# Patient Record
Sex: MALE | Race: WHITE | NOT HISPANIC OR LATINO | Employment: FULL TIME | URBAN - METROPOLITAN AREA
[De-identification: names, ages, dates, MRNs, and addresses within clinical notes are randomized per-mention and may not be internally consistent; named-entity substitution may affect disease eponyms.]

---

## 2017-05-27 ENCOUNTER — APPOINTMENT (EMERGENCY)
Dept: CT IMAGING | Facility: HOSPITAL | Age: 42
End: 2017-05-27
Payer: COMMERCIAL

## 2017-05-27 ENCOUNTER — HOSPITAL ENCOUNTER (EMERGENCY)
Facility: HOSPITAL | Age: 42
Discharge: HOME | End: 2017-05-27
Attending: EMERGENCY MEDICINE
Payer: COMMERCIAL

## 2017-05-27 VITALS
SYSTOLIC BLOOD PRESSURE: 115 MMHG | DIASTOLIC BLOOD PRESSURE: 89 MMHG | WEIGHT: 230 LBS | RESPIRATION RATE: 18 BRPM | BODY MASS INDEX: 32.93 KG/M2 | HEIGHT: 70 IN | HEART RATE: 79 BPM | OXYGEN SATURATION: 96 %

## 2017-05-27 DIAGNOSIS — N23 RENAL COLIC: Primary | ICD-10-CM

## 2017-05-27 LAB
ANION GAP SERPL CALCULATED.3IONS-SCNC: 7 MMOL/L (ref 4–13)
BACTERIA UR QL AUTO: ABNORMAL /HPF
BASOPHILS # BLD AUTO: 0.06 THOUSANDS/ΜL (ref 0–0.1)
BASOPHILS NFR BLD AUTO: 1 % (ref 0–1)
BILIRUB UR QL STRIP: ABNORMAL
BUN SERPL-MCNC: 10 MG/DL (ref 5–25)
CALCIUM SERPL-MCNC: 9.5 MG/DL (ref 8.3–10.1)
CHLORIDE SERPL-SCNC: 104 MMOL/L (ref 100–108)
CLARITY UR: ABNORMAL
CO2 SERPL-SCNC: 28 MMOL/L (ref 21–32)
COLOR UR: YELLOW
CREAT SERPL-MCNC: 0.9 MG/DL (ref 0.6–1.3)
EOSINOPHIL # BLD AUTO: 0.12 THOUSAND/ΜL (ref 0–0.61)
EOSINOPHIL NFR BLD AUTO: 1 % (ref 0–6)
ERYTHROCYTE [DISTWIDTH] IN BLOOD BY AUTOMATED COUNT: 12.9 % (ref 11.6–15.1)
GFR SERPL CREATININE-BSD FRML MDRD: >60 ML/MIN/1.73SQ M
GLUCOSE SERPL-MCNC: 127 MG/DL (ref 65–140)
GLUCOSE UR STRIP-MCNC: NEGATIVE MG/DL
HCT VFR BLD AUTO: 45.8 % (ref 36.5–49.3)
HGB BLD-MCNC: 15.1 G/DL (ref 12–17)
HGB UR QL STRIP.AUTO: ABNORMAL
KETONES UR STRIP-MCNC: ABNORMAL MG/DL
LEUKOCYTE ESTERASE UR QL STRIP: NEGATIVE
LYMPHOCYTES # BLD AUTO: 2.59 THOUSANDS/ΜL (ref 0.6–4.47)
LYMPHOCYTES NFR BLD AUTO: 26 % (ref 14–44)
MCH RBC QN AUTO: 28.5 PG (ref 26.8–34.3)
MCHC RBC AUTO-ENTMCNC: 33 G/DL (ref 31.4–37.4)
MCV RBC AUTO: 86 FL (ref 82–98)
MONOCYTES # BLD AUTO: 1.1 THOUSAND/ΜL (ref 0.17–1.22)
MONOCYTES NFR BLD AUTO: 11 % (ref 4–12)
NEUTROPHILS # BLD AUTO: 6 THOUSANDS/ΜL (ref 1.85–7.62)
NEUTS SEG NFR BLD AUTO: 61 % (ref 43–75)
NITRITE UR QL STRIP: NEGATIVE
NON-SQ EPI CELLS URNS QL MICRO: ABNORMAL /HPF
NRBC BLD AUTO-RTO: 0 /100 WBCS
OTHER STN SPEC: ABNORMAL
PH UR STRIP.AUTO: 5 [PH] (ref 4.5–8)
PLATELET # BLD AUTO: 280 THOUSANDS/UL (ref 149–390)
PMV BLD AUTO: 10.8 FL (ref 8.9–12.7)
POTASSIUM SERPL-SCNC: 4 MMOL/L (ref 3.5–5.3)
PROT UR STRIP-MCNC: ABNORMAL MG/DL
RBC # BLD AUTO: 5.3 MILLION/UL (ref 3.88–5.62)
RBC #/AREA URNS AUTO: ABNORMAL /HPF
SODIUM SERPL-SCNC: 139 MMOL/L (ref 136–145)
SP GR UR STRIP.AUTO: >=1.03 (ref 1–1.03)
UROBILINOGEN UR QL STRIP.AUTO: 1 E.U./DL
WBC # BLD AUTO: 9.89 THOUSAND/UL (ref 4.31–10.16)
WBC #/AREA URNS AUTO: ABNORMAL /HPF

## 2017-05-27 PROCEDURE — 96376 TX/PRO/DX INJ SAME DRUG ADON: CPT

## 2017-05-27 PROCEDURE — 74176 CT ABD & PELVIS W/O CONTRAST: CPT

## 2017-05-27 PROCEDURE — 96374 THER/PROPH/DIAG INJ IV PUSH: CPT

## 2017-05-27 PROCEDURE — 80048 BASIC METABOLIC PNL TOTAL CA: CPT | Performed by: EMERGENCY MEDICINE

## 2017-05-27 PROCEDURE — 81001 URINALYSIS AUTO W/SCOPE: CPT | Performed by: EMERGENCY MEDICINE

## 2017-05-27 PROCEDURE — 99284 EMERGENCY DEPT VISIT MOD MDM: CPT

## 2017-05-27 PROCEDURE — 96375 TX/PRO/DX INJ NEW DRUG ADDON: CPT

## 2017-05-27 PROCEDURE — 36415 COLL VENOUS BLD VENIPUNCTURE: CPT | Performed by: EMERGENCY MEDICINE

## 2017-05-27 PROCEDURE — 85025 COMPLETE CBC W/AUTO DIFF WBC: CPT | Performed by: EMERGENCY MEDICINE

## 2017-05-27 PROCEDURE — 96361 HYDRATE IV INFUSION ADD-ON: CPT

## 2017-05-27 RX ORDER — DEXTROAMPHETAMINE SACCHARATE, AMPHETAMINE ASPARTATE, DEXTROAMPHETAMINE SULFATE AND AMPHETAMINE SULFATE 2.5; 2.5; 2.5; 2.5 MG/1; MG/1; MG/1; MG/1
1 TABLET ORAL
COMMUNITY
End: 2018-08-25

## 2017-05-27 RX ORDER — OXYCODONE HYDROCHLORIDE AND ACETAMINOPHEN 5; 325 MG/1; MG/1
1 TABLET ORAL EVERY 6 HOURS PRN
Qty: 20 TABLET | Refills: 0 | Status: SHIPPED | OUTPATIENT
Start: 2017-05-27 | End: 2017-06-06

## 2017-05-27 RX ORDER — MORPHINE SULFATE 4 MG/ML
4 INJECTION, SOLUTION INTRAMUSCULAR; INTRAVENOUS ONCE
Status: COMPLETED | OUTPATIENT
Start: 2017-05-27 | End: 2017-05-27

## 2017-05-27 RX ORDER — MORPHINE SULFATE 4 MG/ML
INJECTION, SOLUTION INTRAMUSCULAR; INTRAVENOUS
Status: COMPLETED
Start: 2017-05-27 | End: 2017-05-27

## 2017-05-27 RX ORDER — ONDANSETRON 2 MG/ML
INJECTION INTRAMUSCULAR; INTRAVENOUS
Status: COMPLETED
Start: 2017-05-27 | End: 2017-05-27

## 2017-05-27 RX ORDER — ONDANSETRON 4 MG/1
4 TABLET, FILM COATED ORAL EVERY 6 HOURS PRN
Qty: 10 TABLET | Refills: 0 | Status: SHIPPED | OUTPATIENT
Start: 2017-05-27 | End: 2018-08-25

## 2017-05-27 RX ORDER — TAMSULOSIN HYDROCHLORIDE 0.4 MG/1
0.4 CAPSULE ORAL
Qty: 14 CAPSULE | Refills: 0 | Status: SHIPPED | OUTPATIENT
Start: 2017-05-27 | End: 2018-08-25

## 2017-05-27 RX ORDER — ONDANSETRON 2 MG/ML
4 INJECTION INTRAMUSCULAR; INTRAVENOUS ONCE
Status: COMPLETED | OUTPATIENT
Start: 2017-05-27 | End: 2017-05-27

## 2017-05-27 RX ADMIN — MORPHINE SULFATE 4 MG: 4 INJECTION, SOLUTION INTRAMUSCULAR; INTRAVENOUS at 19:42

## 2017-05-27 RX ADMIN — ONDANSETRON 4 MG: 2 INJECTION INTRAMUSCULAR; INTRAVENOUS at 19:42

## 2017-05-27 RX ADMIN — MORPHINE SULFATE 4 MG: 4 INJECTION, SOLUTION INTRAMUSCULAR; INTRAVENOUS at 20:28

## 2017-05-27 RX ADMIN — SODIUM CHLORIDE 1000 ML: 0.9 INJECTION, SOLUTION INTRAVENOUS at 19:42

## 2017-05-30 ENCOUNTER — GENERIC CONVERSION - ENCOUNTER (OUTPATIENT)
Dept: OTHER | Facility: OTHER | Age: 42
End: 2017-05-30

## 2017-05-30 ENCOUNTER — ALLSCRIPTS OFFICE VISIT (OUTPATIENT)
Dept: OTHER | Facility: OTHER | Age: 42
End: 2017-05-30

## 2017-05-30 LAB
BILIRUB UR QL STRIP: NORMAL
CLARITY UR: NORMAL
COLOR UR: YELLOW
GLUCOSE (HISTORICAL): NORMAL
HGB UR QL STRIP.AUTO: NORMAL
KETONES UR STRIP-MCNC: NORMAL MG/DL
LEUKOCYTE ESTERASE UR QL STRIP: NORMAL
NITRITE UR QL STRIP: NORMAL
PH UR STRIP.AUTO: 7 [PH]
PROT UR STRIP-MCNC: NORMAL MG/DL
SP GR UR STRIP.AUTO: 1

## 2017-06-02 ENCOUNTER — APPOINTMENT (OUTPATIENT)
Dept: LAB | Facility: CLINIC | Age: 42
End: 2017-06-02
Payer: COMMERCIAL

## 2017-06-02 ENCOUNTER — OFFICE VISIT (OUTPATIENT)
Dept: LAB | Facility: CLINIC | Age: 42
End: 2017-06-02
Payer: COMMERCIAL

## 2017-06-02 ENCOUNTER — TRANSCRIBE ORDERS (OUTPATIENT)
Dept: LAB | Facility: CLINIC | Age: 42
End: 2017-06-02

## 2017-06-02 DIAGNOSIS — N20.0 CALCULUS OF KIDNEY: Primary | ICD-10-CM

## 2017-06-02 DIAGNOSIS — N20.0 CALCULUS OF KIDNEY: ICD-10-CM

## 2017-06-02 LAB
ANION GAP SERPL CALCULATED.3IONS-SCNC: 8 MMOL/L (ref 4–13)
APTT PPP: 27 SECONDS (ref 23–35)
ATRIAL RATE: 63 BPM
BASOPHILS # BLD AUTO: 0.08 THOUSANDS/ΜL (ref 0–0.1)
BASOPHILS NFR BLD AUTO: 1 % (ref 0–1)
BUN SERPL-MCNC: 16 MG/DL (ref 5–25)
CALCIUM SERPL-MCNC: 8.9 MG/DL (ref 8.3–10.1)
CHLORIDE SERPL-SCNC: 107 MMOL/L (ref 100–108)
CO2 SERPL-SCNC: 28 MMOL/L (ref 21–32)
CREAT SERPL-MCNC: 1.48 MG/DL (ref 0.6–1.3)
EOSINOPHIL # BLD AUTO: 0.22 THOUSAND/ΜL (ref 0–0.61)
EOSINOPHIL NFR BLD AUTO: 2 % (ref 0–6)
ERYTHROCYTE [DISTWIDTH] IN BLOOD BY AUTOMATED COUNT: 12.9 % (ref 11.6–15.1)
GFR SERPL CREATININE-BSD FRML MDRD: 52.1 ML/MIN/1.73SQ M
GLUCOSE SERPL-MCNC: 93 MG/DL (ref 65–140)
HCT VFR BLD AUTO: 43.2 % (ref 36.5–49.3)
HGB BLD-MCNC: 14.2 G/DL (ref 12–17)
INR PPP: 0.92 (ref 0.86–1.16)
LYMPHOCYTES # BLD AUTO: 2.62 THOUSANDS/ΜL (ref 0.6–4.47)
LYMPHOCYTES NFR BLD AUTO: 27 % (ref 14–44)
MCH RBC QN AUTO: 28.4 PG (ref 26.8–34.3)
MCHC RBC AUTO-ENTMCNC: 32.9 G/DL (ref 31.4–37.4)
MCV RBC AUTO: 86 FL (ref 82–98)
MONOCYTES # BLD AUTO: 1.09 THOUSAND/ΜL (ref 0.17–1.22)
MONOCYTES NFR BLD AUTO: 11 % (ref 4–12)
NEUTROPHILS # BLD AUTO: 5.67 THOUSANDS/ΜL (ref 1.85–7.62)
NEUTS SEG NFR BLD AUTO: 59 % (ref 43–75)
P AXIS: 6 DEGREES
PLATELET # BLD AUTO: 295 THOUSANDS/UL (ref 149–390)
PMV BLD AUTO: 10.4 FL (ref 8.9–12.7)
POTASSIUM SERPL-SCNC: 4.1 MMOL/L (ref 3.5–5.3)
PR INTERVAL: 156 MS
PROTHROMBIN TIME: 12.6 SECONDS (ref 12.1–14.4)
QRS AXIS: 60 DEGREES
QRSD INTERVAL: 88 MS
QT INTERVAL: 374 MS
QTC INTERVAL: 382 MS
RBC # BLD AUTO: 5 MILLION/UL (ref 3.88–5.62)
SODIUM SERPL-SCNC: 143 MMOL/L (ref 136–145)
T WAVE AXIS: 20 DEGREES
VENTRICULAR RATE: 63 BPM
WBC # BLD AUTO: 9.68 THOUSAND/UL (ref 4.31–10.16)

## 2017-06-02 PROCEDURE — 85610 PROTHROMBIN TIME: CPT

## 2017-06-02 PROCEDURE — 85730 THROMBOPLASTIN TIME PARTIAL: CPT

## 2017-06-02 PROCEDURE — 85025 COMPLETE CBC W/AUTO DIFF WBC: CPT

## 2017-06-02 PROCEDURE — 36415 COLL VENOUS BLD VENIPUNCTURE: CPT

## 2017-06-02 PROCEDURE — 80048 BASIC METABOLIC PNL TOTAL CA: CPT

## 2017-06-02 PROCEDURE — 93005 ELECTROCARDIOGRAM TRACING: CPT

## 2017-06-07 ENCOUNTER — ANESTHESIA EVENT (OUTPATIENT)
Dept: PERIOP | Facility: HOSPITAL | Age: 42
End: 2017-06-07
Payer: COMMERCIAL

## 2017-06-08 ENCOUNTER — ANESTHESIA (OUTPATIENT)
Dept: PERIOP | Facility: HOSPITAL | Age: 42
End: 2017-06-08
Payer: COMMERCIAL

## 2017-06-08 ENCOUNTER — APPOINTMENT (OUTPATIENT)
Dept: RADIOLOGY | Facility: HOSPITAL | Age: 42
End: 2017-06-08
Payer: COMMERCIAL

## 2017-06-08 ENCOUNTER — HOSPITAL ENCOUNTER (OUTPATIENT)
Facility: HOSPITAL | Age: 42
Setting detail: OUTPATIENT SURGERY
Discharge: HOME | End: 2017-06-08
Attending: UROLOGY | Admitting: UROLOGY
Payer: COMMERCIAL

## 2017-06-08 VITALS
DIASTOLIC BLOOD PRESSURE: 95 MMHG | WEIGHT: 230 LBS | HEART RATE: 60 BPM | TEMPERATURE: 97.5 F | HEIGHT: 70 IN | BODY MASS INDEX: 32.93 KG/M2 | RESPIRATION RATE: 18 BRPM | OXYGEN SATURATION: 95 % | SYSTOLIC BLOOD PRESSURE: 150 MMHG

## 2017-06-08 DIAGNOSIS — N20.0 NEPHROLITHIASIS: ICD-10-CM

## 2017-06-08 PROCEDURE — C1769 GUIDE WIRE: HCPCS | Performed by: UROLOGY

## 2017-06-08 PROCEDURE — C1758 CATHETER, URETERAL: HCPCS | Performed by: UROLOGY

## 2017-06-08 PROCEDURE — C2617 STENT, NON-COR, TEM W/O DEL: HCPCS | Performed by: UROLOGY

## 2017-06-08 PROCEDURE — 82360 CALCULUS ASSAY QUANT: CPT | Performed by: UROLOGY

## 2017-06-08 PROCEDURE — 74000 HB X-RAY EXAM OF ABDOMEN (SINGLE ANTEROPOSTERIOR VIEW): CPT

## 2017-06-08 PROCEDURE — A9270 NON-COVERED ITEM OR SERVICE: HCPCS | Performed by: UROLOGY

## 2017-06-08 DEVICE — STENT URETERAL 6 FR 26CM INLAY OPTIMA: Type: IMPLANTABLE DEVICE | Site: URETER | Status: FUNCTIONAL

## 2017-06-08 RX ORDER — ONDANSETRON 2 MG/ML
INJECTION INTRAMUSCULAR; INTRAVENOUS AS NEEDED
Status: DISCONTINUED | OUTPATIENT
Start: 2017-06-08 | End: 2017-06-08 | Stop reason: SURG

## 2017-06-08 RX ORDER — MORPHINE SULFATE 4 MG/ML
4 INJECTION, SOLUTION INTRAMUSCULAR; INTRAVENOUS EVERY 4 HOURS PRN
Status: DISCONTINUED | OUTPATIENT
Start: 2017-06-08 | End: 2017-06-08 | Stop reason: HOSPADM

## 2017-06-08 RX ORDER — CEFAZOLIN SODIUM 1 G/3ML
INJECTION, POWDER, FOR SOLUTION INTRAMUSCULAR; INTRAVENOUS AS NEEDED
Status: DISCONTINUED | OUTPATIENT
Start: 2017-06-08 | End: 2017-06-08 | Stop reason: SURG

## 2017-06-08 RX ORDER — PROPOFOL 10 MG/ML
INJECTION, EMULSION INTRAVENOUS AS NEEDED
Status: DISCONTINUED | OUTPATIENT
Start: 2017-06-08 | End: 2017-06-08 | Stop reason: SURG

## 2017-06-08 RX ORDER — HYDROCODONE BITARTRATE AND ACETAMINOPHEN 5; 325 MG/1; MG/1
1 TABLET ORAL EVERY 6 HOURS PRN
Qty: 5 TABLET | Refills: 0 | Status: SHIPPED | OUTPATIENT
Start: 2017-06-08 | End: 2017-06-10

## 2017-06-08 RX ORDER — ONDANSETRON 2 MG/ML
4 INJECTION INTRAMUSCULAR; INTRAVENOUS ONCE
Status: DISCONTINUED | OUTPATIENT
Start: 2017-06-08 | End: 2017-06-08 | Stop reason: HOSPADM

## 2017-06-08 RX ORDER — HYDROCODONE BITARTRATE AND ACETAMINOPHEN 5; 325 MG/1; MG/1
1 TABLET ORAL EVERY 4 HOURS PRN
Status: DISCONTINUED | OUTPATIENT
Start: 2017-06-08 | End: 2017-06-08 | Stop reason: HOSPADM

## 2017-06-08 RX ORDER — SODIUM CHLORIDE, SODIUM LACTATE, POTASSIUM CHLORIDE, CALCIUM CHLORIDE 600; 310; 30; 20 MG/100ML; MG/100ML; MG/100ML; MG/100ML
100 INJECTION, SOLUTION INTRAVENOUS CONTINUOUS
Status: DISCONTINUED | OUTPATIENT
Start: 2017-06-08 | End: 2017-06-08 | Stop reason: HOSPADM

## 2017-06-08 RX ORDER — CIPROFLOXACIN 500 MG/1
500 TABLET, FILM COATED ORAL 2 TIMES DAILY
Qty: 6 TABLET | Refills: 0 | Status: SHIPPED | OUTPATIENT
Start: 2017-06-08 | End: 2017-06-11

## 2017-06-08 RX ORDER — SODIUM CHLORIDE, SODIUM LACTATE, POTASSIUM CHLORIDE, CALCIUM CHLORIDE 600; 310; 30; 20 MG/100ML; MG/100ML; MG/100ML; MG/100ML
INJECTION, SOLUTION INTRAVENOUS CONTINUOUS PRN
Status: DISCONTINUED | OUTPATIENT
Start: 2017-06-08 | End: 2017-06-08 | Stop reason: SURG

## 2017-06-08 RX ORDER — ONDANSETRON 2 MG/ML
4 INJECTION INTRAMUSCULAR; INTRAVENOUS EVERY 4 HOURS PRN
Status: DISCONTINUED | OUTPATIENT
Start: 2017-06-08 | End: 2017-06-08 | Stop reason: HOSPADM

## 2017-06-08 RX ORDER — ACETAMINOPHEN 325 MG/1
650 TABLET ORAL EVERY 4 HOURS PRN
Status: DISCONTINUED | OUTPATIENT
Start: 2017-06-08 | End: 2017-06-08 | Stop reason: HOSPADM

## 2017-06-08 RX ORDER — FENTANYL CITRATE/PF 50 MCG/ML
25 SYRINGE (ML) INJECTION
Status: DISCONTINUED | OUTPATIENT
Start: 2017-06-08 | End: 2017-06-08 | Stop reason: HOSPADM

## 2017-06-08 RX ORDER — MIDAZOLAM HYDROCHLORIDE 1 MG/ML
INJECTION INTRAMUSCULAR; INTRAVENOUS AS NEEDED
Status: DISCONTINUED | OUTPATIENT
Start: 2017-06-08 | End: 2017-06-08 | Stop reason: SURG

## 2017-06-08 RX ORDER — FENTANYL CITRATE 50 UG/ML
INJECTION, SOLUTION INTRAMUSCULAR; INTRAVENOUS AS NEEDED
Status: DISCONTINUED | OUTPATIENT
Start: 2017-06-08 | End: 2017-06-08 | Stop reason: SURG

## 2017-06-08 RX ADMIN — PROPOFOL 200 MG: 10 INJECTION, EMULSION INTRAVENOUS at 13:17

## 2017-06-08 RX ADMIN — MIDAZOLAM HYDROCHLORIDE 2 MG: 1 INJECTION, SOLUTION INTRAMUSCULAR; INTRAVENOUS at 13:17

## 2017-06-08 RX ADMIN — SODIUM CHLORIDE, SODIUM LACTATE, POTASSIUM CHLORIDE, AND CALCIUM CHLORIDE: .6; .31; .03; .02 INJECTION, SOLUTION INTRAVENOUS at 13:15

## 2017-06-08 RX ADMIN — CEFAZOLIN SODIUM 2000 MG: 1 INJECTION, POWDER, FOR SOLUTION INTRAMUSCULAR; INTRAVENOUS at 13:11

## 2017-06-08 RX ADMIN — FENTANYL CITRATE 25 MCG: 50 INJECTION, SOLUTION INTRAMUSCULAR; INTRAVENOUS at 14:25

## 2017-06-08 RX ADMIN — DEXAMETHASONE SODIUM PHOSPHATE 10 MG: 10 INJECTION INTRAMUSCULAR; INTRAVENOUS at 13:17

## 2017-06-08 RX ADMIN — FENTANYL CITRATE 100 MCG: 50 INJECTION INTRAMUSCULAR; INTRAVENOUS at 13:17

## 2017-06-08 RX ADMIN — ONDANSETRON 4 MG: 2 INJECTION INTRAMUSCULAR; INTRAVENOUS at 13:17

## 2017-06-08 RX ADMIN — HYDROCODONE BITARTRATE AND ACETAMINOPHEN 1 TABLET: 5; 325 TABLET ORAL at 15:03

## 2017-06-08 RX ADMIN — FENTANYL CITRATE 25 MCG: 50 INJECTION, SOLUTION INTRAMUSCULAR; INTRAVENOUS at 14:29

## 2017-06-12 ENCOUNTER — GENERIC CONVERSION - ENCOUNTER (OUTPATIENT)
Dept: OTHER | Facility: OTHER | Age: 42
End: 2017-06-12

## 2017-06-19 LAB
CA PHOS MFR STONE: 5 %
CALCIUM OXALATE DIHYDRATE MFR STONE IR: 20 %
COLOR STONE: NORMAL
COM MFR STONE: 75 %
COMMENT-STONE3: NORMAL
COMPOSITION: NORMAL
LABORATORY COMMENT REPORT: NORMAL
NIDUS STONE QL: NORMAL
PHOTO: NORMAL
SIZE STONE: NORMAL MM
STONE ANALYSIS-IMP: NORMAL
STONE ANALYSIS-IMP: NORMAL
WT STONE: 22 MG

## 2017-06-20 DIAGNOSIS — N20.0 CALCULUS OF KIDNEY: ICD-10-CM

## 2017-07-19 ENCOUNTER — ALLSCRIPTS OFFICE VISIT (OUTPATIENT)
Dept: OTHER | Facility: OTHER | Age: 42
End: 2017-07-19

## 2018-01-12 NOTE — MISCELLANEOUS
Message  Return to work or school:   Lolita Plascencia is under my professional care  He was seen in my office on 10/4/16   He is able to return to work on  10/5/16    He can perform work without limitations  Kiel Hooper PA-C        Signatures   Electronically signed by : Kiel Hooper, [facility]; Oct  4 2016 12:01PM EST                       (Author)    Electronically signed by : Kiel Hooper, [facility]; Oct  4 2016 12:32PM EST                       (Author)    Electronically signed by : AGA Vanegas ; Oct  4 2016  4:31PM EST                       (Author)

## 2018-01-12 NOTE — MISCELLANEOUS
Message  Return to work or school:    He is able to return to work on  06/15/2017       AGA Landis        Signatures   Electronically signed by : AGA Bell ; Jun 12 2017  1:44PM EST

## 2018-01-14 VITALS
HEART RATE: 80 BPM | HEIGHT: 70 IN | SYSTOLIC BLOOD PRESSURE: 142 MMHG | BODY MASS INDEX: 33.21 KG/M2 | WEIGHT: 232 LBS | DIASTOLIC BLOOD PRESSURE: 90 MMHG

## 2018-01-15 VITALS
BODY MASS INDEX: 32.99 KG/M2 | HEART RATE: 80 BPM | WEIGHT: 230.44 LBS | SYSTOLIC BLOOD PRESSURE: 160 MMHG | DIASTOLIC BLOOD PRESSURE: 100 MMHG | HEIGHT: 70 IN
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
## 2018-01-23 NOTE — PROGRESS NOTES
Assessment   1  Closed fracture of distal phalanx of left thumb (816 02) (V02 107P)    Plan  Closed fracture of distal phalanx of left thumb    · Follow-up PRN Evaluation and Treatment  Follow-up  Status: Complete  Done:  [id number]   · * XR THUMB LEFT FIRST DIGIT-MIN 2V; Status:Active;1  Requested for:[id number];      1 Amended By: Katja Avalos; Oct 04 2016 4:31 PM EST    Discussion/Summary    The patient was seen and examined  I discussed with the patient the results of his x-rays, and anticipate his nerve to continue to recover from the crush injury  We'll send him back to work full duty without restrictions  We'll see him back on an as-needed basis, the patient's questions were answered  Case discussed with Dr Angelique Dyer  Chief Complaint   1  Finger Problem  Follow-up left thumb distal phalanx fracture      History of Present Illness  HPI: Rochelle Nicholas is a 50-year-old male presenting to the office today for follow-up of his left thumb distal phalanx fracture  He reports doing well  He is doing well with light duty at work  He states that he does feel some numbness and tingling to the distal tip of the thumb which is getting better with desensitization techniques  He did attend physical therapy  His thumb nail is growing back  Review of Systems  ROS: All pertinent positives and negatives are reviewed with the patient, please see HPI for pertinent positives  All other systems reviewed and are negative  No shortness of breath, no chest pain  ROS reviewed  Active Problems   1  Closed fracture of distal phalanx of left thumb (383 12) (X60 876R)    Past Medical History    The active problems and past medical history were reviewed and updated today  Surgical History    · History of Knee Surgery    The surgical history was reviewed and updated today  Family History  Family History    · No pertinent family history    The family history was reviewed and updated today         Social History    · Alcohol use (V49 89) (Z78 9)   · Caffeine use (V49 89) (F15 90)   · Never a smoker  The social history was reviewed and updated today  The social history was reviewed and is unchanged  Current Meds  1  Adderall 10 MG Oral Tablet Recorded    The medication list was reviewed and updated today  Allergies   1  No Known Drug Allergies    Vitals   Recorded: [id number] 56:55NE   Systolic 457   Diastolic 96   Heart Rate 73   Height 5 ft 10 in   Weight 227 lb    BMI Calculated 32 57   BSA Calculated 2 21     Physical Exam  Physical exam left thumb: Skin is intact, no obvious deformity  The patient reports decreased sensation to the tip of the thumb  The nail is growing underneath the dead nail  5 out of 5 APB strength  Full composite fist formation, sensation and motor intact median, radial, ulnar nerve distributions  Constitutional - General appearance: Normal    Musculoskeletal - Gait and station: Normal    Psychiatric - Orientation to person, place, and time: Normal  Mood and affect: Normal    Eyes   Conjunctiva and lids: Normal        Results/Data  I personally reviewed the films/images/results in the office today  My interpretation follows  X-ray Review 3 views of the left thumb reveal distal phalanx fracture in good position and evidence of healing  Attending Note  Collaborating Physician Note: Collaborating Physician:1  I interviewed and examined the patient1  and I agree with the Advanced Practitioner note1         1 Amended By: Elena Acosta; Oct 04 2016 4:31 PM EST    Message  Return to work or school:   Waldemar Fonseca is under my professional care  He was seen in my office on 10/4/16   He is able to return to work on  10/5/16    He can perform work without limitations  Pooja Barth PA-C        Signatures   Electronically signed by : Pooja Barth, [facility]; Oct  4 2016 12:01PM EST                       (Author)    Electronically signed by : AGA Pelaez ; Oct  4 2016  4:31PM EST (Author)

## 2018-03-10 ENCOUNTER — HOSPITAL ENCOUNTER (EMERGENCY)
Facility: HOSPITAL | Age: 43
Discharge: HOME | End: 2018-03-10
Attending: EMERGENCY MEDICINE
Payer: COMMERCIAL

## 2018-03-10 VITALS
TEMPERATURE: 98.7 F | HEART RATE: 77 BPM | SYSTOLIC BLOOD PRESSURE: 160 MMHG | RESPIRATION RATE: 16 BRPM | DIASTOLIC BLOOD PRESSURE: 83 MMHG | OXYGEN SATURATION: 96 % | WEIGHT: 235 LBS | BODY MASS INDEX: 33.72 KG/M2

## 2018-03-10 DIAGNOSIS — S39.012A ACUTE MYOFASCIAL STRAIN OF LUMBAR REGION, INITIAL ENCOUNTER: Primary | ICD-10-CM
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
## 2018-03-10 PROCEDURE — 99283 EMERGENCY DEPT VISIT LOW MDM: CPT

## 2018-03-10 RX ORDER — METHOCARBAMOL 500 MG/1
500 TABLET, FILM COATED ORAL 2 TIMES DAILY
Qty: 20 TABLET | Refills: 0 | Status: SHIPPED | OUTPATIENT
Start: 2018-03-10 | End: 2018-08-25
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
## 2018-03-30 ENCOUNTER — TELEPHONE (OUTPATIENT)
Dept: UROLOGY | Facility: AMBULATORY SURGERY CENTER | Age: 43
End: 2018-03-30

## 2018-03-30 ENCOUNTER — APPOINTMENT (EMERGENCY)
Dept: CT IMAGING | Facility: HOSPITAL | Age: 43
End: 2018-03-30
Payer: COMMERCIAL

## 2018-03-30 ENCOUNTER — HOSPITAL ENCOUNTER (EMERGENCY)
Facility: HOSPITAL | Age: 43
Discharge: HOME | End: 2018-03-30
Payer: COMMERCIAL

## 2018-03-30 VITALS
BODY MASS INDEX: 35.08 KG/M2 | WEIGHT: 244.49 LBS | TEMPERATURE: 98.2 F | HEART RATE: 80 BPM | OXYGEN SATURATION: 94 % | RESPIRATION RATE: 16 BRPM | DIASTOLIC BLOOD PRESSURE: 86 MMHG | SYSTOLIC BLOOD PRESSURE: 137 MMHG

## 2018-03-30 DIAGNOSIS — N20.0 KIDNEY STONE: Primary | ICD-10-CM

## 2018-03-30 DIAGNOSIS — R11.2 NAUSEA & VOMITING: ICD-10-CM

## 2018-03-30 DIAGNOSIS — K76.0 HEPATIC STEATOSIS: ICD-10-CM

## 2018-03-30 LAB
ALBUMIN SERPL BCP-MCNC: 3.6 G/DL (ref 3.5–5)
ALP SERPL-CCNC: 57 U/L (ref 46–116)
ALT SERPL W P-5'-P-CCNC: 121 U/L (ref 12–78)
ANION GAP SERPL CALCULATED.3IONS-SCNC: 9 MMOL/L (ref 4–13)
AST SERPL W P-5'-P-CCNC: 48 U/L (ref 5–45)
BASOPHILS # BLD AUTO: 0.08 THOUSANDS/ΜL (ref 0–0.1)
BASOPHILS NFR BLD AUTO: 1 % (ref 0–1)
BILIRUB SERPL-MCNC: 0.8 MG/DL (ref 0.2–1)
BUN SERPL-MCNC: 15 MG/DL (ref 5–25)
CALCIUM SERPL-MCNC: 8.8 MG/DL (ref 8.3–10.1)
CHLORIDE SERPL-SCNC: 103 MMOL/L (ref 100–108)
CLARITY, POC: CLEAR
CO2 SERPL-SCNC: 27 MMOL/L (ref 21–32)
COLOR, POC: ABNORMAL
CREAT SERPL-MCNC: 0.87 MG/DL (ref 0.6–1.3)
EOSINOPHIL # BLD AUTO: 0.18 THOUSAND/ΜL (ref 0–0.61)
EOSINOPHIL NFR BLD AUTO: 2 % (ref 0–6)
ERYTHROCYTE [DISTWIDTH] IN BLOOD BY AUTOMATED COUNT: 13 % (ref 11.6–15.1)
EXT BILIRUBIN, UA: NEGATIVE
EXT BLOOD URINE: ABNORMAL
EXT GLUCOSE, UA: NEGATIVE
EXT KETONES: NEGATIVE
EXT NITRITE, UA: NEGATIVE
EXT PH, UA: 6
EXT PROTEIN, UA: ABNORMAL
EXT SPECIFIC GRAVITY, UA: 1.03
EXT UROBILINOGEN: NEGATIVE
GFR SERPL CREATININE-BSD FRML MDRD: 106 ML/MIN/1.73SQ M
GLUCOSE SERPL-MCNC: 149 MG/DL (ref 65–140)
HCT VFR BLD AUTO: 47.3 % (ref 36.5–49.3)
HGB BLD-MCNC: 15.7 G/DL (ref 12–17)
LIPASE SERPL-CCNC: 102 U/L (ref 73–393)
LYMPHOCYTES # BLD AUTO: 2.86 THOUSANDS/ΜL (ref 0.6–4.47)
LYMPHOCYTES NFR BLD AUTO: 30 % (ref 14–44)
MCH RBC QN AUTO: 28.5 PG (ref 26.8–34.3)
MCHC RBC AUTO-ENTMCNC: 33.2 G/DL (ref 31.4–37.4)
MCV RBC AUTO: 86 FL (ref 82–98)
MONOCYTES # BLD AUTO: 1.13 THOUSAND/ΜL (ref 0.17–1.22)
MONOCYTES NFR BLD AUTO: 12 % (ref 4–12)
NEUTROPHILS # BLD AUTO: 5.42 THOUSANDS/ΜL (ref 1.85–7.62)
NEUTS SEG NFR BLD AUTO: 56 % (ref 43–75)
NRBC BLD AUTO-RTO: 0 /100 WBCS
PLATELET # BLD AUTO: 251 THOUSANDS/UL (ref 149–390)
PMV BLD AUTO: 10.3 FL (ref 8.9–12.7)
POTASSIUM SERPL-SCNC: 4.1 MMOL/L (ref 3.5–5.3)
PROT SERPL-MCNC: 7.1 G/DL (ref 6.4–8.2)
RBC # BLD AUTO: 5.51 MILLION/UL (ref 3.88–5.62)
SODIUM SERPL-SCNC: 139 MMOL/L (ref 136–145)
WBC # BLD AUTO: 9.7 THOUSAND/UL (ref 4.31–10.16)
WBC # BLD EST: NEGATIVE 10*3/UL

## 2018-03-30 PROCEDURE — 74176 CT ABD & PELVIS W/O CONTRAST: CPT

## 2018-03-30 PROCEDURE — 96361 HYDRATE IV INFUSION ADD-ON: CPT

## 2018-03-30 PROCEDURE — 85025 COMPLETE CBC W/AUTO DIFF WBC: CPT | Performed by: PHYSICIAN ASSISTANT

## 2018-03-30 PROCEDURE — 96375 TX/PRO/DX INJ NEW DRUG ADDON: CPT

## 2018-03-30 PROCEDURE — 96374 THER/PROPH/DIAG INJ IV PUSH: CPT

## 2018-03-30 PROCEDURE — 83690 ASSAY OF LIPASE: CPT | Performed by: PHYSICIAN ASSISTANT

## 2018-03-30 PROCEDURE — 99284 EMERGENCY DEPT VISIT MOD MDM: CPT

## 2018-03-30 PROCEDURE — 36415 COLL VENOUS BLD VENIPUNCTURE: CPT | Performed by: PHYSICIAN ASSISTANT

## 2018-03-30 PROCEDURE — 80053 COMPREHEN METABOLIC PANEL: CPT | Performed by: PHYSICIAN ASSISTANT

## 2018-03-30 PROCEDURE — 81002 URINALYSIS NONAUTO W/O SCOPE: CPT | Performed by: PHYSICIAN ASSISTANT

## 2018-03-30 RX ORDER — ONDANSETRON 4 MG/1
TABLET, FILM COATED ORAL
Qty: 12 TABLET | Refills: 0 | Status: SHIPPED | OUTPATIENT
Start: 2018-03-30 | End: 2018-08-25

## 2018-03-30 RX ORDER — OXYCODONE HYDROCHLORIDE AND ACETAMINOPHEN 5; 325 MG/1; MG/1
TABLET ORAL
Qty: 20 TABLET | Refills: 0 | Status: SHIPPED | OUTPATIENT
Start: 2018-03-30 | End: 2018-08-25

## 2018-03-30 RX ORDER — IBUPROFEN 600 MG/1
TABLET ORAL
Qty: 30 TABLET | Refills: 0 | Status: SHIPPED | OUTPATIENT
Start: 2018-03-30 | End: 2018-08-25

## 2018-03-30 RX ORDER — FENTANYL CITRATE 50 UG/ML
50 INJECTION, SOLUTION INTRAMUSCULAR; INTRAVENOUS ONCE
Status: COMPLETED | OUTPATIENT
Start: 2018-03-30 | End: 2018-03-30

## 2018-03-30 RX ORDER — MORPHINE SULFATE 4 MG/ML
4 INJECTION, SOLUTION INTRAMUSCULAR; INTRAVENOUS ONCE
Status: COMPLETED | OUTPATIENT
Start: 2018-03-30 | End: 2018-03-30

## 2018-03-30 RX ORDER — KETOROLAC TROMETHAMINE 30 MG/ML
30 INJECTION, SOLUTION INTRAMUSCULAR; INTRAVENOUS ONCE
Status: COMPLETED | OUTPATIENT
Start: 2018-03-30 | End: 2018-03-30

## 2018-03-30 RX ORDER — ONDANSETRON 2 MG/ML
4 INJECTION INTRAMUSCULAR; INTRAVENOUS ONCE
Status: COMPLETED | OUTPATIENT
Start: 2018-03-30 | End: 2018-03-30

## 2018-03-30 RX ADMIN — SODIUM CHLORIDE 1000 ML: 0.9 INJECTION, SOLUTION INTRAVENOUS at 08:06

## 2018-03-30 RX ADMIN — MORPHINE SULFATE 4 MG: 4 INJECTION, SOLUTION INTRAMUSCULAR; INTRAVENOUS at 08:20

## 2018-03-30 RX ADMIN — ONDANSETRON 4 MG: 2 INJECTION INTRAMUSCULAR; INTRAVENOUS at 08:06

## 2018-03-30 RX ADMIN — FENTANYL CITRATE 50 MCG: 50 INJECTION, SOLUTION INTRAMUSCULAR; INTRAVENOUS at 09:19

## 2018-03-30 RX ADMIN — KETOROLAC TROMETHAMINE 30 MG: 30 INJECTION, SOLUTION INTRAMUSCULAR at 08:20

## 2018-03-30 NOTE — DISCHARGE INSTRUCTIONS
Kidney Stones   WHAT YOU NEED TO KNOW:   Kidney stones form in the urinary system when the water and waste in your urine are out of balance  When this happens, certain types of waste crystals separate from the urine  The crystals build up and form kidney stones  You may have 1 or more kidney stones  DISCHARGE INSTRUCTIONS:   Return to the emergency department if:   · You have vomiting that is not relieved by medicine  Contact your healthcare provider if:   · You have a fever  · You have trouble passing urine  · You see blood in your urine  · You have severe pain  · You have any questions or concerns about your condition or care  Medicines:   · NSAIDs , such as ibuprofen, help decrease swelling, pain, and fever  This medicine is available with or without a doctor's order  NSAIDs can cause stomach bleeding or kidney problems in certain people  If you take blood thinner medicine, always ask your healthcare provider if NSAIDs are safe for you  Always read the medicine label and follow directions  · Prescription medicine  may be given  Ask how to take this medicine safely  · Medicines  to balance your electrolytes may be needed  · Take your medicine as directed  Contact your healthcare provider if you think your medicine is not helping or if you have side effects  Tell him or her if you are allergic to any medicine  Keep a list of the medicines, vitamins, and herbs you take  Include the amounts, and when and why you take them  Bring the list or the pill bottles to follow-up visits  Carry your medicine list with you in case of an emergency  Follow up with your healthcare provider as directed: You may need to return for more tests  Write down your questions so you remember to ask them during your visits  Self-care:   · Drink plenty of liquids  Your healthcare provider may tell you to drink at least 8 to 12 (eight-ounce) cups of liquids each day   This helps flush out the kidney stones when you urinate  Water is the best liquid to drink  · Strain your urine every time you go to the bathroom  Urinate through a strainer or a piece of thin cloth to catch the stones  Take the stones to your healthcare provider so they can be sent to the lab for tests  This will help your healthcare providers plan the best treatment for you  · Eat a variety of healthy foods  Healthy foods include fruits, vegetables, whole-grain breads, low-fat dairy products, beans, and fish  You may need to limit how much sodium (salt) or protein you eat  Ask for information about the best foods for you  · Stay active  Your stones may pass more easily by if you stay active  Ask about the best activities for you  After you pass your kidney stones:  Once you have passed your kidney stones, your healthcare provider may  order a 24-hour urine test  Results from a 24-hour urine test will help your healthcare provider plan ways to prevent more stones from forming  If you are told to do a 24-hour test, your healthcare provider will give you more instructions  © 2017 [facility] [facility] Information is for End User's use only and may not be sold, redistributed or otherwise used for commercial purposes  All illustrations and images included in CareNotes® are the copyrighted property of A D A M , Inc  or Gurvinder Alaniz  The above information is an  only  It is not intended as medical advice for individual conditions or treatments  Talk to your doctor, nurse or pharmacist before following any medical regimen to see if it is safe and effective for you  How to Strain Your Urine   WHAT YOU NEED TO KNOW:   Urinate into a strainer (funnel with a fine mesh on the bottom) or glass jar to collect kidney stones  DISCHARGE INSTRUCTIONS:   Medicines:   · Pain medicine: You may be given medicine to take away or decrease pain   Do not wait until the pain is severe before you take your medicine  · NSAIDs:  These medicines decrease swelling, pain, and fever  NSAIDs are available without a doctor's order  Ask which medicine is right for you  Ask how much to take and when to take it  Take as directed  NSAIDs can cause stomach bleeding and kidney problems if not taken correctly  · Nausea medicine: This medicine calms your stomach and prevents or controls vomiting  · Take your medicine as directed  Contact your healthcare provider if you think your medicine is not helping or if you have side effects  Tell him of her if you are allergic to any medicine  Keep a list of the medicines, vitamins, and herbs you take  Include the amounts, and when and why you take them  Bring the list or the pill bottles to follow-up visits  Carry your medicine list with you in case of an emergency  Drink liquids as directed:  Drink about 3 liters of liquids each day, or as directed  That equals about 12 glasses of water or fruit juice  Half of your total daily liquids should be water  Limit coffee, tea, and soda to 2 cups daily  Your urine will be pale and clear if you are drinking enough liquid  Self-care:   · Activity:  Exercise, such as walking, may help decrease your pain  · Avoid heat:  Heat may cause you to sweat, urinate less, and become dehydrated  Follow up with your healthcare provider or urologist as directed:  Write down your questions so you remember to ask them during your visits  Contact your healthcare provider or urologist if:   · You have a fever and chills  · Your urine looks cloudy or has a bad smell  · You have burning pain when you urinate  · You have trouble urinating  · You are vomiting and it does not get better, even after you take medicine  · You have questions or concerns about your condition or care  Return to the emergency department if:   · You are not able to urinate  · You have severe pain in your lower abdomen or side      · Your heart flutters or beats faster than usual   © 2017 [address] [employer]  Information is for End User's use only and may not be sold, redistributed or otherwise used for commercial purposes  All illustrations and images included in CareNotes® are the copyrighted property of A D A M , Inc  or Gurvinder Alaniz  The above information is an  only  It is not intended as medical advice for individual conditions or treatments  Talk to your doctor, nurse or pharmacist before following any medical regimen to see if it is safe and effective for you  Acute Nausea and Vomiting, Ambulatory Care   GENERAL INFORMATION:   Acute nausea and vomiting  starts suddenly, gets worse quickly, and lasts a short time  Nausea and vomiting may be caused by pregnancy, alcohol, infection, or medicines  Common related symptoms include the following:   · Fever    · Abdominal swelling    · Pain, tenderness, or a lump in the abdomen    · Splashing sounds heard in your stomach when you move  Seek immediate care for the following symptoms:   · Blood in your vomit or bowel movements    · Sudden, severe pain in your chest and upper abdomen after hard vomiting    · Dizziness, dry mouth, and thirst    · Urinating very little or not at all    · Muscle weakness, leg cramps, and trouble breathing    · A heart beat that is faster than normal    · Vomiting for more than 48 hours  Treatment for acute nausea and vomiting  may include medicines to calm your stomach and stop the vomiting  You may need IV fluids if you are dehydrated  Manage your nausea and vomiting:   · Drink liquids as directed to prevent dehydration  Ask how much liquid to drink each day and which liquids are best for you  You may need to drink an oral rehydration solution (ORS)  ORS contains water, salts, and sugar that are needed to replace the lost body fluids  Ask what kind of ORS to use, how much to drink, and where to get it  · Eat smaller meals, more often    Eat small amounts of food every 2 to 3 hours, even if you are not hungry  Food in your stomach may help decrease your nausea  · Avoid stress  Find ways to relax and manage your stress  Headaches due to stress may cause nausea and vomiting  Get more rest and sleep  Follow up with your healthcare provider as directed:  Write down your questions so you remember to ask them during your visits  CARE AGREEMENT:   You have the right to help plan your care  Learn about your health condition and how it may be treated  Discuss treatment options with your caregivers to decide what care you want to receive  You always have the right to refuse treatment  The above information is an  only  It is not intended as medical advice for individual conditions or treatments  Talk to your doctor, nurse or pharmacist before following any medical regimen to see if it is safe and effective for you  © 2014 [address] is for End User's use only and may not be sold, redistributed or otherwise used for commercial purposes  All illustrations and images included in CareNotes® are the copyrighted property of A D A M [employer] Inc  or [employer]  Non-Alcoholic Fatty Liver Disease   WHAT YOU NEED TO KNOW:   Non-alcoholic fatty liver disease (NAFLD) is a buildup of fat in your liver from a condition other than alcoholism  DISCHARGE INSTRUCTIONS:   Medicines:   · Medicines  may be given to manage blood sugar or cholesterol levels  · Take your medicine as directed  Contact your healthcare provider if you think your medicine is not helping or if you have side effects  Tell him or her if you are allergic to any medicine  Keep a list of the medicines, vitamins, and herbs you take  Include the amounts, and when and why you take them  Bring the list or the pill bottles to follow-up visits  Carry your medicine list with you in case of an emergency    Follow up with your healthcare provider as directed: You may need to return for more tests  You may also be referred to a specialist  Write down your questions so you remember to ask them during your visits  Manage NAFLD:   · Maintain a healthy weight  Ask your healthcare provider how much you should weigh  Ask him to help you create a weight loss plan if you are overweight  · Exercise  Aerobic exercise 3 times a week for 20 to 45 minutes can help decrease fat buildup in your liver  Examples are cycling, brisk walking, and jogging  Ask your healthcare provider about the best exercise plan for you  · Eat healthy foods  Examples are vegetables, fruit, whole-grain breads, low-fat dairy products, beans, lean meats, and fish  Foods low in simple carbohydrates, high fructose corn syrup, and trans fat may help decrease fat buildup in your liver  · Do not drink alcohol  Alcohol may make NAFLD worse and harm your liver  Contact your healthcare provider if:   · You have increased pain or swelling in your abdomen  · You feel more tired than usual     · You bruise or bleed easily  · Your skin or the whites of your eyes look yellow  · You have questions or concerns about your condition or care  Return to the emergency department if:   · You have shortness of breath  · You have trouble thinking clearly or are confused  · You feel lightheaded or faint  · You have shaking, chills, and a fever  © 2017 [address] Information is for End User's use only and may not be sold, redistributed or otherwise used for commercial purposes  All illustrations and images included in CareNotes® are the copyrighted property of A D A M , Inc  or Gurvinder Alaniz  The above information is an  only  It is not intended as medical advice for individual conditions or treatments  Talk to your doctor, nurse or pharmacist before following any medical regimen to see if it is safe and effective for you

## 2018-03-30 NOTE — TELEPHONE ENCOUNTER
Dr Callaway patient  Patient CT showed mild left-sided hydroureteronephrosis with a 3 mm obstructing calculus at the left ureterovesical junction  What is the appropriate time frame for patient to be seen

## 2018-03-30 NOTE — TELEPHONE ENCOUNTER
Esvin Spivey for patient to be seen in 1 to 2 weeks  If patient established  Then can be seen by AP

## 2018-03-30 NOTE — TELEPHONE ENCOUNTER
Pt was seen at [facility] ED today for kidney stone  He has history of stones and is experiencing nausea, vomiting and pain  Please triage for appt  He is willing to be seen in [place] or [place]

## 2018-03-30 NOTE — ED PROVIDER NOTES
History  Chief Complaint   Patient presents with    Flank Pain     left sided flank pain that started this morning with nausea  pt has hx of kidney stones and believes this is one     Patient is a 26-year-old male with a history of kidney stones and reports starting with left flank pain that has been constant started at 4:00 a m  this morning, then nausea vomiting  The pain does radiate around his left side to L side abdomen  He reports symptoms feel similar to previous kidney stone  He denies fevers, chills, chest pain, shortness of breath, diarrhea, urinary symptoms  Past medical history also significant for hypertension  Patient did not take his BP medication this am             Prior to Admission Medications   Prescriptions Last Dose Informant Patient Reported? Taking? amphetamine-dextroamphetamine (ADDERALL, 10MG,) 10 mg tablet   Yes No   Sig: Take 1 tablet by mouth   methocarbamol (ROBAXIN) 500 mg tablet   No No   Sig: Take 1 tablet (500 mg total) by mouth 2 (two) times a day   ondansetron (ZOFRAN) 4 mg tablet   No No   Sig: Take 1 tablet by mouth every 6 (six) hours as needed for nausea or vomiting for up to 10 doses   tamsulosin (FLOMAX) 0 4 mg   No No   Sig: Take 1 capsule by mouth daily with dinner for 14 days      Facility-Administered Medications: None       Past Medical History:   Diagnosis Date    Asthma     childhood    Hypertension     Kidney stone        Past Surgical History:   Procedure Laterality Date    COLONOSCOPY      HEMORRHOID SURGERY      KNEE SURGERY Left     TN CYSTO/URETERO W/LITHOTRIPSY &INDWELL STENT INSRT Right 6/8/2017    Procedure: CYSTOSCOPY; URETEROSCOPY; RETROGRADE PYELOGRAM; HOLMIUM LASER LITHOTRIPSY; BASKET STONE EXTRACTION; STENT PLACEMENT ;  Surgeon: Alphonse Rodriguez MD;  [facility];  Service: Urology       History reviewed  No pertinent family history  I have reviewed and agree with the history as documented      Social History   Substance Use Topics    Smoking status: Former Smoker    Smokeless tobacco: Never Used      Comment: quit when 24 y  o   Alcohol use 3 6 oz/week     6 Cans of beer per week      Comment: 6 per week        Review of Systems   Constitutional: Negative for chills and fever  HENT: Negative for congestion, rhinorrhea, sinus pain and sore throat  Respiratory: Negative for cough, shortness of breath and wheezing  Cardiovascular: Negative for chest pain  Gastrointestinal: Positive for abdominal pain, nausea and vomiting  Negative for diarrhea  Genitourinary: Positive for flank pain (L flank pain)  Negative for dysuria and hematuria  Musculoskeletal: Negative for arthralgias and myalgias  Neurological: Negative for dizziness and headaches  All other systems reviewed and are negative  Physical Exam  ED Triage Vitals [03/30/18 0753]   Temperature Pulse Respirations Blood Pressure SpO2   98 2 °F (36 8 °C) 81 19 (!) 158/103 95 %      Temp Source Heart Rate Source Patient Position - Orthostatic VS BP Location FiO2 (%)   Oral Monitor Lying Right arm --      Pain Score       Worst Possible Pain           Orthostatic Vital Signs  Vitals:    03/30/18 0753 03/30/18 0922   BP: (!) 158/103 137/86   Pulse: 81 80   Patient Position - Orthostatic VS: Lying Lying       Physical Exam   Constitutional: He is oriented to person, place, and time  He appears well-developed and well-nourished  No distress  HENT:   Head: Normocephalic and atraumatic  Right Ear: Hearing, tympanic membrane, external ear and ear canal normal    Left Ear: Hearing, tympanic membrane, external ear and ear canal normal    Nose: Nose normal  No mucosal edema or rhinorrhea  Right sinus exhibits no maxillary sinus tenderness  Left sinus exhibits no maxillary sinus tenderness  Mouth/Throat: Uvula is midline, oropharynx is clear and moist and mucous membranes are normal  No oropharyngeal exudate     Eyes: Conjunctivae, EOM and lids are normal  Pupils are equal, round, and reactive to light  Neck: Normal range of motion  Neck supple  Cardiovascular: Normal rate, regular rhythm and normal heart sounds  Pulmonary/Chest: Effort normal and breath sounds normal    Abdominal: Soft  Normal appearance and bowel sounds are normal  There is no tenderness  There is no rigidity, no rebound and no guarding  CVA tenderness: +L CVAT  Musculoskeletal:   Non-focal with FROM upper, lower extremities, neck, chest and back   Lymphadenopathy:     He has no cervical adenopathy  Neurological: He is alert and oriented to person, place, and time  Appropriate speech and behavior   Non-focal  No sensory deficits noted, no motor deficits noted       ED Medications  Medications   sodium chloride 0 9 % bolus 1,000 mL (0 mL Intravenous Stopped 3/30/18 0921)   ondansetron (ZOFRAN) injection 4 mg (4 mg Intravenous Given 3/30/18 0806)   morphine (PF) 4 mg/mL injection 4 mg (4 mg Intravenous Given 3/30/18 0820)   ketorolac (TORADOL) injection 30 mg (30 mg Intravenous Given 3/30/18 0820)   fentanyl citrate (PF) 100 MCG/2ML 50 mcg (50 mcg Intravenous Given 3/30/18 0919)       Diagnostic Studies  Results Reviewed     Procedure Component Value Units Date/Time    POCT urinalysis dipstick [[id number]]  (Abnormal) Resulted:  03/30/18 0858    Lab Status:  Final result Updated:  03/30/18 0859     Color, UA Mary     Clarity, UA Clear     EXT Glucose, UA (Ref: Negative) Negative     EXT Bilirubin, UA (Ref: Negative) Negative     EXT Ketones, UA (Ref: Negative) Negative     EXT Spec Grav, UA 1 030     EXT Blood, UA (Ref: Negative) Large     EXT pH, UA 6 0     EXT Protein, UA (Ref: Negative) 30 (+)     EXT Urobilinogen, UA (Ref: 0 2- 1 0) Negative     EXT Leukocytes, UA (Ref: Negative) Negative     EXT Nitrite, UA (Ref: Negative) Negative    Comprehensive metabolic panel [[id number]]  (Abnormal) Collected:  03/30/18 0805    Lab Status:  Final result Specimen:  Blood from Arm, Right Updated:  03/30/18 0829     Sodium 139 mmol/L      Potassium 4 1 mmol/L      Chloride 103 mmol/L      CO2 27 mmol/L      Anion Gap 9 mmol/L      BUN 15 mg/dL      Creatinine 0 87 mg/dL      Glucose 149 (H) mg/dL      Calcium 8 8 mg/dL      AST 48 (H) U/L       (H) U/L      Alkaline Phosphatase 57 U/L      Total Protein 7 1 g/dL      Albumin 3 6 g/dL      Total Bilirubin 0 80 mg/dL      eGFR 106 ml/min/1 73sq m     Narrative:         National Kidney Disease Education Program recommendations are as follows:  GFR calculation is accurate only with a steady state creatinine  Chronic Kidney disease less than 60 ml/min/1 73 sq  meters  Kidney failure less than 15 ml/min/1 73 sq  meters  Lipase [[id number]]  (Normal) Collected:  03/30/18 0805    Lab Status:  Final result Specimen:  Blood from Arm, Right Updated:  03/30/18 0829     Lipase 102 u/L     CBC and differential [[id number]]  (Normal) Collected:  03/30/18 0806    Lab Status:  Final result Specimen:  Blood from Arm, Right Updated:  03/30/18 0809     WBC 9 70 Thousand/uL      RBC 5 51 Million/uL      Hemoglobin 15 7 g/dL      Hematocrit 47 3 %      MCV 86 fL      MCH 28 5 pg      MCHC 33 2 g/dL      RDW 13 0 %      MPV 10 3 fL      Platelets 272 Thousands/uL      nRBC 0 /100 WBCs      Neutrophils Relative 56 %      Lymphocytes Relative 30 %      Monocytes Relative 12 %      Eosinophils Relative 2 %      Basophils Relative 1 %      Neutrophils Absolute 5 42 Thousands/µL      Lymphocytes Absolute 2 86 Thousands/µL      Monocytes Absolute 1 13 Thousand/µL      Eosinophils Absolute 0 18 Thousand/µL      Basophils Absolute 0 08 Thousands/µL                  CT renal stone study abdomen pelvis without contrast   Final Result by Shakila Smith MD (03/30 1938)   1  Mild left-sided hydroureteronephrosis with a 3 mm calculus at the left ureterovesical junction  2   Hepatic steatosis  3   Colonic diverticulosis without evidence of acute diverticulitis        Workstation performed: [id number] Procedures  Procedures       Phone Contacts  ED Phone Contact    ED Course  ED Course                                MDM  Number of Diagnoses or Management Options  Hepatic steatosis:   Kidney stone:   Nausea & vomiting:   Diagnosis management comments: Patient is a 54-year-old male with a history of kidney stones presents with left flank pain, nausea and vomiting he states similar symptoms to previous kidney stone presentation  Plan is to check routine labs, urine, CT renal stone protocol  Fluids, Zofran, Toradol and morphine ordered for symptoms will then reassess  0900 upon reassessment patient states that symptoms initially improved, but now pain meds are starting to wear off  We discussed results including CT showing mild left-sided hydroureter nephrosis with a 3 mm obstructing calculus at the left ureterovesical junction, hepatic steatosis, and colonic diverticulosis  CBC is unremarkable  CMP shows elevated liver function tests likely secondary to fatty liver  Patient given copies of both his lab work and CT report for follow-up with urologist as well as primary care physician  1000 upon reassessment pain has improved  Patient has continued to remain both hemodynamically and clinically stable while in the ED with no further episodes nausea vomiting  He is stable for discharge  Patient given urine strainer to go and instructed on use by nurse  Prescriptions written for ibuprofen, Percocet for breakthrough pain and Zofran  Recommend patient contact Urology and schedule follow-up appointment ASAP  Also for follow-up with family physician as previously discussed  Both verbal and written discharge instructions provided  Adequate time was allowed to answer any questions  Recommend follow up with family doctor or referral physician as discussed, sooner if symptoms persist, change or worsen  Red flag symptoms as well as reasons to return to the ED discussed   Pt verbally understood treatment plan and was discharged home in stable condition  CritCare Time    Disposition  Final diagnoses:   Kidney stone   Hepatic steatosis   Nausea & vomiting     Time reflects when diagnosis was documented in both MDM as applicable and the Disposition within this note     Time User Action Codes Description Comment    3/30/2018  9:13 AM Arin WILSON Add [N20 0] Kidney stone     3/30/2018  9:13 AM Arin WILSON Add [K76 0] Hepatic steatosis     3/30/2018  9:13 AM Arin WILSON Add [R11 2] Nausea & vomiting       ED Disposition     ED Disposition Condition Comment    Discharge  Merrily Carrier discharge to home/self care  Condition at discharge: Good        Follow-up Information     Follow up With Specialties Details Why Felpia [facility]  50  For Urology [facility] Schedule an appointment as soon as possible for a visit  [address] [place] [phone]  [address] [place] [address], DO Family Medicine  Follow up with your family doctor for recheck and further evaluation elevated liver function tests and fatty liver  [address]          Discharge Medication List as of 3/30/2018  9:18 AM      START taking these medications    Details   ibuprofen (MOTRIN) 600 mg tablet Take 1 tablet every 6 hours with food as needed for pain/inflammation  , Print      !! ondansetron (ZOFRAN) 4 mg tablet Take 1-2 tabs every 8 hours as needed for nausea/vomiting, Print      oxyCODONE-acetaminophen (PERCOCET) 5-325 mg per tablet 1 tab every 4-6 hours as needed for breakthrough pain  May cause sedation, do not drive while taking , Print       !! - Potential duplicate medications found  Please discuss with provider        CONTINUE these medications which have NOT CHANGED    Details   amphetamine-dextroamphetamine (ADDERALL, 10MG,) 10 mg tablet Take 1 tablet by mouth, Until Discontinued, Historical Med      methocarbamol (ROBAXIN) 500 mg tablet Take 1 tablet (500 mg total) by mouth 2 (two) times a day, Starting Sat 3/10/2018, Normal      !! ondansetron (ZOFRAN) 4 mg tablet Take 1 tablet by mouth every 6 (six) hours as needed for nausea or vomiting for up to 10 doses, Starting 5/27/2017, Until Discontinued, Print      tamsulosin (FLOMAX) 0 4 mg Take 1 capsule by mouth daily with dinner for 14 days, Starting 5/27/2017, Until Sat 6/10/17, Print       !! - Potential duplicate medications found  Please discuss with provider  No discharge procedures on file      ED Provider  Electronically Signed by           Chaitanya Gastelum PA-C  03/30/18 [facility]  03/30/18 6212

## 2018-07-19 DIAGNOSIS — N20.0 CALCULUS OF KIDNEY: ICD-10-CM

## 2018-07-24 ENCOUNTER — OFFICE VISIT (OUTPATIENT)
Dept: UROLOGY | Facility: CLINIC | Age: 43
End: 2018-07-24
Payer: COMMERCIAL

## 2018-07-24 VITALS
HEART RATE: 82 BPM | SYSTOLIC BLOOD PRESSURE: 142 MMHG | WEIGHT: 240 LBS | DIASTOLIC BLOOD PRESSURE: 84 MMHG | HEIGHT: 71 IN | BODY MASS INDEX: 33.6 KG/M2

## 2018-07-24 DIAGNOSIS — N20.0 KIDNEY STONE: Primary | ICD-10-CM

## 2018-07-24 PROCEDURE — 99213 OFFICE O/P EST LOW 20 MIN: CPT | Performed by: PHYSICIAN ASSISTANT

## 2018-07-24 RX ORDER — LISINOPRIL 40 MG/1
TABLET ORAL
COMMUNITY
Start: 2018-06-21

## 2018-07-24 NOTE — PROGRESS NOTES
1  Kidney stone  XR abdomen 1 view kub     Assessment and plan:       1  History of nephrolithiasis managed by Dr Manuel Barreto  - patient had passed a small 3 mm calculus on his own since he was seen last   His CT from March 2018 was otherwise negative for any further intrarenal calculi  - we had a long discussion in the office today in regards to hydration, dietary modifications, as well as dietary citrate supplementation with lemon, many, orange juice, etc   - patient will follow up in 1 year with a KUB prior to visit  Should he have continued progression of kidney stone formation, he will likely require coupon developed evaluation at that time  - he is aware to contact us in the interim with any signs and symptoms of passing stone  All questions answered  Gail Sneed PA-C      Chief Complaint     F/u nephrolithiasis    History of Present Illness     Roz Curry is a 39 y o  male patient Dr Manuel Barreto with a history of nephrolithiasis presenting for follow-up    Patient has known history of nephrolithiasis  Has previously underwent gone ureteroscopy for a 10 millimeter right UPJ calculus in 6/2017  Patient had been seen in the interim at the emergency department 03/30/2018  This revealed mild left hydronephrosis secondary to a 3 millimeter left UVJ calculus  Patient was able to pass the stone his own shortly thereafter  Patient denies any residual lower urinary tract symptoms or flank pain  Patient states that he did that and 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  He is actively been trying to ensure that he is consuming fluids however  He is also trying to make dietary modifications in order to minimize future stone formation  Patient denies any supplemental calcium intake  He has been consuming with the green vegetables in moderation and has minimize coffee, tea, soda        Laboratory     Lab Results   Component Value Date    CREATININE 0 87 03/30/2018         Review of Systems     Review of Systems   Constitutional: Negative for activity change, appetite change, chills, diaphoresis, fatigue, fever and unexpected weight change  Respiratory: Negative for chest tightness and shortness of breath  Cardiovascular: Negative for chest pain, palpitations and leg swelling  Gastrointestinal: Negative for abdominal distention, abdominal pain, constipation, diarrhea, nausea and vomiting  Genitourinary: Negative for decreased urine volume, difficulty urinating, dysuria, enuresis, flank pain, frequency, genital sores, hematuria and urgency  Musculoskeletal: Negative for back pain, gait problem and myalgias  Skin: Negative for color change, pallor, rash and wound  Psychiatric/Behavioral: Negative for behavioral problems  The patient is not nervous/anxious  Allergies     No Known Allergies    Physical Exam     Physical Exam   Constitutional: He is oriented to person, place, and time  He appears well-developed and well-nourished  No distress  HENT:   Head: Normocephalic and atraumatic  Eyes: Conjunctivae are normal    Neck: Normal range of motion  No tracheal deviation present  Pulmonary/Chest: Effort normal    Musculoskeletal: Normal range of motion  He exhibits no edema or deformity  Neurological: He is alert and oriented to person, place, and time  Skin: Skin is warm and dry  No rash noted  He is not diaphoretic  No erythema  Psychiatric: He has a normal mood and affect   His behavior is normal          Vital Signs     Vitals:    07/24/18 0903   BP: 142/84   BP Location: Left arm   Patient Position: Sitting   Cuff Size: Standard   Pulse: 82   Weight: 109 kg (240 lb)   Height: 5' 11" (1 803 m)         Current Medications       Current Outpatient Prescriptions:     lisinopril (ZESTRIL) 40 mg tablet, , Disp: , Rfl:     amphetamine-dextroamphetamine (ADDERALL, 10MG,) 10 mg tablet, Take 1 tablet by mouth, Disp: , Rfl:    ibuprofen (MOTRIN) 600 mg tablet, Take 1 tablet every 6 hours with food as needed for pain/inflammation  , Disp: 30 tablet, Rfl: 0    methocarbamol (ROBAXIN) 500 mg tablet, Take 1 tablet (500 mg total) by mouth 2 (two) times a day, Disp: 20 tablet, Rfl: 0    ondansetron (ZOFRAN) 4 mg tablet, Take 1 tablet by mouth every 6 (six) hours as needed for nausea or vomiting for up to 10 doses, Disp: 10 tablet, Rfl: 0    ondansetron (ZOFRAN) 4 mg tablet, Take 1-2 tabs every 8 hours as needed for nausea/vomiting, Disp: 12 tablet, Rfl: 0    oxyCODONE-acetaminophen (PERCOCET) 5-325 mg per tablet, 1 tab every 4-6 hours as needed for breakthrough pain  May cause sedation, do not drive while taking , Disp: 20 tablet, Rfl: 0    tamsulosin (FLOMAX) 0 4 mg, Take 1 capsule by mouth daily with dinner for 14 days, Disp: 14 capsule, Rfl: 0      Active Problems     There is no problem list on file for this patient          Past Medical History     Past Medical History:   Diagnosis Date    Asthma     childhood    Hypertension     Kidney stone          Surgical History     Past Surgical History:   Procedure Laterality Date    COLONOSCOPY      HEMORRHOID SURGERY      KNEE SURGERY Left     [place] CYSTO/URETERO W/LITHOTRIPSY &INDWELL STENT INSRT Right 6/8/2017    Procedure: CYSTOSCOPY; URETEROSCOPY; RETROGRADE PYELOGRAM; HOLMIUM LASER LITHOTRIPSY; BASKET STONE EXTRACTION; STENT PLACEMENT ;  Surgeon: Glynn Cole MD;  [facility];  Service: Urology         Family History     Family History   Problem Relation Age of Onset    No Known Problems Mother          Social History     Social History       Radiology     CT ABDOMEN AND PELVIS WITHOUT IV CONTRAST - LOW DOSE RENAL STONE      INDICATION:   L flank pain, h/o kidney stone      COMPARISON:  CT abdomen pelvis 5/27/2017     TECHNIQUE:  Low dose thin section CT examination of the abdomen and pelvis was performed without intravenous or oral contrast according to a protocol specifically designed to evaluate for urinary tract calculus  Axial, sagittal, and coronal 2D   reformatted images were created from the source data and submitted for interpretation  Evaluation for pathology in the abdomen and pelvis that is unrelated to urinary tract calculi is limited       Radiation dose length product (DLP) for this visit:  702 mGy-cm   This examination, like all CT scans performed in the [facility], was performed utilizing techniques to minimize radiation dose exposure, including the use of iterative   reconstruction and automated exposure control       FINDINGS:     RIGHT KIDNEY AND URETER:  No urinary tract calculi  No hydronephrosis or hydroureter      LEFT KIDNEY AND URETER:  There is mild left-sided hydroureteronephrosis with a 3 mm obstructing calculus at the left ureterovesical junction      URINARY BLADDER:   Unremarkable       Bibasilar dependent atelectasis      Visualized portion of the liver is diffusely decreased in density, consistent with hepatic steatosis  Limited low radiation dose noncontrast CT evaluation demonstrates no other clinically significant abnormality of liver, spleen, pancreas, or adrenal   glands  No calcified gallstones or gallbladder wall thickening noted  No ascites or bulky lymphadenopathy on this limited noncontrast study  Colonic diverticula are noted, without evidence to suggest acute diverticulitis  Visualized bowel appears otherwise unremarkable  Limited evaluation demonstrates no evidence to suggest acute appendicitis  No acute fracture or destructive osseous lesion is identified  Small fat-containing left inguinal hernia and small fat-containing ventral abdominal wall hernia      IMPRESSION:  1  Mild left-sided hydroureteronephrosis with a 3 mm calculus at the left ureterovesical junction  2   Hepatic steatosis  3   Colonic diverticulosis without evidence of acute diverticulitis

## 2018-08-25 ENCOUNTER — OFFICE VISIT (OUTPATIENT)
Dept: URGENT CARE | Facility: CLINIC | Age: 43
End: 2018-08-25
Payer: COMMERCIAL

## 2018-08-25 VITALS
HEIGHT: 70 IN | BODY MASS INDEX: 34.22 KG/M2 | TEMPERATURE: 97.6 F | RESPIRATION RATE: 16 BRPM | OXYGEN SATURATION: 96 % | HEART RATE: 68 BPM | WEIGHT: 239 LBS | DIASTOLIC BLOOD PRESSURE: 90 MMHG | SYSTOLIC BLOOD PRESSURE: 130 MMHG

## 2018-08-25 DIAGNOSIS — S51.831A PUNCTURE WOUND OF RIGHT FOREARM, INITIAL ENCOUNTER: Primary | ICD-10-CM

## 2018-08-25 PROCEDURE — 99212 OFFICE O/P EST SF 10 MIN: CPT | Performed by: PHYSICIAN ASSISTANT

## 2018-08-25 PROCEDURE — 90715 TDAP VACCINE 7 YRS/> IM: CPT

## 2018-08-25 NOTE — PATIENT INSTRUCTIONS
Patient Instructions     Tetanus vaccine administered here  Monitor for infection  Follow up with PCP in 3-5 days  Proceed to  ER if symptoms worsen

## 2018-08-25 NOTE — PROGRESS NOTES
[address] [employer]        NAME: Aurelia Manriquez is a 39 y o  male  : 1975    MRN: [id number]  DATE: 2018  TIME: 8:50 AM    Assessment and Plan   Puncture wound of right forearm, initial encounter [L57 501C]  1  Puncture wound of right forearm, initial encounter           Patient Instructions     Tetanus vaccine administered here  Monitor for infection  Follow up with PCP in 3-5 days  Proceed to  ER if symptoms worsen  Chief Complaint     Chief Complaint   Patient presents with    Laceration     faraz nail puncture right arm x yesterday, unsure of last TD         History of Present Illness       50-year-old male reports with right forearm puncture wound on a nail yesterday  Tetanus status is unknown  He believes it was over 10 years ago  No fever or chills  No pain or drainage  He cleaned out at home  Again the wound is 24 hours old          Review of Systems   Review of Systems      Current Medications       Current Outpatient Prescriptions:     lisinopril (ZESTRIL) 40 mg tablet, , Disp: , Rfl:     Current Allergies     Allergies as of 2018    (No Known Allergies)            The following portions of the patient's history were reviewed and updated as appropriate: allergies, current medications, past family history, past medical history, past social history, past surgical history and problem list      Past Medical History:   Diagnosis Date    Asthma     childhood    Hypertension     Kidney stone        Past Surgical History:   Procedure Laterality Date    COLONOSCOPY      HEMORRHOID SURGERY      KNEE SURGERY Left     [place] CYSTO/URETERO W/LITHOTRIPSY &INDWELL STENT INSRT Right 2017    Procedure: CYSTOSCOPY; URETEROSCOPY; RETROGRADE PYELOGRAM; HOLMIUM LASER LITHOTRIPSY; BASKET STONE EXTRACTION; STENT PLACEMENT ;  Surgeon: Phineas Mohs, MD;  [facility];  Service: Urology       Family History   Problem Relation Age of Onset    No Known Problems Mother Medications have been verified  Objective   /90 (BP Location: Left arm, Patient Position: Sitting, Cuff Size: Standard)   Pulse 68   Temp 97 6 °F (36 4 °C) (Tympanic)   Resp 16   Ht 5' 10" (1 778 m)   Wt 108 kg (239 lb)   SpO2 96%   BMI 34 29 kg/m²        Physical Exam     Physical Exam   Constitutional: He appears well-developed and well-nourished  Skin:     Right ulnar side forearm small 3 mm puncture wound  This is dry with a scab  No erythema surrounding or drainage  Nontender  No purulence  Not indurated

## 2019-07-22 ENCOUNTER — APPOINTMENT (OUTPATIENT)
Dept: RADIOLOGY | Facility: MEDICAL CENTER | Age: 44
End: 2019-07-22
Payer: COMMERCIAL

## 2019-07-22 DIAGNOSIS — N20.0 KIDNEY STONE: ICD-10-CM

## 2019-07-22 PROCEDURE — 74018 RADEX ABDOMEN 1 VIEW: CPT

## 2019-07-24 NOTE — PROGRESS NOTES
There are no diagnoses linked to this encounter  Assessment and plan:       1  History of nephrolithiasis  - Patient denies any passage of stones in the last year  - KUB performed recently does not indicate any radiopaque urinary tract calculi  - Patient continues to work on hydration, dietary modifications, as well as dietary addition calcium citrate supplementation   - Patient was offered as needed follow-up but due to the amount of pain he was in previously, he would like to know if he is forming any stones in a year  - He will follow up in one year with a KUB prior  Terese Cranker, PA-C      Chief Complaint     Chief Complaint   Patient presents with    Nephrolithiasis         History of Present Illness     Michelle Narvaez is a 38 y o  male patient of Dr Bailey President with a history of nephrolithiasis presenting for follow-up  Patient has previously required ureteroscopy for 10 mm stone at the right UPJ in June of 2017  He was then seen in the emergency department on 03/30/2018 for mild left hydronephrosis secondary to a 3 mm left UVJ calculus  Patient was able to pass the stone on his own shortly thereafter  Patient reports difficulty remaining hydrated as he is a   Patient KUB performed 07/22/2019 that revealed a 3 mm curvilinear calcification over the left renal fossa that was thought to be artifact related to the bowel or vascular  This is not thought to be a stone  No radiopaque urinary tract calculi noted  No symptoms in the last year  He is trying to drink more water and less iced tea  Laboratory     Lab Results   Component Value Date    CREATININE 0 87 03/30/2018       No results found for: PSA    No results found for this or any previous visit (from the past 1 hour(s))  Review of Systems     Review of Systems   Constitutional: Negative for chills and fever  HENT: Negative  Eyes: Negative  Respiratory: Negative for shortness of breath  Cardiovascular: Negative for chest pain  Gastrointestinal: Negative for constipation, diarrhea, nausea and vomiting  Genitourinary: Negative for difficulty urinating, dysuria, enuresis, flank pain, frequency, hematuria and urgency  Musculoskeletal: Negative  Neurological: Negative  Psychiatric/Behavioral: Negative  Allergies     No Known Allergies    Physical Exam     Physical Exam   Constitutional: He is oriented to person, place, and time  He appears well-developed and well-nourished  No distress  HENT:   Head: Normocephalic and atraumatic  Right Ear: External ear normal    Left Ear: External ear normal    Nose: Nose normal    Eyes: Right eye exhibits no discharge  Left eye exhibits no discharge  No scleral icterus  Cardiovascular: Normal rate and regular rhythm  Pulmonary/Chest: Effort normal    Abdominal: Soft  He exhibits no distension  There is no tenderness  There is no guarding  Genitourinary:   Genitourinary Comments: Negative for CVA tenderness   Musculoskeletal:   Ambulates independently   Neurological: He is alert and oriented to person, place, and time  Skin: Skin is warm and dry  He is not diaphoretic  Psychiatric: He has a normal mood and affect  His behavior is normal  Judgment and thought content normal    Nursing note and vitals reviewed          Vital Signs     Vitals:    07/29/19 0838   BP: 132/84   Weight: 115 kg (254 lb)   Height: 5' 10" (1 778 m)         Current Medications       Current Outpatient Medications:     lisinopril (ZESTRIL) 40 mg tablet, , Disp: , Rfl:     ALPRAZolam (XANAX) 0 25 mg tablet, alprazolam 0 25 mg tablet, Disp: , Rfl:     amLODIPine (NORVASC) 10 mg tablet, amlodipine 10 mg tablet, Disp: , Rfl:     amphetamine-dextroamphetamine (ADDERALL) 20 mg tablet, dextroamphetamine-amphetamine 20 mg tablet, Disp: , Rfl:     azithromycin (ZITHROMAX) 250 mg tablet, Zithromax Z-Darryl 250 mg tablet  TAKE 2 TABLETS (500 MG) BY ORAL ROUTE ONCE DAILY FOR 1 DAY THEN 1 TABLET (250 MG) BY ORAL ROUTE ONCE DAILY FOR 4 DAYS, Disp: , Rfl:     baclofen 10 mg tablet, baclofen 10 mg tablet, Disp: , Rfl:     ciprofloxacin (CIPRO) 500 mg tablet, ciprofloxacin 500 mg tablet, Disp: , Rfl:     diazepam (VALIUM) 5 mg tablet, diazepam 5 mg tablet, Disp: , Rfl:     HYDROcodone-acetaminophen (NORCO) 5-325 mg per tablet, hydrocodone 5 mg-acetaminophen 325 mg tablet, Disp: , Rfl:     hydrocortisone (ANUCORT-HC) 25 mg suppository, Anucort-HC 25 mg suppository, Disp: , Rfl:     ibuprofen (MOTRIN) 600 mg tablet, ibuprofen 600 mg tablet, Disp: , Rfl:     levofloxacin (LEVAQUIN) 500 mg tablet, levofloxacin 500 mg tablet, Disp: , Rfl:     levothyroxine 25 mcg tablet, levothyroxine 25 mcg tablet, Disp: , Rfl:     nebivolol (BYSTOLIC) 10 mg tablet, Bystolic 10 mg tablet, Disp: , Rfl:     nebivolol (BYSTOLIC) 5 mg tablet, Every 12 hours, Disp: , Rfl:     ondansetron (ZOFRAN) 4 mg tablet, ondansetron HCl 4 mg tablet, Disp: , Rfl:     oxyCODONE-acetaminophen (PERCOCET) 5-325 mg per tablet, oxycodone-acetaminophen 5 mg-325 mg tablet, Disp: , Rfl:     sildenafil (VIAGRA) 100 mg tablet, sildenafil 100 mg tablet, Disp: , Rfl:     tamsulosin (FLOMAX) 0 4 mg, tamsulosin 0 4 mg capsule, Disp: , Rfl:     traMADol (ULTRAM) 50 mg tablet, tramadol 50 mg tablet, Disp: , Rfl:     vardenafil (LEVITRA) 20 MG tablet, Levitra 20 mg tablet  Take 1 tablet as needed by oral route , Disp: , Rfl:       Active Problems     There is no problem list on file for this patient          Past Medical History     Past Medical History:   Diagnosis Date    Asthma     childhood    Hypertension     Kidney stone          Surgical History     Past Surgical History:   Procedure Laterality Date    COLONOSCOPY      HEMORRHOID SURGERY      KNEE SURGERY Left     [place] CYSTO/URETERO W/LITHOTRIPSY &INDWELL STENT INSRT Right 6/8/2017    Procedure: CYSTOSCOPY; URETEROSCOPY; RETROGRADE PYELOGRAM; HOLMIUM LASER LITHOTRIPSY; BASKET STONE EXTRACTION; STENT PLACEMENT ;  Surgeon: Paula Blum MD;  [facility];  Service: Urology         Family History     Family History   Problem Relation Age of Onset    No Known Problems Mother          Social History     Social History       Radiology

## 2019-07-25 RX ORDER — DEXTROAMPHETAMINE SACCHARATE, AMPHETAMINE ASPARTATE, DEXTROAMPHETAMINE SULFATE AND AMPHETAMINE SULFATE 5; 5; 5; 5 MG/1; MG/1; MG/1; MG/1
TABLET ORAL
COMMUNITY
End: 2019-12-26

## 2019-07-25 RX ORDER — CIPROFLOXACIN 500 MG/1
TABLET, FILM COATED ORAL
COMMUNITY
End: 2019-12-26

## 2019-07-25 RX ORDER — ONDANSETRON 4 MG/1
TABLET, FILM COATED ORAL
COMMUNITY
End: 2019-12-26

## 2019-07-25 RX ORDER — HYDROCORTISONE ACETATE 25 MG/1
SUPPOSITORY RECTAL
COMMUNITY
Start: 2015-03-02 | End: 2019-12-26

## 2019-07-25 RX ORDER — SILDENAFIL 100 MG/1
TABLET, FILM COATED ORAL
COMMUNITY
End: 2019-12-26

## 2019-07-25 RX ORDER — AMLODIPINE BESYLATE 10 MG/1
TABLET ORAL
COMMUNITY
End: 2019-12-26

## 2019-07-25 RX ORDER — LEVOTHYROXINE SODIUM 0.03 MG/1
TABLET ORAL
COMMUNITY
End: 2019-12-26

## 2019-07-25 RX ORDER — TRAMADOL HYDROCHLORIDE 50 MG/1
TABLET ORAL
COMMUNITY
End: 2019-12-25

## 2019-07-25 RX ORDER — NEBIVOLOL 5 MG/1
TABLET ORAL EVERY 12 HOURS
COMMUNITY
End: 2019-12-26

## 2019-07-25 RX ORDER — ALPRAZOLAM 0.25 MG/1
TABLET ORAL
COMMUNITY
End: 2019-12-26

## 2019-07-25 RX ORDER — TAMSULOSIN HYDROCHLORIDE 0.4 MG/1
CAPSULE ORAL
COMMUNITY
End: 2019-12-26

## 2019-07-25 RX ORDER — AZITHROMYCIN 250 MG/1
TABLET, FILM COATED ORAL
COMMUNITY
End: 2019-12-26

## 2019-07-25 RX ORDER — IBUPROFEN 600 MG/1
TABLET ORAL
COMMUNITY
End: 2019-12-26

## 2019-07-25 RX ORDER — NEBIVOLOL 10 MG/1
TABLET ORAL
COMMUNITY
End: 2019-12-26

## 2019-07-25 RX ORDER — VARDENAFIL HYDROCHLORIDE 20 MG/1
TABLET ORAL
COMMUNITY
End: 2019-12-26

## 2019-07-25 RX ORDER — LEVOFLOXACIN 500 MG/1
TABLET, FILM COATED ORAL
COMMUNITY
End: 2019-12-26

## 2019-07-25 RX ORDER — OXYCODONE HYDROCHLORIDE AND ACETAMINOPHEN 5; 325 MG/1; MG/1
TABLET ORAL
COMMUNITY
End: 2019-12-25

## 2019-07-25 RX ORDER — HYDROCODONE BITARTRATE AND ACETAMINOPHEN 5; 325 MG/1; MG/1
TABLET ORAL
COMMUNITY
End: 2019-12-25

## 2019-07-25 RX ORDER — DIAZEPAM 5 MG/1
TABLET ORAL
COMMUNITY
End: 2019-12-26

## 2019-07-25 RX ORDER — BACLOFEN 10 MG/1
TABLET ORAL
COMMUNITY
End: 2019-12-26

## 2019-07-29 ENCOUNTER — OFFICE VISIT (OUTPATIENT)
Dept: UROLOGY | Facility: CLINIC | Age: 44
End: 2019-07-29
Payer: COMMERCIAL

## 2019-07-29 VITALS
BODY MASS INDEX: 36.36 KG/M2 | HEIGHT: 70 IN | DIASTOLIC BLOOD PRESSURE: 84 MMHG | WEIGHT: 254 LBS | SYSTOLIC BLOOD PRESSURE: 132 MMHG

## 2019-07-29 DIAGNOSIS — N20.0 KIDNEY STONE: Primary | ICD-10-CM

## 2019-07-29 PROCEDURE — 99213 OFFICE O/P EST LOW 20 MIN: CPT | Performed by: PHYSICIAN ASSISTANT

## 2019-10-26 ENCOUNTER — TRANSCRIBE ORDERS (OUTPATIENT)
Dept: ADMINISTRATIVE | Facility: HOSPITAL | Age: 44
End: 2019-10-26

## 2019-10-26 DIAGNOSIS — Z13.9 SCREENING FOR UNSPECIFIED CONDITION: Primary | ICD-10-CM

## 2019-11-01 ENCOUNTER — APPOINTMENT (OUTPATIENT)
Dept: LAB | Facility: CLINIC | Age: 44
End: 2019-11-01

## 2019-11-01 LAB
CHOLEST SERPL-MCNC: 132 MG/DL (ref 50–200)
HDLC SERPL-MCNC: 37 MG/DL
LDLC SERPL CALC-MCNC: 83 MG/DL (ref 0–100)
NONHDLC SERPL-MCNC: 95 MG/DL
TRIGL SERPL-MCNC: 62 MG/DL

## 2019-11-01 PROCEDURE — 80061 LIPID PANEL: CPT | Performed by: NURSE PRACTITIONER

## 2019-12-25 ENCOUNTER — APPOINTMENT (EMERGENCY)
Dept: RADIOLOGY | Facility: HOSPITAL | Age: 44
End: 2019-12-25
Payer: COMMERCIAL

## 2019-12-25 ENCOUNTER — HOSPITAL ENCOUNTER (EMERGENCY)
Facility: HOSPITAL | Age: 44
Discharge: HOME | End: 2019-12-25
Attending: EMERGENCY MEDICINE | Admitting: EMERGENCY MEDICINE
Payer: COMMERCIAL

## 2019-12-25 VITALS
DIASTOLIC BLOOD PRESSURE: 109 MMHG | SYSTOLIC BLOOD PRESSURE: 181 MMHG | HEART RATE: 98 BPM | OXYGEN SATURATION: 99 % | RESPIRATION RATE: 16 BRPM | TEMPERATURE: 97.9 F

## 2019-12-25 DIAGNOSIS — S46.219A BICEPS TENDON RUPTURE: Primary | ICD-10-CM

## 2019-12-25 PROCEDURE — 99283 EMERGENCY DEPT VISIT LOW MDM: CPT

## 2019-12-25 PROCEDURE — 73060 X-RAY EXAM OF HUMERUS: CPT

## 2019-12-25 PROCEDURE — 99284 EMERGENCY DEPT VISIT MOD MDM: CPT | Performed by: EMERGENCY MEDICINE

## 2019-12-25 PROCEDURE — 73070 X-RAY EXAM OF ELBOW: CPT

## 2019-12-25 RX ORDER — HYDROCODONE BITARTRATE AND ACETAMINOPHEN 5; 325 MG/1; MG/1
1 TABLET ORAL EVERY 6 HOURS PRN
Qty: 10 TABLET | Refills: 0 | Status: SHIPPED | OUTPATIENT
Start: 2019-12-25 | End: 2020-01-04

## 2019-12-25 RX ORDER — METHOCARBAMOL 500 MG/1
1000 TABLET, FILM COATED ORAL ONCE
Status: COMPLETED | OUTPATIENT
Start: 2019-12-25 | End: 2019-12-25

## 2019-12-25 RX ORDER — HYDROCODONE BITARTRATE AND ACETAMINOPHEN 5; 325 MG/1; MG/1
1 TABLET ORAL ONCE
Status: COMPLETED | OUTPATIENT
Start: 2019-12-25 | End: 2019-12-25

## 2019-12-25 RX ADMIN — HYDROCODONE BITARTRATE AND ACETAMINOPHEN 1 TABLET: 5; 325 TABLET ORAL at 14:01

## 2019-12-25 RX ADMIN — METHOCARBAMOL TABLETS 1000 MG: 500 TABLET, COATED ORAL at 14:01

## 2019-12-25 NOTE — ED PROVIDER NOTES
History  Chief Complaint   Patient presents with    Arm Pain     pt reports left arm pain and "ripping sensation"     38year old male patient presents emergency department for evaluation of left arm injury  Patient states that he was trying to lift a folding table from behind the generator when he felt something pop  The patient has what appears to be a partial tears left biceps muscle  The muscle seems to still be attached to in the medial aspect of the insertion point but the lateral aspect seems to be loose  X-ray will be done to assess for possible avulsion fracture that are present patient will be discharged follow up with Orthopedic surgery  History provided by:  Patient   used: No    Arm Injury   Location:  Arm  Arm location:  L arm  Injury: yes    Pain details:     Quality:  Aching    Radiates to:  Does not radiate    Severity:  Mild    Onset quality:  Sudden    Timing:  Constant    Progression:  Unchanged  Dislocation: no    Tetanus status:  Up to date  Prior injury to area:  No  Relieved by:  Nothing  Worsened by:  Nothing  Ineffective treatments:  None tried  Associated symptoms: no back pain, no decreased range of motion, no fatigue, no fever, no numbness and no tingling        Prior to Admission Medications   Prescriptions Last Dose Informant Patient Reported?  Taking?   lisinopril (ZESTRIL) 40 mg tablet  Self Yes No      Facility-Administered Medications: None       Past Medical History:   Diagnosis Date    Asthma     childhood    Hypertension     Kidney stone        Past Surgical History:   Procedure Laterality Date    COLONOSCOPY      HEMORRHOID SURGERY      KNEE SURGERY Left     [place] CYSTO/URETERO W/LITHOTRIPSY &INDWELL STENT INSRT Right 6/8/2017    Procedure: CYSTOSCOPY; URETEROSCOPY; RETROGRADE PYELOGRAM; HOLMIUM LASER LITHOTRIPSY; BASKET STONE EXTRACTION; STENT PLACEMENT ;  Surgeon: Izabella Garcia MD;  [facility];  Service: Urology       Family History Problem Relation Age of Onset    No Known Problems Mother      I have reviewed and agree with the history as documented  Social History     Tobacco Use    Smoking status: Former Smoker    Smokeless tobacco: Never Used    Tobacco comment: quit when 24 y o  Substance Use Topics    Alcohol use: Yes     Alcohol/week: 6 0 standard drinks     Types: 6 Cans of beer per week     Comment: 6 per week    Drug use: No        Review of Systems   Constitutional: Negative for fatigue and fever  Musculoskeletal: Negative for back pain  All other systems reviewed and are negative  Physical Exam  Physical Exam   Constitutional: He is oriented to person, place, and time  He appears well-developed and well-nourished  No distress  HENT:   Head: Normocephalic and atraumatic  Right Ear: External ear normal    Left Ear: External ear normal    Eyes: Conjunctivae and EOM are normal  Right eye exhibits no discharge  Left eye exhibits no discharge  No scleral icterus  Neck: Normal range of motion  Neck supple  No JVD present  No tracheal deviation present  No thyromegaly present  Cardiovascular: Normal rate and regular rhythm  Pulmonary/Chest: Effort normal and breath sounds normal  No stridor  No respiratory distress  He has no wheezes  He has no rales  Abdominal: Soft  Bowel sounds are normal  He exhibits no distension  There is no tenderness  Musculoskeletal: Normal range of motion  He exhibits no edema, tenderness or deformity  Arms:  Neurological: He is alert and oriented to person, place, and time  No cranial nerve deficit  Coordination normal    Skin: Skin is warm and dry  He is not diaphoretic  Psychiatric: He has a normal mood and affect  His behavior is normal    Nursing note and vitals reviewed        Vital Signs  ED Triage Vitals [12/25/19 1256]   Temperature Pulse Respirations Blood Pressure SpO2   97 9 °F (36 6 °C) 98 16 (!) 181/109 99 %      Temp Source Heart Rate Source Patient Position - Orthostatic VS BP Location FiO2 (%)   Oral Monitor Standing Right arm --      Pain Score       7           Vitals:    12/25/19 1256   BP: (!) 181/109   Pulse: 98   Patient Position - Orthostatic VS: Standing         Visual Acuity      ED Medications  Medications   methocarbamol (ROBAXIN) tablet 1,000 mg (1,000 mg Oral Given 12/25/19 1401)   HYDROcodone-acetaminophen (NORCO) 5-325 mg per tablet 1 tablet (1 tablet Oral Given 12/25/19 1401)       Diagnostic Studies  Results Reviewed     None                 XR humerus LEFT   Final Result by Junie Corbett MD (12/25 1186)      No acute osseous abnormality  Workstation performed: [id number]         XR elbow 2 views LEFT   Final Result by Junie Corbett MD (12/25 6653)      No acute osseous abnormality  Workstation performed: [id number]                    Procedures  Procedures         ED Course                               MDM  Number of Diagnoses or Management Options  Biceps tendon rupture: new and requires workup     Amount and/or Complexity of Data Reviewed  Tests in the radiology section of CPT®: ordered and reviewed  Decide to obtain previous medical records or to obtain history from someone other than the patient: yes  Review and summarize past medical records: yes    Patient Progress  Patient progress: stable        Disposition  Final diagnoses:   Biceps tendon rupture     Time reflects when diagnosis was documented in both MDM as applicable and the Disposition within this note     Time User Action Codes Description Comment    12/25/2019  1:40 PM [address], [address] Christiano Pak [[id number]] Biceps tendon rupture       ED Disposition     ED Disposition Condition Date/Time Comment    Discharge Stable Wed Dec 25, 2019  1:40 PM Jeannine Rivsa discharge to home/self care              Follow-up Information     Follow up With Specialties Details Why [address],  Family Medicine   [address]  [address] [place] [address] [address], MD Orthopedic Surgery   [address] [facility]  [address]  [place] [address]  [phone]            Discharge Medication List as of 12/25/2019  1:52 PM      START taking these medications    Details   HYDROcodone-acetaminophen (NORCO) 5-325 mg per tablet Take 1 tablet by mouth every 6 (six) hours as needed for pain for up to 10 daysMax Daily Amount: 4 tablets, Starting Wed 12/25/2019, Until Sat 1/4/2020, Print         CONTINUE these medications which have NOT CHANGED    Details   lisinopril (ZESTRIL) 40 mg tablet Starting Thu 6/21/2018, Historical Med      ALPRAZolam (XANAX) 0 25 mg tablet alprazolam 0 25 mg tablet, Historical Med      amLODIPine (NORVASC) 10 mg tablet amlodipine 10 mg tablet, Historical Med      amphetamine-dextroamphetamine (ADDERALL) 20 mg tablet dextroamphetamine-amphetamine 20 mg tablet, Historical Med      azithromycin (ZITHROMAX) 250 mg tablet Zithromax Z-Darryl 250 mg tablet   TAKE 2 TABLETS (500 MG) BY ORAL ROUTE ONCE DAILY FOR 1 DAY THEN 1 TABLET (250 MG) BY ORAL ROUTE ONCE DAILY FOR 4 DAYS, Historical Med      baclofen 10 mg tablet baclofen 10 mg tablet, Historical Med      ciprofloxacin (CIPRO) 500 mg tablet ciprofloxacin 500 mg tablet, Historical Med      diazepam (VALIUM) 5 mg tablet diazepam 5 mg tablet, Historical Med      hydrocortisone (ANUCORT-HC) 25 mg suppository Anucort-HC 25 mg suppository, Historical Med      ibuprofen (MOTRIN) 600 mg tablet ibuprofen 600 mg tablet, Historical Med      levofloxacin (LEVAQUIN) 500 mg tablet levofloxacin 500 mg tablet, Historical Med      levothyroxine 25 mcg tablet levothyroxine 25 mcg tablet, Historical Med      !! nebivolol (BYSTOLIC) 10 mg tablet Bystolic 10 mg tablet, Historical Med      !! nebivolol (BYSTOLIC) 5 mg tablet Every 12 hours, Historical Med      ondansetron (ZOFRAN) 4 mg tablet ondansetron HCl 4 mg tablet, Historical Med      sildenafil (VIAGRA) 100 mg tablet sildenafil 100 mg tablet, Historical Med      tamsulosin (FLOMAX) 0 4 mg tamsulosin 0 4 mg capsule, Historical Med      vardenafil (LEVITRA) 20 MG tablet Levitra 20 mg tablet   Take 1 tablet as needed by oral route , Historical Med       !! - Potential duplicate medications found  Please discuss with provider  No discharge procedures on file      ED Provider  Electronically Signed by           Lori Palencia DO  12/27/19 5007

## 2019-12-26 ENCOUNTER — OFFICE VISIT (OUTPATIENT)
Dept: OBGYN CLINIC | Facility: HOSPITAL | Age: 44
End: 2019-12-26
Payer: COMMERCIAL

## 2019-12-26 VITALS
SYSTOLIC BLOOD PRESSURE: 131 MMHG | HEIGHT: 70 IN | WEIGHT: 243 LBS | DIASTOLIC BLOOD PRESSURE: 90 MMHG | HEART RATE: 71 BPM | BODY MASS INDEX: 34.79 KG/M2

## 2019-12-26 DIAGNOSIS — M25.522 PAIN IN LEFT ELBOW: Primary | ICD-10-CM

## 2019-12-26 PROCEDURE — 99203 OFFICE O/P NEW LOW 30 MIN: CPT | Performed by: ORTHOPAEDIC SURGERY

## 2019-12-26 NOTE — PROGRESS NOTES
38 y o male presents to the office for evaluation of left elbow pain due to lifting a folding table on 12/25/2019  At the time of injury he felt ripping sensations  He is right hand dominant  He is experiencing pain, tenderness and swelling in his left elbow  His pain increases with supination  He denies any numbness or tingling      Review of Systems  Review of systems negative unless otherwise specified in HPI    Past Medical History  Past Medical History:   Diagnosis Date    Asthma     childhood    Hypertension     Kidney stone        Past Surgical History  Past Surgical History:   Procedure Laterality Date    COLONOSCOPY      HEMORRHOID SURGERY      KNEE SURGERY Left     [place] CYSTO/URETERO W/LITHOTRIPSY &INDWELL STENT INSRT Right 6/8/2017    Procedure: CYSTOSCOPY; URETEROSCOPY; RETROGRADE PYELOGRAM; HOLMIUM LASER LITHOTRIPSY; BASKET STONE EXTRACTION; STENT PLACEMENT ;  Surgeon: Yane Jones MD;  [facility];  Service: Urology       Current Medications  Current Outpatient Medications on File Prior to Visit   Medication Sig Dispense Refill    HYDROcodone-acetaminophen (NORCO) 5-325 mg per tablet Take 1 tablet by mouth every 6 (six) hours as needed for pain for up to 10 daysMax Daily Amount: 4 tablets 10 tablet 0    lisinopril (ZESTRIL) 40 mg tablet       [DISCONTINUED] ALPRAZolam (XANAX) 0 25 mg tablet alprazolam 0 25 mg tablet      [DISCONTINUED] amLODIPine (NORVASC) 10 mg tablet amlodipine 10 mg tablet      [DISCONTINUED] amphetamine-dextroamphetamine (ADDERALL) 20 mg tablet dextroamphetamine-amphetamine 20 mg tablet      [DISCONTINUED] azithromycin (ZITHROMAX) 250 mg tablet Zithromax Z-Darryl 250 mg tablet   TAKE 2 TABLETS (500 MG) BY ORAL ROUTE ONCE DAILY FOR 1 DAY THEN 1 TABLET (250 MG) BY ORAL ROUTE ONCE DAILY FOR 4 DAYS      [DISCONTINUED] baclofen 10 mg tablet baclofen 10 mg tablet      [DISCONTINUED] ciprofloxacin (CIPRO) 500 mg tablet ciprofloxacin 500 mg tablet      [DISCONTINUED] diazepam (VALIUM) 5 mg tablet diazepam 5 mg tablet      [DISCONTINUED] hydrocortisone (ANUCORT-HC) 25 mg suppository Anucort-HC 25 mg suppository      [DISCONTINUED] ibuprofen (MOTRIN) 600 mg tablet ibuprofen 600 mg tablet      [DISCONTINUED] levofloxacin (LEVAQUIN) 500 mg tablet levofloxacin 500 mg tablet      [DISCONTINUED] levothyroxine 25 mcg tablet levothyroxine 25 mcg tablet      [DISCONTINUED] nebivolol (BYSTOLIC) 10 mg tablet Bystolic 10 mg tablet      [DISCONTINUED] nebivolol (BYSTOLIC) 5 mg tablet Every 12 hours      [DISCONTINUED] ondansetron (ZOFRAN) 4 mg tablet ondansetron HCl 4 mg tablet      [DISCONTINUED] sildenafil (VIAGRA) 100 mg tablet sildenafil 100 mg tablet      [DISCONTINUED] tamsulosin (FLOMAX) 0 4 mg tamsulosin 0 4 mg capsule      [DISCONTINUED] vardenafil (LEVITRA) 20 MG tablet Levitra 20 mg tablet   Take 1 tablet as needed by oral route         Current Facility-Administered Medications on File Prior to Visit   Medication Dose Route Frequency Provider Last Rate Last Dose    [COMPLETED] HYDROcodone-acetaminophen (NORCO) 5-325 mg per tablet 1 tablet  1 tablet Oral Once Eston Willa, DO   1 tablet at 12/25/19 1401    [COMPLETED] methocarbamol (ROBAXIN) tablet 1,000 mg  1,000 mg Oral Once Eston Kaycee, DO   1,000 mg at 12/25/19 1401       Recent Labs (HCT,HGB,PT,INR,ESR,CRP,GLU,HgA1C)  0   Lab Value Date/Time    HCT 47 3 03/30/2018 0806    HGB 15 7 03/30/2018 0806    WBC 9 70 03/30/2018 0806    INR 0 92 06/02/2017 1256         Physical exam  · General: Awake, Alert, Oriented  · Eyes: Pupils equal, round and reactive to light  · Heart: regular rate and rhythm  · Lungs: No audible wheezing  · Abdomen: soft  Left upper extremity  · Tender to palpation over insertion of biceps  · Swelling present  · Ecchymosis present  · Full passive flexion and extension  · Pain with supination  · Sensation intact  · Skin is warm and well perfused    Assessment/Plan:   44 y o male with left elbow pain with concern for left biceps insertion rupture  He was also provided with a referral to Dr Vasquez Andrade  We will see him back as needed      Scribe Attestation    I,:   Dior Hernandez am acting as a scribe while in the presence of the attending physician :        I,:   Manuelito Holloway MD personally performed the services described in this documentation    as scribed in my presence :

## 2019-12-26 NOTE — LETTER
December 26, 2019     Patient: Vicki Reyes   YOB: 1975   Date of Visit: 12/26/2019       To Whom it May Concern:    Vicki Reyes is under my professional care  He was seen in my office on 12/26/2019  He is non-weightbearing to his left upper extremity until re-evaluation following his MRI  If you have any questions or concerns, please don't hesitate to call           Sincerely,          Linda Child MD

## 2020-01-03 ENCOUNTER — HOSPITAL ENCOUNTER (OUTPATIENT)
Dept: MRI IMAGING | Facility: HOSPITAL | Age: 45
Discharge: HOME | End: 2020-01-03
Attending: ORTHOPAEDIC SURGERY
Payer: COMMERCIAL

## 2020-01-03 ENCOUNTER — TELEPHONE (OUTPATIENT)
Dept: OBGYN CLINIC | Facility: HOSPITAL | Age: 45
End: 2020-01-03

## 2020-01-03 DIAGNOSIS — M25.522 PAIN IN LEFT ELBOW: ICD-10-CM

## 2020-01-03 PROCEDURE — 73221 MRI JOINT UPR EXTREM W/O DYE: CPT

## 2020-01-03 NOTE — TELEPHONE ENCOUNTER
Desi Simmons from [facility]#[id number]    Requested faxes of the 12/26/19 office visit notes and weight bearing status letter  Faxed to [phone]

## 2020-01-06 ENCOUNTER — OFFICE VISIT (OUTPATIENT)
Dept: OBGYN CLINIC | Facility: MEDICAL CENTER | Age: 45
End: 2020-01-06
Payer: COMMERCIAL

## 2020-01-06 ENCOUNTER — APPOINTMENT (OUTPATIENT)
Dept: LAB | Facility: CLINIC | Age: 45
End: 2020-01-06
Payer: COMMERCIAL

## 2020-01-06 VITALS
HEIGHT: 70 IN | BODY MASS INDEX: 35.13 KG/M2 | DIASTOLIC BLOOD PRESSURE: 93 MMHG | SYSTOLIC BLOOD PRESSURE: 148 MMHG | WEIGHT: 245.4 LBS | HEART RATE: 74 BPM

## 2020-01-06 DIAGNOSIS — Z01.812 PRE-OPERATIVE LABORATORY EXAMINATION: ICD-10-CM

## 2020-01-06 DIAGNOSIS — S46.212A RUPTURE OF LEFT DISTAL BICEPS TENDON, INITIAL ENCOUNTER: Primary | ICD-10-CM

## 2020-01-06 DIAGNOSIS — S46.212A RUPTURE OF LEFT DISTAL BICEPS TENDON, INITIAL ENCOUNTER: ICD-10-CM

## 2020-01-06 LAB
ANION GAP SERPL CALCULATED.3IONS-SCNC: 4 MMOL/L (ref 4–13)
BASOPHILS # BLD AUTO: 0.09 THOUSANDS/ΜL (ref 0–0.1)
BASOPHILS NFR BLD AUTO: 1 % (ref 0–1)
BUN SERPL-MCNC: 11 MG/DL (ref 5–25)
CALCIUM SERPL-MCNC: 9.6 MG/DL (ref 8.3–10.1)
CHLORIDE SERPL-SCNC: 108 MMOL/L (ref 100–108)
CO2 SERPL-SCNC: 27 MMOL/L (ref 21–32)
CREAT SERPL-MCNC: 0.71 MG/DL (ref 0.6–1.3)
EOSINOPHIL # BLD AUTO: 0.17 THOUSAND/ΜL (ref 0–0.61)
EOSINOPHIL NFR BLD AUTO: 2 % (ref 0–6)
ERYTHROCYTE [DISTWIDTH] IN BLOOD BY AUTOMATED COUNT: 13.2 % (ref 11.6–15.1)
GFR SERPL CREATININE-BSD FRML MDRD: 113 ML/MIN/1.73SQ M
GLUCOSE SERPL-MCNC: 109 MG/DL (ref 65–140)
HCT VFR BLD AUTO: 49.7 % (ref 36.5–49.3)
HGB BLD-MCNC: 16.3 G/DL (ref 12–17)
IMM GRANULOCYTES # BLD AUTO: 0.02 THOUSAND/UL (ref 0–0.2)
IMM GRANULOCYTES NFR BLD AUTO: 0 % (ref 0–2)
LYMPHOCYTES # BLD AUTO: 3 THOUSANDS/ΜL (ref 0.6–4.47)
LYMPHOCYTES NFR BLD AUTO: 31 % (ref 14–44)
MCH RBC QN AUTO: 29.1 PG (ref 26.8–34.3)
MCHC RBC AUTO-ENTMCNC: 32.8 G/DL (ref 31.4–37.4)
MCV RBC AUTO: 89 FL (ref 82–98)
MONOCYTES # BLD AUTO: 0.85 THOUSAND/ΜL (ref 0.17–1.22)
MONOCYTES NFR BLD AUTO: 9 % (ref 4–12)
NEUTROPHILS # BLD AUTO: 5.72 THOUSANDS/ΜL (ref 1.85–7.62)
NEUTS SEG NFR BLD AUTO: 57 % (ref 43–75)
NRBC BLD AUTO-RTO: 0 /100 WBCS
PLATELET # BLD AUTO: 284 THOUSANDS/UL (ref 149–390)
PMV BLD AUTO: 11.1 FL (ref 8.9–12.7)
POTASSIUM SERPL-SCNC: 4.3 MMOL/L (ref 3.5–5.3)
RBC # BLD AUTO: 5.6 MILLION/UL (ref 3.88–5.62)
SODIUM SERPL-SCNC: 139 MMOL/L (ref 136–145)
WBC # BLD AUTO: 9.85 THOUSAND/UL (ref 4.31–10.16)

## 2020-01-06 PROCEDURE — 85025 COMPLETE CBC W/AUTO DIFF WBC: CPT

## 2020-01-06 PROCEDURE — 80048 BASIC METABOLIC PNL TOTAL CA: CPT

## 2020-01-06 PROCEDURE — 99214 OFFICE O/P EST MOD 30 MIN: CPT | Performed by: ORTHOPAEDIC SURGERY

## 2020-01-06 PROCEDURE — 36415 COLL VENOUS BLD VENIPUNCTURE: CPT

## 2020-01-06 RX ORDER — CEFAZOLIN SODIUM 2 G/50ML
2000 SOLUTION INTRAVENOUS ONCE
Status: CANCELLED | OUTPATIENT
Start: 2020-01-06 | End: 2020-01-06

## 2020-01-06 RX ORDER — OXYCODONE HYDROCHLORIDE AND ACETAMINOPHEN 5; 325 MG/1; MG/1
1 TABLET ORAL EVERY 4 HOURS PRN
Qty: 30 TABLET | Refills: 0 | Status: SHIPPED | OUTPATIENT
Start: 2020-01-06 | End: 2020-01-20

## 2020-01-06 NOTE — PRE-PROCEDURE INSTRUCTIONS
Pre-Surgery Instructions:   Medication Instructions    lisinopril (ZESTRIL) 40 mg tablet Pt instructed to hold morning of surgery    oxyCODONE-acetaminophen (PERCOCET) 5-325 mg per tablet Patient was instructed by Physician and understands

## 2020-01-06 NOTE — H&P (VIEW-ONLY)
CHIEF COMPLAINT:  Chief Complaint   Patient presents with    Left Arm - Pain       SUBJECTIVE:  Russell Carcamo is a 38y o  year old RHD male who presents today for a surgical consultation for his left distal biceps rupture referred by Dr Mani Tomas  Patient initially was seen in the ED on 12/25/19, after sustaining an injury when trying to lift a folding table from behind a generator when he felt a 'pop'  He was seen by Dr Mani Tomas on 12/26/19, where an MRI of the left elbow was ordered to evaluate the partial distal biceps tear  Today he notes that he doesn't have much pain, just a fatigue feeling  He hasn't had to take any medications for the pain  He is a   The patient denies any cardiac or pulmonary issues  Denies diabetes  Denies any history of MI, gastric ulcers, + kidney stones, no liver issues  Denies blood thinners  PAST MEDICAL HISTORY:  Past Medical History:   Diagnosis Date    Asthma     childhood    Hypertension     Kidney stone        PAST SURGICAL HISTORY:  Past Surgical History:   Procedure Laterality Date    COLONOSCOPY      HEMORRHOID SURGERY      KNEE SURGERY Left     [place] CYSTO/URETERO W/LITHOTRIPSY &INDWELL STENT INSRT Right 6/8/2017    Procedure: CYSTOSCOPY; URETEROSCOPY; RETROGRADE PYELOGRAM; HOLMIUM LASER LITHOTRIPSY; BASKET STONE EXTRACTION; STENT PLACEMENT ;  Surgeon: Margart Hamman, MD;  Location: [facility];  Service: Urology       FAMILY HISTORY:  Family History   Problem Relation Age of Onset    No Known Problems Mother        SOCIAL HISTORY:  Social History     Tobacco Use    Smoking status: Former Smoker    Smokeless tobacco: Never Used    Tobacco comment: quit when 24 y o  Substance Use Topics    Alcohol use:  Yes     Alcohol/week: 6 0 standard drinks     Types: 6 Cans of beer per week     Comment: 6 per week    Drug use: No       MEDICATIONS:    Current Outpatient Medications:     lisinopril (ZESTRIL) 40 mg tablet, , Disp: , Rfl: ALLERGIES:  No Known Allergies    REVIEW OF SYSTEMS:  Review of Systems    VITALS:  Vitals:    01/06/20 0751   BP: 148/93   Pulse: 74       LABS:  HgA1c: No results found for: HGBA1C  BMP:   Lab Results   Component Value Date    CALCIUM 8 8 03/30/2018    K 4 1 03/30/2018    CO2 27 03/30/2018     03/30/2018    BUN 15 03/30/2018    CREATININE 0 87 03/30/2018       _____________________________________________________  PHYSICAL EXAMINATION:  General: well developed and well nourished, alert, oriented times 3 and appears comfortable  Psychiatric: Normal  HEENT: Trachea Midline, No torticollis  Pulmonary: No audible wheezing or strider  Cardiovascular: No discernable arrhythmia   Skin: No masses, erythema, lacerations, fluctation, ulcerations  Neurovascular: Sensation Intact to the Median, Ulnar, Radial Nerve, Motor Intact to the Median, Ulnar, Radial Nerve and Pulses Intact    MUSCULOSKELETAL EXAMINATION:  LEFT elbow:   There is no ecchymosis seen at the ante cubital space nor tracking distally proximally to the area  There is some mild tenderness over the flexor pronation mass and into the biceps belly  He has full non-tender ROM of the elbow   + Hook test  No supination strength  NVI intact  Good capillary refill    Cardiac:  Regular rate and rhythm  Pulmonary: No respiratory distress  Lung sounds clear to auscultation  ___________________________________________________  STUDIES REVIEWED:  Images obtained on 1/3/20 of the Left elbow demonstrate distal biceps tear  PROCEDURES PERFORMED:  Procedures  No Procedures performed today    _____________________________________________________  ASSESSMENT/PLAN:  Left distal biceps tear   - Left Distal biceps repair was discussed at length today including the risks and benefits   - It was explained that there is a small window in which it can be repaired     - He will lose 60% supination and 20% pronation strength along with cosmetic deformity if left unrepaired  - If he wishes to restore biceps function then left distal biceps repair is recommeded  - Patient wishes to proceed with surgical repair of distal biceps tendon   - The patient will not be able to lift anything of significant weight at least 3 months  *Surgery- Left distal biceps repair   * detailed consent was signed in the office   * anesthesia-  Local with sedation   * antibiotics- ordered   * OT order was placed   * Post op medication was sent to the office on file     Surgery medication instructions: You will stop eating and drinking at midnight the night before your surgery, but you may continue to take your normal medications with a small sip of water  In the morning on the day of your surgery, we would like you to take the following medications (as long as you have never been told to avoid Tylenol or NSAIDs like ibuprofen, Naproxen, Aleve, Advil, etc):   Ibuprofen 600mg one tablet by mouth   Tylenol 500mg one tablet by mouth    After surgery, we would like you to take Ibuprofen 600mg one tablet by mouth every 6 hours with food (at breakfast, lunch and dinner)  AND Tylenol 500 mg one tablet by mouth every 6 hours  (at breakfast, lunch and dinner) for 5-7 days after your surgery  Please take these medication EVERYDAY after surgery for 5-7 days, and not just as needed  You can take these medications at the same time  Taking these medications after surgery will limit your need for prescription pain medication  We will also prescribe a narcotic pain medication for a limited time after surgery that you can take as needed for moderate or severe pain  Diagnoses and all orders for this visit:    Rupture of left distal biceps tendon, initial encounter        Follow Up:  Return for Follow up post- op      Work/school status:  Out of work     To Do Next Visit:  Re-evaluation of current issue      Operative Discussions:  Distal Biceps Repair:  Distal biceps tendon rupture occurs as a result of forced elbow extension against active elbow flexion  The Biceps tendon ruptures from its insertion on the radial tuberosity  Rupture of this tendon results in immediate pain and discomfort that usually resolves over several days in the event of a complete rupture  However, the result is often loss of 40% in elbow flexion strength and 60% loss in the strength of supination (forearm rotation)  Most patients notice the loss forearm rotation strength  The biceps muscle may migrate proximally (franck muscle)  Non-operative treatment will not result in restoration of strength  Surgical treatment is the only means to restore anatomical length and strength  If Surgery is chosen as the treatment option, the tendon is repaired back to its anatomic location on the radius  This entails an incision in the elbow flexion crease to expose the torn tendon  A hole is drilled into the radius bone into which the tendon is placed  The tendon is anchored to the radius with a metallic anchor and suture  The specific risk for this surgical procedure is injury to the posterior interosseous nerve  Injury to this nerve can result in permanent loss of active wrist and finger extension  Although this risk is low, it is a known risk with surgical treatment in the procedure described above      Scribe Attestation    I,:   Colten Barraza am acting as a scribe while in the presence of the attending physician :        I,:   Mariajose Farris MD personally performed the services described in this documentation    as scribed in my presence :

## 2020-01-06 NOTE — PROGRESS NOTES
CHIEF COMPLAINT:  Chief Complaint   Patient presents with    Left Arm - Pain       SUBJECTIVE:  Yaniv Mark is a 38y o  year old RHD male who presents today for a surgical consultation for his left distal biceps rupture referred by Dr Nicola Lemus  Patient initially was seen in the ED on 12/25/19, after sustaining an injury when trying to lift a folding table from behind a generator when he felt a 'pop'  He was seen by Dr Nicola Lemus on 12/26/19, where an MRI of the left elbow was ordered to evaluate the partial distal biceps tear  Today he notes that he doesn't have much pain, just a fatigue feeling  He hasn't had to take any medications for the pain  He is a   The patient denies any cardiac or pulmonary issues  Denies diabetes  Denies any history of MI, gastric ulcers, + kidney stones, no liver issues  Denies blood thinners  PAST MEDICAL HISTORY:  Past Medical History:   Diagnosis Date    Asthma     childhood    Hypertension     Kidney stone        PAST SURGICAL HISTORY:  Past Surgical History:   Procedure Laterality Date    COLONOSCOPY      HEMORRHOID SURGERY      KNEE SURGERY Left     [place] CYSTO/URETERO W/LITHOTRIPSY &INDWELL STENT INSRT Right 6/8/2017    Procedure: CYSTOSCOPY; URETEROSCOPY; RETROGRADE PYELOGRAM; HOLMIUM LASER LITHOTRIPSY; BASKET STONE EXTRACTION; STENT PLACEMENT ;  Surgeon: Deepa Moss MD;  Location[facility];  Service: Urology       FAMILY HISTORY:  Family History   Problem Relation Age of Onset    No Known Problems Mother        SOCIAL HISTORY:  Social History     Tobacco Use    Smoking status: Former Smoker    Smokeless tobacco: Never Used    Tobacco comment: quit when 24 y o  Substance Use Topics    Alcohol use:  Yes     Alcohol/week: 6 0 standard drinks     Types: 6 Cans of beer per week     Comment: 6 per week    Drug use: No       MEDICATIONS:    Current Outpatient Medications:     lisinopril (ZESTRIL) 40 mg tablet, , Disp: , Rfl: ALLERGIES:  No Known Allergies    REVIEW OF SYSTEMS:  Review of Systems    VITALS:  Vitals:    01/06/20 0751   BP: 148/93   Pulse: 74       LABS:  HgA1c: No results found for: HGBA1C  BMP:   Lab Results   Component Value Date    CALCIUM 8 8 03/30/2018    K 4 1 03/30/2018    CO2 27 03/30/2018     03/30/2018    BUN 15 03/30/2018    CREATININE 0 87 03/30/2018       _____________________________________________________  PHYSICAL EXAMINATION:  General: well developed and well nourished, alert, oriented times 3 and appears comfortable  Psychiatric: Normal  HEENT: Trachea Midline, No torticollis  Pulmonary: No audible wheezing or strider  Cardiovascular: No discernable arrhythmia   Skin: No masses, erythema, lacerations, fluctation, ulcerations  Neurovascular: Sensation Intact to the Median, Ulnar, Radial Nerve, Motor Intact to the Median, Ulnar, Radial Nerve and Pulses Intact    MUSCULOSKELETAL EXAMINATION:  LEFT elbow:   There is no ecchymosis seen at the ante cubital space nor tracking distally proximally to the area  There is some mild tenderness over the flexor pronation mass and into the biceps belly  He has full non-tender ROM of the elbow   + Hook test  No supination strength  NVI intact  Good capillary refill    Cardiac:  Regular rate and rhythm  Pulmonary: No respiratory distress  Lung sounds clear to auscultation  ___________________________________________________  STUDIES REVIEWED:  Images obtained on 1/3/20 of the Left elbow demonstrate distal biceps tear  PROCEDURES PERFORMED:  Procedures  No Procedures performed today    _____________________________________________________  ASSESSMENT/PLAN:  Left distal biceps tear   - Left Distal biceps repair was discussed at length today including the risks and benefits   - It was explained that there is a small window in which it can be repaired     - He will lose 60% supination and 20% pronation strength along with cosmetic deformity if left unrepaired  - If he wishes to restore biceps function then left distal biceps repair is recommeded  - Patient wishes to proceed with surgical repair of distal biceps tendon   - The patient will not be able to lift anything of significant weight at least 3 months  *Surgery- Left distal biceps repair   * detailed consent was signed in the office   * anesthesia-  Local with sedation   * antibiotics- ordered   * OT order was placed   * Post op medication was sent to the office on file     Surgery medication instructions: You will stop eating and drinking at midnight the night before your surgery, but you may continue to take your normal medications with a small sip of water  In the morning on the day of your surgery, we would like you to take the following medications (as long as you have never been told to avoid Tylenol or NSAIDs like ibuprofen, Naproxen, Aleve, Advil, etc):   Ibuprofen 600mg one tablet by mouth   Tylenol 500mg one tablet by mouth    After surgery, we would like you to take Ibuprofen 600mg one tablet by mouth every 6 hours with food (at breakfast, lunch and dinner)  AND Tylenol 500 mg one tablet by mouth every 6 hours  (at breakfast, lunch and dinner) for 5-7 days after your surgery  Please take these medication EVERYDAY after surgery for 5-7 days, and not just as needed  You can take these medications at the same time  Taking these medications after surgery will limit your need for prescription pain medication  We will also prescribe a narcotic pain medication for a limited time after surgery that you can take as needed for moderate or severe pain  Diagnoses and all orders for this visit:    Rupture of left distal biceps tendon, initial encounter        Follow Up:  Return for Follow up post- op      Work/school status:  Out of work     To Do Next Visit:  Re-evaluation of current issue      Operative Discussions:  Distal Biceps Repair:  Distal biceps tendon rupture occurs as a result of forced elbow extension against active elbow flexion  The Biceps tendon ruptures from its insertion on the radial tuberosity  Rupture of this tendon results in immediate pain and discomfort that usually resolves over several days in the event of a complete rupture  However, the result is often loss of 40% in elbow flexion strength and 60% loss in the strength of supination (forearm rotation)  Most patients notice the loss forearm rotation strength  The biceps muscle may migrate proximally (franck muscle)  Non-operative treatment will not result in restoration of strength  Surgical treatment is the only means to restore anatomical length and strength  If Surgery is chosen as the treatment option, the tendon is repaired back to its anatomic location on the radius  This entails an incision in the elbow flexion crease to expose the torn tendon  A hole is drilled into the radius bone into which the tendon is placed  The tendon is anchored to the radius with a metallic anchor and suture  The specific risk for this surgical procedure is injury to the posterior interosseous nerve  Injury to this nerve can result in permanent loss of active wrist and finger extension  Although this risk is low, it is a known risk with surgical treatment in the procedure described above      Scribe Attestation    I,:   Mervin Pena am acting as a scribe while in the presence of the attending physician :        I,:   Josefina Talley MD personally performed the services described in this documentation    as scribed in my presence :

## 2020-01-07 DIAGNOSIS — Z91.89 RISK FACTORS FOR OBSTRUCTIVE SLEEP APNEA: Primary | ICD-10-CM

## 2020-01-08 ENCOUNTER — APPOINTMENT (OUTPATIENT)
Dept: RADIOLOGY | Facility: HOSPITAL | Age: 45
End: 2020-01-08
Payer: COMMERCIAL

## 2020-01-08 ENCOUNTER — HOSPITAL ENCOUNTER (OUTPATIENT)
Facility: HOSPITAL | Age: 45
Setting detail: OUTPATIENT SURGERY
Discharge: HOME | End: 2020-01-08
Attending: ORTHOPAEDIC SURGERY | Admitting: ORTHOPAEDIC SURGERY
Payer: COMMERCIAL

## 2020-01-08 ENCOUNTER — ANESTHESIA (OUTPATIENT)
Dept: PERIOP | Facility: HOSPITAL | Age: 45
End: 2020-01-08
Payer: COMMERCIAL

## 2020-01-08 ENCOUNTER — ANESTHESIA EVENT (OUTPATIENT)
Dept: PERIOP | Facility: HOSPITAL | Age: 45
End: 2020-01-08
Payer: COMMERCIAL

## 2020-01-08 VITALS
OXYGEN SATURATION: 94 % | HEIGHT: 70 IN | WEIGHT: 242 LBS | SYSTOLIC BLOOD PRESSURE: 128 MMHG | BODY MASS INDEX: 34.65 KG/M2 | RESPIRATION RATE: 25 BRPM | DIASTOLIC BLOOD PRESSURE: 69 MMHG | TEMPERATURE: 97.9 F | HEART RATE: 88 BPM

## 2020-01-08 PROCEDURE — 73070 X-RAY EXAM OF ELBOW: CPT

## 2020-01-08 PROCEDURE — 24342 REPAIR OF RUPTURED TENDON: CPT | Performed by: PHYSICIAN ASSISTANT

## 2020-01-08 PROCEDURE — 93005 ELECTROCARDIOGRAM TRACING: CPT

## 2020-01-08 PROCEDURE — 24342 REPAIR OF RUPTURED TENDON: CPT | Performed by: ORTHOPAEDIC SURGERY

## 2020-01-08 PROCEDURE — 71045 X-RAY EXAM CHEST 1 VIEW: CPT

## 2020-01-08 DEVICE — ENDOBUTTON PAC LTX FREE
Type: IMPLANTABLE DEVICE | Site: ARM | Status: FUNCTIONAL
Brand: ENDOBUTTON

## 2020-01-08 RX ORDER — ACETAMINOPHEN 325 MG/1
650 TABLET ORAL EVERY 6 HOURS PRN
Status: DISCONTINUED | OUTPATIENT
Start: 2020-01-08 | End: 2020-01-08 | Stop reason: HOSPADM

## 2020-01-08 RX ORDER — ONDANSETRON 2 MG/ML
4 INJECTION INTRAMUSCULAR; INTRAVENOUS ONCE AS NEEDED
Status: DISCONTINUED | OUTPATIENT
Start: 2020-01-08 | End: 2020-01-08

## 2020-01-08 RX ORDER — HYDROMORPHONE HCL/PF 1 MG/ML
0.4 SYRINGE (ML) INJECTION
Status: DISCONTINUED | OUTPATIENT
Start: 2020-01-08 | End: 2020-01-08

## 2020-01-08 RX ORDER — FUROSEMIDE 10 MG/ML
10 INJECTION INTRAMUSCULAR; INTRAVENOUS ONCE
Status: COMPLETED | OUTPATIENT
Start: 2020-01-08 | End: 2020-01-08

## 2020-01-08 RX ORDER — CEFAZOLIN SODIUM 2 G/50ML
2000 SOLUTION INTRAVENOUS ONCE
Status: DISCONTINUED | OUTPATIENT
Start: 2020-01-08 | End: 2020-01-08

## 2020-01-08 RX ORDER — PROMETHAZINE HYDROCHLORIDE 25 MG/ML
12.5 INJECTION, SOLUTION INTRAMUSCULAR; INTRAVENOUS ONCE AS NEEDED
Status: DISCONTINUED | OUTPATIENT
Start: 2020-01-08 | End: 2020-01-08

## 2020-01-08 RX ORDER — ALBUTEROL SULFATE 2.5 MG/3ML
2.5 SOLUTION RESPIRATORY (INHALATION) ONCE AS NEEDED
Status: DISCONTINUED | OUTPATIENT
Start: 2020-01-08 | End: 2020-01-08

## 2020-01-08 RX ORDER — MAGNESIUM HYDROXIDE 1200 MG/15ML
LIQUID ORAL AS NEEDED
Status: DISCONTINUED | OUTPATIENT
Start: 2020-01-08 | End: 2020-01-08 | Stop reason: HOSPADM

## 2020-01-08 RX ORDER — PROPOFOL 10 MG/ML
INJECTION, EMULSION INTRAVENOUS AS NEEDED
Status: DISCONTINUED | OUTPATIENT
Start: 2020-01-08 | End: 2020-01-08 | Stop reason: SURG

## 2020-01-08 RX ORDER — SODIUM CHLORIDE, SODIUM LACTATE, POTASSIUM CHLORIDE, CALCIUM CHLORIDE 600; 310; 30; 20 MG/100ML; MG/100ML; MG/100ML; MG/100ML
INJECTION, SOLUTION INTRAVENOUS CONTINUOUS PRN
Status: DISCONTINUED | OUTPATIENT
Start: 2020-01-08 | End: 2020-01-08 | Stop reason: SURG

## 2020-01-08 RX ORDER — FENTANYL CITRATE 50 UG/ML
INJECTION, SOLUTION INTRAMUSCULAR; INTRAVENOUS AS NEEDED
Status: DISCONTINUED | OUTPATIENT
Start: 2020-01-08 | End: 2020-01-08 | Stop reason: SURG

## 2020-01-08 RX ORDER — PROPOFOL 10 MG/ML
INJECTION, EMULSION INTRAVENOUS CONTINUOUS PRN
Status: DISCONTINUED | OUTPATIENT
Start: 2020-01-08 | End: 2020-01-08 | Stop reason: SURG

## 2020-01-08 RX ORDER — KETAMINE HCL IN NACL, ISO-OSM 100MG/10ML
SYRINGE (ML) INJECTION AS NEEDED
Status: DISCONTINUED | OUTPATIENT
Start: 2020-01-08 | End: 2020-01-08 | Stop reason: SURG

## 2020-01-08 RX ORDER — METOCLOPRAMIDE HYDROCHLORIDE 5 MG/ML
10 INJECTION INTRAMUSCULAR; INTRAVENOUS ONCE AS NEEDED
Status: DISCONTINUED | OUTPATIENT
Start: 2020-01-08 | End: 2020-01-08

## 2020-01-08 RX ORDER — LIDOCAINE HYDROCHLORIDE 10 MG/ML
INJECTION, SOLUTION EPIDURAL; INFILTRATION; INTRACAUDAL; PERINEURAL AS NEEDED
Status: DISCONTINUED | OUTPATIENT
Start: 2020-01-08 | End: 2020-01-08 | Stop reason: SURG

## 2020-01-08 RX ORDER — GLYCOPYRROLATE 0.2 MG/ML
INJECTION INTRAMUSCULAR; INTRAVENOUS AS NEEDED
Status: DISCONTINUED | OUTPATIENT
Start: 2020-01-08 | End: 2020-01-08 | Stop reason: SURG

## 2020-01-08 RX ORDER — FENTANYL CITRATE/PF 50 MCG/ML
50 SYRINGE (ML) INJECTION
Status: DISCONTINUED | OUTPATIENT
Start: 2020-01-08 | End: 2020-01-08

## 2020-01-08 RX ORDER — ROPIVACAINE HYDROCHLORIDE 5 MG/ML
INJECTION, SOLUTION EPIDURAL; INFILTRATION; PERINEURAL
Status: COMPLETED | OUTPATIENT
Start: 2020-01-08 | End: 2020-01-08

## 2020-01-08 RX ORDER — TRAMADOL HYDROCHLORIDE 50 MG/1
50 TABLET ORAL EVERY 6 HOURS PRN
Status: DISCONTINUED | OUTPATIENT
Start: 2020-01-08 | End: 2020-01-08 | Stop reason: HOSPADM

## 2020-01-08 RX ORDER — SODIUM CHLORIDE, SODIUM LACTATE, POTASSIUM CHLORIDE, CALCIUM CHLORIDE 600; 310; 30; 20 MG/100ML; MG/100ML; MG/100ML; MG/100ML
50 INJECTION, SOLUTION INTRAVENOUS CONTINUOUS
Status: DISCONTINUED | OUTPATIENT
Start: 2020-01-08 | End: 2020-01-08 | Stop reason: HOSPADM

## 2020-01-08 RX ORDER — ONDANSETRON 2 MG/ML
4 INJECTION INTRAMUSCULAR; INTRAVENOUS EVERY 6 HOURS PRN
Status: DISCONTINUED | OUTPATIENT
Start: 2020-01-08 | End: 2020-01-08 | Stop reason: HOSPADM

## 2020-01-08 RX ADMIN — PROPOFOL 50 MG: 10 INJECTION, EMULSION INTRAVENOUS at 13:20

## 2020-01-08 RX ADMIN — SODIUM CHLORIDE, SODIUM LACTATE, POTASSIUM CHLORIDE, AND CALCIUM CHLORIDE: .6; .31; .03; .02 INJECTION, SOLUTION INTRAVENOUS at 10:13

## 2020-01-08 RX ADMIN — LIDOCAINE HYDROCHLORIDE 50 MG: 10 INJECTION, SOLUTION EPIDURAL; INFILTRATION; INTRACAUDAL; PERINEURAL at 13:17

## 2020-01-08 RX ADMIN — PROPOFOL 100 MCG/KG/MIN: 10 INJECTION, EMULSION INTRAVENOUS at 13:18

## 2020-01-08 RX ADMIN — SODIUM CHLORIDE, SODIUM LACTATE, POTASSIUM CHLORIDE, AND CALCIUM CHLORIDE: .6; .31; .03; .02 INJECTION, SOLUTION INTRAVENOUS at 14:28

## 2020-01-08 RX ADMIN — PHENYLEPHRINE HYDROCHLORIDE 100 MCG: 10 INJECTION INTRAVENOUS at 13:54

## 2020-01-08 RX ADMIN — FENTANYL CITRATE 25 MCG: 50 INJECTION, SOLUTION INTRAMUSCULAR; INTRAVENOUS at 13:44

## 2020-01-08 RX ADMIN — PROPOFOL 100 MG: 10 INJECTION, EMULSION INTRAVENOUS at 13:17

## 2020-01-08 RX ADMIN — PROPOFOL 50 MG: 10 INJECTION, EMULSION INTRAVENOUS at 13:28

## 2020-01-08 RX ADMIN — Medication 20 MG: at 13:27

## 2020-01-08 RX ADMIN — GLYCOPYRROLATE 0.1 MG: 0.2 INJECTION, SOLUTION INTRAMUSCULAR; INTRAVENOUS at 13:27

## 2020-01-08 RX ADMIN — FENTANYL CITRATE 50 MCG: 50 INJECTION, SOLUTION INTRAMUSCULAR; INTRAVENOUS at 13:26

## 2020-01-08 RX ADMIN — Medication 10 MG: at 14:17

## 2020-01-08 RX ADMIN — FENTANYL CITRATE 25 MCG: 50 INJECTION, SOLUTION INTRAMUSCULAR; INTRAVENOUS at 14:14

## 2020-01-08 RX ADMIN — FUROSEMIDE 10 MG: 10 INJECTION, SOLUTION INTRAVENOUS at 16:42

## 2020-01-08 RX ADMIN — TRAMADOL HYDROCHLORIDE 50 MG: 50 TABLET, FILM COATED ORAL at 16:17

## 2020-01-08 RX ADMIN — ACETAMINOPHEN 650 MG: 325 TABLET, FILM COATED ORAL at 16:16

## 2020-01-08 RX ADMIN — ROPIVACAINE HYDROCHLORIDE 25 ML: 5 INJECTION, SOLUTION EPIDURAL; INFILTRATION; PERINEURAL at 11:03

## 2020-01-08 NOTE — ANESTHESIA PREPROCEDURE EVALUATION
Review of Systems/Medical History  Patient summary reviewed  Chart reviewed  No history of anesthetic complications     Cardiovascular  EKG reviewed, Hypertension ,   Comment: Normal sinus rhythm  Confirmed by JEREMY CLEMENTE MD ([id number]) on 6/2/2017 3:29:26 PM,  Pulmonary  Smoker ex-smoker  , Asthma ,        GI/Hepatic       Kidney stones,        Endo/Other     GYN       Hematology   Musculoskeletal    Comment: Rupture of left distal biceps tendon      Neurology   Psychology           Physical Exam    Airway    Mallampati score: II  TM Distance: >3 FB  Neck ROM: full     Dental   No notable dental hx     Cardiovascular  Cardiovascular exam normal    Pulmonary  Pulmonary exam normal Breath sounds clear to auscultation,     Other Findings    PSH:    KNEE SURGERY HEMORRHOID SURGERY   COLONOSCOPY [place] CYSTO/URETERO W/LITHOTRIPSY &INDWELL STENT INSRT         Anesthesia Plan  ASA Score- 2     Anesthesia Type- IV sedation with anesthesia and regional with ASA Monitors  Additional Monitors:   Airway Plan:     Comment: Supraclavicular block with IV sedation  Plan Factors- Patient instructed to abstain from smoking on day of procedure       Induction-     Postoperative Plan-     Informed Consent- Anesthetic plan and risks discussed with patient  I personally reviewed this patient with the CRNA  Discussed and agreed on the Anesthesia Plan with the CRNA           Lab Results   Component Value Date    WBC 9 85 01/06/2020    HGB 16 3 01/06/2020    HCT 49 7 (H) 01/06/2020    MCV 89 01/06/2020     01/06/2020     Lab Results   Component Value Date    CALCIUM 9 6 01/06/2020    K 4 3 01/06/2020    CO2 27 01/06/2020     01/06/2020    BUN 11 01/06/2020    CREATININE 0 71 01/06/2020     Lab Results   Component Value Date    INR 0 92 06/02/2017    PROTIME 12 6 06/02/2017     Lab Results   Component Value Date    PTT 27 06/02/2017             Discussed with pt the benefits/alternatives and risks or General Anesthesia including breathing tube remaining in place if not strong enough, PONV, damage to lips and teeth, sore throat, eye injury or blindness    I, Dr Clarice Ramirez, the attending physician, have personally seen and evaluated the patient prior to anesthetic care  I have reviewed the pre-anesthetic record, and other medical records if appropriate to the anesthetic care  If a CRNA is involved in the case, I have reviewed the CRNA assessment, if present, and agree  The patient is in a suitable condition to proceed with my formulated anesthetic plan

## 2020-01-08 NOTE — INTERVAL H&P NOTE
H&P reviewed  After examining the patient I find no changes in the patients condition since the H&P had been written      Vitals:    01/08/20 1008   BP: 140/91   Pulse: 82   Resp: 20   Temp: 98 4 °F (36 9 °C)   SpO2: 94%

## 2020-01-08 NOTE — ANESTHESIA POSTPROCEDURE EVALUATION
Post-Op Assessment Note    CV Status:  Stable  Pain Score: 0    Pain management: adequate     Mental Status:  Awake and alert   Hydration Status:  Stable   PONV Controlled:  Controlled   Airway Patency:  Patent   Post Op Vitals Reviewed: Yes      Staff: CRNA           BP   132/87   Temp      Pulse  99   Resp   14   SpO2   96     Asked if OK, patient gave thumbs up

## 2020-01-08 NOTE — ANESTHESIA PROCEDURE NOTES
Peripheral Block    Patient location during procedure: pre-op  Start time: 1/8/2020 10:58 AM  Reason for block: procedure for pain, at surgeon's request and post-op pain management  Staffing  Anesthesiologist: Cele James MD  Performed: anesthesiologist   Preanesthetic Checklist  Completed: patient identified, site marked, surgical consent, pre-op evaluation, timeout performed, IV checked, risks and benefits discussed and monitors and equipment checked  Peripheral Block  Prep: ChloraPrep  Patient monitoring: heart rate, cardiac monitor, continuous pulse ox and frequent blood pressure checks  Block type: supraclavicular  Laterality: left  Injection technique: single-shot  Procedures: ultrasound guided, Ultrasound guidance required for the procedure to increase accuracy and safety of medication placement and decrease risk of complications    Ultrasound permanent image savedropivacaine (NAROPIN) 0 5 % perineural infiltration, 25 mL  Needle  Needle type: Stimuplex   Needle gauge: 22 G  Needle length: 10 cm  Needle localization: ultrasound guidance  Needle insertion depth: 3 cm  Test dose: negative  Assessment  Injection assessment: incremental injection, local visualized surrounding nerve on ultrasound, negative aspiration for heme and transient paresthesias  Paresthesia pain: immediately resolved  Heart rate change: no  Slow fractionated injection: yes  Post-procedure:  site cleaned  patient tolerated the procedure well with no immediate complications

## 2020-01-08 NOTE — DISCHARGE INSTRUCTIONS
Post Operative Instructions    You have had surgery on your arm today, please read and follow the information below:  · Elevate your hand above your elbow during the next 24-48 hours to help with swelling  · Place your hand and arm over your head with motion at your shoulder three times a day  · Do not apply any cream/ointment/oil to your incisions including antibiotics  · Do not soak your hands in standing water (dishwater, tubs, Jacuzzi's, pools, etc ) until given permission (typically 2-3 weeks after injury)    Call the office at [phone]  if you notice any:  · Increased numbness or tingling of your hand or fingers that is not relieved with elevation  · Increasing pain that is not controlled with medication  · Difficulty chewing, breathing, swallowing  · Chest pains or shortness of breath  · Fever over 101 4 degrees  Bandage: Your therapist will remove your bandage at your first therapy appointment  Motion: Move fingers into a fist 5 times a day, DO NOT move any splinted fingers  Weight bearing status: The operated extremity should be non-weight bearing until further notice  Ice: Ice for 10 minutes every hour as needed for swelling x 24 hours  Sling: Sling for comfort for 2-3 days, or until pain block wears off  Pain medication: A prescription for pain medication was provided in the office and sent to your pharmacy  Your prescription pain medication also contains Tylenol  Please limit total Tylenol dose to under 3,000 mg a day  After surgery, we would like you to take Ibuprofen 600mg one tablet by mouth every 6 hours with food (at breakfast, lunch and dinner)  AND Tylenol 500 mg one tablet by mouth every 6 hours  (at breakfast, lunch and dinner) for 5-7 days after your surgery  Please take these medication EVERYDAY after surgery for 5-7 days, and not just as needed  You can take these medications at the same time    Taking these medications after surgery will limit your need for prescription pain medication  If the pain becomes severe, and the pain medication is not alleviating symptoms, The greatest source of pain after surgery is usually a tight dressing due to increased swelling after surgery  If the pain becomes severe after surgery, and the patient medication is NOT alleviating the symptoms, the patient should do the following: The patient should  loosen the top dressing (usually coban or an ace bandage) and loosen/cut the rolled gauze beneath  The 4x4 gauze that is directly covering the incision should remain in place  The splint (if the patient has one) should remain in place  The ace bandage/coban can then be replaced on top in a less constrictive manor  If this does not help relieve the pain/numbness in a few hours, the patient should call our office (number listed below)  and we can have them seen in the office for further evaluation  Follow-up Appointment: 7-10 days with Dr Ángel Montanez  Occupational Therapy: 1/10/2020  [address] [place] [address], the patient may remove the splint/dressing for showering and clean the incision with soap and water  Keep incision dry after washing  Do not expose the incision to dirty water (oceans, pools, hot tubs, etc)     If you need help scheduling Therapy, you can call [phone]        Please call the office at [phone] if you have any questions or concerns regarding your post-operative care

## 2020-01-08 NOTE — PERIOPERATIVE NURSING NOTE
1605- Patient complaining of worsening chest tightness/pressure  VSS  Dr Abraham Adrian made aware and at bedside  EKG and CXR performed as ordered  Incentive spirometer provided to patient  Will continue to monitor

## 2020-01-08 NOTE — OP NOTE
OPERATIVE REPORT    PATIENT NAME: Alem Dey    MEDICAL RECORD NO:  [id number]    PROCEDURE DATE:  20    :  1975    SURGEON:  AGA Rosales , Ph D     Bruna Muss:  Justina Nichols    No qualified resident was available to assist for this surgery   A Physician Assistant was used to aid in reduction and retraction while the orthopedic hardware was placed      PREOPERATIVE DIAGNOSIS:  left distal biceps tendon rupture     POSTOPERATIVE DIAGNOSIS: left distal biceps tendon rupture     PROCEDURE PERFORMED: left distal biceps tendon repair    EQUIPMENT:   Endobutton    ANESTHESIA:  Regional and conscious sedation    COMPLICATIONS:  none    TOURNIQUET TIME:  61 minutes at 250 mmHg     DISPOSITION: Patient was sent to the PACU in stable condition  INDICATIONS: Patient is a 38 y o  male who suffered a left distal biceps tendon rupture with MRI confirmation  The patient was counseled on risks and benefits of surgical versus nonsurgical treatment  The patient opted for surgical repair  PROCEDURE:  The patient was identified in the preoperative screening area  Consent was signed and verified after identifying the correct operative site  After receiving regional anesthesia in the pre-operative holding area, the patient was taken back to the operating room where general anesthesia was induced  The patients left upper extremity was then prepped and draped in normal sterile fashion with chlorhexidine solution  The arm was then elevated and exsanguinated with an Esmarch and tourniquet placed about the brachium was insufflated to 250 mmHg  The Esmarch was removed  A 4 cm transverse incision was made in the antecubital fossa  The soft tissue was dissected bluntly  The cephalic vein and the lateral antebrachial cutaneous nerve were identified and protected during the case  The biceps tendon was identified distal to the antecubital fossa crease   It was easily identified and freed from fibrous adhesions  The distal end of the biceps tendon was then sharply transected to freshen the tendon edge  A #2 fiber wire suture was then passed twice in a Sims type stitch through the tendon securing an Endobutton anchor to the distal end of the biceps tendon  With the suture and Endobutton in place, the diameter of the distal tendon was determined to be 8 mm using the ACL sizing guide  The bicipital tuberosity was identified in the depth of the wound with Baby Hohmann retractors placed on either side of the tuberosity to enhance the view  A guidewire was then placed into the biceps tuberosity with the forearm fully supinated  Guidewire placement was verified with intra-operative flouro imaging  Once proper placement was confirmed, the anterior cortex of the biceps tuberosity was then opened using a size 8 mm cannulated drill placed over the guidewire drilling to, but not through, the posterior cortex  The posterior cortex was opened using a 4 mm cannulated drill placed over the guidewire  Intra-osseous placement of the biceps tendon was next achieved  Zero Ethibond sutures of differing color were looped into the two toggling holes of the Endobutton  The ends of the suture were placed through the eyelets of a long Mukesh needle  The Augusta needle was placed through the tuberosity hole and out the dorsal skin surface  With the Augusta needle removed, the distal biceps tendon was then brought into the tuberosity by placing gentle traction on the sutures  Once the tendon was well seated within the tuberosity, the Endobutton was brought through the dorsal cortex and toggled into position by manipulating the toggling sutures  The tendon was then evaluated and found to be securely anchored within the bicipital tuberosity  Intra-operative flouro imaging revealed that the anchor was lying properly against the dorsal cortex of the biceps tuberosity  Once confirmed, the toggling sutures were then removed  Intra-operative motion of the elbow revealed passive extension to full  The wound was irrigated with copious amounts of saline solution  The skin was then secured with 4-0 nylon suture  A compressive dressing was placed around the elbow and the elbow was then placed in a hinged elbow brace locked at 90°  The patient tolerated the procedure well  The tourniquet was released at approximately 61 minutes with good capillary and color in the distal digits  The patient was awakened in the operating room and sent to the PACU in stable condition  As no first assistant was provided by the hospital, it was elected to use a physician's assistant to stabilize the extremity and aid in instrumentation  IROM elbow brace was dispensed to the patient today       Chiqui Rodriguez MD 01/08/20 2:39 PM

## 2020-01-09 LAB
ATRIAL RATE: 82 BPM
P AXIS: 60 DEGREES
PR INTERVAL: 172 MS
QRS AXIS: 66 DEGREES
QRSD INTERVAL: 88 MS
QT INTERVAL: 358 MS
QTC INTERVAL: 418 MS
T WAVE AXIS: 25 DEGREES
VENTRICULAR RATE: 82 BPM

## 2020-01-09 PROCEDURE — 93010 ELECTROCARDIOGRAM REPORT: CPT | Performed by: INTERNAL MEDICINE

## 2020-01-10 ENCOUNTER — EVALUATION (OUTPATIENT)
Dept: OCCUPATIONAL THERAPY | Facility: CLINIC | Age: 45
End: 2020-01-10
Payer: COMMERCIAL

## 2020-01-10 DIAGNOSIS — S46.212D RUPTURE OF LEFT DISTAL BICEPS TENDON, SUBSEQUENT ENCOUNTER: ICD-10-CM

## 2020-01-10 PROCEDURE — 97165 OT EVAL LOW COMPLEX 30 MIN: CPT

## 2020-01-10 PROCEDURE — 97110 THERAPEUTIC EXERCISES: CPT

## 2020-01-10 NOTE — PROGRESS NOTES
OT Evaluation     Today's date: 1/10/2020  Patient name: Kera Riojas  : 1975  MRN: [id number]  Referring provider: Brianda Ramirez MD  Dx:   Encounter Diagnosis     ICD-10-CM    1  Rupture of left distal biceps tendon, subsequent encounter S46 212D Ambulatory referral to Occupational Therapy                  Assessment  Assessment details: This is a 38year old, right hand dominant male being seen following a repair of the left distal biceps on 19  Patient presents this date in a hinged elbow brace with extension limited to -70 degrees  Patient has a small amount of bleeding from the incision when the post op dressing was changed  He has edema at the elbow and hand and his biceps is atrophied  The bicep repair is intact  Patient has moderate pain and severe limitations in AROM of the left elbow and forearm  AROM of the wrist and hand is WNL  Patient educated about AROM program with no elbow extension beyond -30 degrees  He was also educated on elevation and wound care  He was instructed to wear splint at all times except to change his dressings and wash his arm  Splint will need to be repositioned often to prevent distal migration of the splint  This patient is a good candidate for OT services to restore LUE function to return to work  Impairments: abnormal or restricted ROM, activity intolerance, impaired physical strength, lacks appropriate home exercise program, pain with function and weight-bearing intolerance  Other impairment: Healing wound  Edema in left elbow and hand  Functional limitations: No use of the LUE for functional activitiesBarriers to therapy: Limited number of therapy visits  Pain  Understanding of Dx/Px/POC: excellent  Goals  STGs ( 4 weeks)  1  Patient will be independent in a [facility] for wound care, splint wear, and ROM of the elbow and forearm following protocol  2  Patient will demonstrate full wound healing  3    Patient will demonstrate resolution of edema in left hand  4  Patient will report a 1-2 level decrease in LUE pain  5  Patient will demonstrate 45/45 forearm pronation and supination  6  Patient will demonstrate AROM left elbow to -30/130  LTGS ( 12 weeks)  1  Patient will be independent in a HEP to maintain LUE strength and ROM at discharge  2  Patient will report a maximum pain level of 2/10 in the LUE during daily activities  3  Patient will demonstrate AROM left elbow to 0/140 and forearm roation to WNL to be independent in self care tasks  4  Patient will demonstrate left elbow, forearm, wrist strength to 5/5 to prepare to return to work  5  Patient will demonstrate left hand strength within 30% of the right hand to prepare to return to work    Plan  Plan details: 1/10/20:   Begin OT following distal bicep repair protocol  Patient would benefit from: orthotics, OT eval and skilled occupational therapy  Planned modality interventions: thermotherapy: hydrocollator packs, cryotherapy and ultrasound  Planned therapy interventions: activity modification, compression, dressing changes, graded activity, graded exercise, home exercise program, therapeutic exercise, therapeutic activities, stretching, patient education, orthotic management and training, neuromuscular re-education and manual therapy  Other planned therapy interventions: Wound care  Edema and scar mgt  Monitor hinged elbow brace  Frequency: 1-2x week  Duration in weeks: 12  Plan of Care beginning date: 1/10/2020  Plan of Care expiration date: 3/13/2020  Treatment plan discussed with: family and patient        Subjective Evaluation    History of Present Illness  Date of onset: 12/25/2019  Date of surgery: 1/8/2020  Mechanism of injury: surgery and trauma  Mechanism of injury: Patient reports that he was lifting a table on [place] day and felt a pop and severe pain in his left elbow and upper arm    He went to the ED that date and diagnosed with a distal biceps tendon rupture and referred to Orthopedics  Patient has a biceps repair surgery with endo button on 20 and now presents for OT evaluation in post op dressing and hinged elbow brace  Not a recurrent problem   Quality of life: good    Pain  Current pain ratin  At best pain ratin  At worst pain ratin  Location: Biceps and volar elbow and forearm  Quality: throbbing and sharp  Relieving factors: ice and medications (elevation)  Exacerbated by: hand and forearm motion  Progression: no change    Social Support  Lives with: spouse and young children    Employment status: working (  On FMLA  Needs to be able to lift 50 lbs)  Hand dominance: right      Diagnostic Tests  MRI studies: abnormal  Treatments  No previous or current treatments  Patient Goals  Patient goals for therapy: decreased edema, decreased pain, increased motion, increased strength, return to work and independence with ADLs/IADLs  Patient goal: be able to hold daughter        Objective     Observations     Left Elbow   Postive for atrophy, drainage, edema and incision  Left Wrist/Hand   Positive for edema  Additional Observation Details  1/10/20: Minimal bloood drainage when post op dressing removed  Sutures intact  Tendon repair is intact  Atrophy in biceps, Edema at elbow  and 1/10/20:  Edema dorsum of left hand    Neurological Testing     Sensation     Elbow   Left Elbow   Inact: static two point discrimination    Comments   Left static two point discrimination: Intact to 6 mm all digits       Wrist/Hand   Left   Intact: light touch    Active Range of Motion     Left Elbow   Flexion: 70 degrees with pain  Extension: 48 degrees with pain  Forearm supination: 18 degrees with pain  Forearm pronation: 25 degrees with pain    Left Wrist   Wrist flexion: WFL  Wrist extension: 40 degrees   Radial deviation: WFL  Ulnar deviation: WFL      Left Thumb   Opposition: 1/10/20:  AROM thumb is WNL    Additional Active Range of Motion Details  1/10/20:  WNL except small deficit in wrist extension  1/10/20:  AROM digits 2-5 is WNL             Precautions:  Distal bicep repair 1/8/20  Follow protocol  Wound                  Manual  1/10       Dressing change Adaptic, 4x4, aldo grullon       Edema massage                                    Exercise Diary  1/10       PT ed Repair precations:  No lifting or elbow ext >-30       HEP AROM digits, wrist, forearm, elbow, shldr       Fist pumps x10       Wrist circles x10       A/AAROM foream x10  W/ elbow 90       A/AAROM elbow -65/110 x 10                                                                                                                           Modalities

## 2020-01-11 NOTE — QUICK NOTE
Pt s/p CXR with evidence of atelectasis  Given incentive spirometer with significant improvement in subjective chest tightness  EKG is NSR  Pt encouraged to cough and deep breath, and continue IS use  Explained ipsilateal phrenic nerve blockade as a consequence of supraclavicular block  VSS  No complaints of pain      Clarice Ramirez MD

## 2020-01-13 ENCOUNTER — OFFICE VISIT (OUTPATIENT)
Dept: OCCUPATIONAL THERAPY | Facility: CLINIC | Age: 45
End: 2020-01-13
Payer: COMMERCIAL

## 2020-01-13 DIAGNOSIS — S46.212D RUPTURE OF LEFT DISTAL BICEPS TENDON, SUBSEQUENT ENCOUNTER: Primary | ICD-10-CM

## 2020-01-13 PROCEDURE — 97140 MANUAL THERAPY 1/> REGIONS: CPT

## 2020-01-13 PROCEDURE — 97110 THERAPEUTIC EXERCISES: CPT

## 2020-01-13 NOTE — PROGRESS NOTES
Daily Note     Today's date: 2020  Patient name: Po Sheldon  : 1975  MRN: [id number]  Referring provider: Donald Beard MD  Dx:   Encounter Diagnosis     ICD-10-CM    1  Rupture of left distal biceps tendon, subsequent encounter S46 681D                   Subjective: I still have trouble sleeping  I can't get comfortable      Objective: See treatment diary below  AROM elbow -40/115  AROM forearm pron/sup 45/45  Assessment: Tolerated treatment well  Patient exhibited good technique with therapeutic exercises and would benefit from continued OT  Patient is compliant with HEP  He is less apprehensive than on IE  Good improvement in AROM  Edema in hand has resolved  Wound healing well with only trace blood drainage on dressing  Bicep repair is intact  Plan: Continue per plan of care  Precautions:  Distal bicep repair 20  Follow protocol  Wound  Manual  1/10 1/13      Dressing change Adaptic, 4x4, mitchel, stockinette Same as previous session      Edema massage  10'                                  Exercise Diary  1/10 1/13      PT ed Repair precations:  No lifting or elbow ext >-30 Donning, doffing splint    Reposition when it slips      HEP AROM digits, wrist, forearm, elbow, shldr reviewed      Fist pumps x10 x20      Wrist circles x10 x20      A/AAROM foream x10  W/ elbow 90 10''      A/AAROM elbow -65/110 x 10 10''                                                                                                                          Modalities        MHP  10' pre tx

## 2020-01-20 ENCOUNTER — APPOINTMENT (OUTPATIENT)
Dept: RADIOLOGY | Facility: MEDICAL CENTER | Age: 45
End: 2020-01-20
Payer: COMMERCIAL

## 2020-01-20 ENCOUNTER — OFFICE VISIT (OUTPATIENT)
Dept: OBGYN CLINIC | Facility: MEDICAL CENTER | Age: 45
End: 2020-01-20

## 2020-01-20 ENCOUNTER — OFFICE VISIT (OUTPATIENT)
Dept: OCCUPATIONAL THERAPY | Facility: CLINIC | Age: 45
End: 2020-01-20
Payer: COMMERCIAL

## 2020-01-20 ENCOUNTER — TELEPHONE (OUTPATIENT)
Dept: OBGYN CLINIC | Facility: HOSPITAL | Age: 45
End: 2020-01-20

## 2020-01-20 VITALS
WEIGHT: 242 LBS | HEART RATE: 72 BPM | DIASTOLIC BLOOD PRESSURE: 91 MMHG | SYSTOLIC BLOOD PRESSURE: 150 MMHG | HEIGHT: 70 IN | BODY MASS INDEX: 34.65 KG/M2

## 2020-01-20 DIAGNOSIS — S46.212A RUPTURE OF LEFT DISTAL BICEPS TENDON, INITIAL ENCOUNTER: ICD-10-CM

## 2020-01-20 DIAGNOSIS — Z48.89 AFTERCARE FOLLOWING SURGERY: ICD-10-CM

## 2020-01-20 DIAGNOSIS — Z48.89 AFTERCARE FOLLOWING SURGERY: Primary | ICD-10-CM

## 2020-01-20 DIAGNOSIS — S46.212D RUPTURE OF LEFT DISTAL BICEPS TENDON, SUBSEQUENT ENCOUNTER: Primary | ICD-10-CM

## 2020-01-20 PROCEDURE — 97110 THERAPEUTIC EXERCISES: CPT

## 2020-01-20 PROCEDURE — 73080 X-RAY EXAM OF ELBOW: CPT

## 2020-01-20 PROCEDURE — 97140 MANUAL THERAPY 1/> REGIONS: CPT

## 2020-01-20 PROCEDURE — 99024 POSTOP FOLLOW-UP VISIT: CPT | Performed by: ORTHOPAEDIC SURGERY

## 2020-01-20 RX ORDER — GABAPENTIN 300 MG/1
300 CAPSULE ORAL DAILY
Qty: 30 CAPSULE | Refills: 1 | Status: SHIPPED | OUTPATIENT
Start: 2020-01-20

## 2020-01-20 NOTE — PROGRESS NOTES
Daily Note     Today's date: 2020  Patient name: Shen Torres  : 1975  MRN: [id number]  Referring provider: Gasper Tidwell MD  Dx:   Encounter Diagnosis     ICD-10-CM    1  Rupture of left distal biceps tendon, subsequent encounter S46 212D                   Subjective: The doctor xrayed my arm  He said everything is good  Objective: See treatment diary below      Assessment: Tolerated treatment well  Patient exhibited good technique with therapeutic exercises  AROM left elbow is -40/130 now  Sutures have been removed  Issued elastomer mold to wear over scar at night for scar mgt  Adjusted orthosis for elbow extension to -50      Plan: Continue per plan of care  Precautions:  Distal bicep repair 20  Follow protocol  Wound  Manual  1/10 1/13 1/20     Dressing change Adaptic, 4x4, aldo grullon Same as previous session DC     Edema massage  10' 5'     Scar massage   5' + elastomer mold                         Exercise Diary  1/10 1/13 1/20     PT ed Repair precations:  No lifting or elbow ext >-30 Donning, doffing splint    Reposition when it slips Elastomer mold for scar, scar massage     HEP AROM digits, wrist, forearm, elbow, shldr reviewed      Fist pumps x10 x20 x30     Wrist circles x10 x20 x30     A/AAROM foream x10  W/ elbow 90 10'' 10"     A/AAROM elbow -65/110 x 10 10'' 10" -40/130                                                                                                                         Modalities        MHP  10' pre tx

## 2020-01-20 NOTE — PROGRESS NOTES
SUBJECTIVE:  Dipak Pierre is a 38y o  year old male who presents for follow up after surgery, left distal biceps tendon repair performed on  1/8/2020  Today patient states that he has been removing his T scope at rest to work on motion of his elbow wrist and fingers  Patient states he is having some shooting pains at rest as well as numbness and tingling on the is radial aspect of his forearm that extends to the base of his thumb  Patient states he has been taking Advil for pain  Joselo Ivey        VITALS:  Vitals:    01/20/20 0823   BP: 150/91   Pulse: 72       PHYSICAL EXAMINATION:  General: well developed and well nourished, alert, oriented times 3 and appears comfortable  Psychiatric: Normal    MUSCULOSKELETAL EXAMINATION:  Left arm  Incision: Clean, dry, with sutures intact  Range of Motion: good strength against resisted supination and pronation, no pain with motion of elbow 0-70 deg   Neurovascular status: Neuro intact, good cap refill      STUDIES REVIEWED:  Images obtained today of the left elbow 3 views demonstrate endo button in good placement no acute osseouse abnormalities       PROCEDURES PERFORMED:  Procedures  No Procedures performed today      ASSESSMENT/PLAN:    S/p left biceps tendon repair 01/08/2020  * sutures removed today without complications  * Pt was advised to continue therapy where they will work on range of motion of his elbow as well as increase the extension on his T scope per protocol  * patient was prescribed gabapentin 300 mg to take prior to bed and was advised to continue Advil and Tylenol for pain  * patient was advised that the numbness and tingling is feeling on the radial aspect of his forearm is a due to disruption the nerve during surgery and will likely resolve over time  * patient was advised to take B6 which will help with the nerve healing  * patient will follow up in the office in 6 weeks  * patient is a  and was provided with a note, no work until cleared by physician    FOLLOW UP:  Return in about 6 weeks (around 3/2/2020)        TO DO AT NEXT VISIT:  Re-evaluation of current issue      Scribe Attestation    I,:   Jimbo Castillo am acting as a scribe while in the presence of the attending physician :        I,:   Naye Mcconnell MD personally performed the services described in this documentation    as scribed in my presence : No complaints

## 2020-01-20 NOTE — TELEPHONE ENCOUNTER
Patient is calling stating that he was supposed to have gabapentin filled for him but his pharmacy does not have it and I don't see a record of it in medications  He is wondering if this could be sent to [facility] in [facility]

## 2020-01-20 NOTE — LETTER
January 20, 2020     Patient: Adam Hernadez   YOB: 1975   Date of Visit: 1/20/2020       To Whom it May Concern:    Adam Hernadez is under my professional care  He was seen in my office on 1/20/2020  He may not return to work until cleared by physician  Pt will be reevaluated in about 6 weeks  If you have any questions or concerns, please don't hesitate to call           Sincerely,          Soy Goins MD        CC: Adam Celisino

## 2020-01-21 NOTE — ADDENDUM NOTE
Addendum  created 01/21/20 1109 by Javed Vergara MD    Diagnosis association updated, Intraprocedure Blocks edited, Intraprocedure SmartForms edited, Sign clinical note

## 2020-01-27 ENCOUNTER — OFFICE VISIT (OUTPATIENT)
Dept: OCCUPATIONAL THERAPY | Facility: CLINIC | Age: 45
End: 2020-01-27
Payer: COMMERCIAL

## 2020-01-27 DIAGNOSIS — S46.212D RUPTURE OF LEFT DISTAL BICEPS TENDON, SUBSEQUENT ENCOUNTER: Primary | ICD-10-CM

## 2020-01-27 PROCEDURE — 97140 MANUAL THERAPY 1/> REGIONS: CPT

## 2020-01-27 PROCEDURE — 97110 THERAPEUTIC EXERCISES: CPT

## 2020-01-27 NOTE — PROGRESS NOTES
Daily Note     Today's date: 2020  Patient name: Bernice Bernardo  : 1975  MRN: [id number]  Referring provider: Temo Vaughn MD  Dx:   Encounter Diagnosis     ICD-10-CM    1  Rupture of left distal biceps tendon, subsequent encounter S46 975D                   Subjective: If I'm sitting down, I take off the splint and elevate my arm  I'm careful to not straighten that elbow over 30  Objective: See treatment diary below      Assessment: Tolerated treatment well  Patient exhibited good technique with therapeutic exercises  AROM elbow -30/140  AROM supination 70  Biceps repair is intact      Plan: Continue per plan of care  Progress note during next visit  Precautions:  Distal bicep repair 20  Follow protocol  Wound  Manual  1/10 1/13 1/20 1/27    Dressing change Adaptic, 4x4, mitchel, stockinette Same as previous session DC     Edema massage  10' 5'     Scar massage   5' + elastomer mold 8'                        Exercise Diary  1/10 1/13 1/20 1/27    PT ed Repair precations:  No lifting or elbow ext >-30 Donning, doffing splint    Reposition when it slips Elastomer mold for scar, scar massage POC    HEP AROM digits, wrist, forearm, elbow, shldr reviewed  Reviewed HEP    Fist pumps x10 x20 x30 x30    Wrist circles x10 x20 x30 x30    A/AAROM foream x10  W/ elbow 90 10'' 10" 10'    A/AAROM elbow -65/110 x 10 10'' 10" -40/130 10', -30/140                                                                                                                        Modalities      MHP  10' pre tx  10' pre tx

## 2020-02-03 ENCOUNTER — APPOINTMENT (OUTPATIENT)
Dept: OCCUPATIONAL THERAPY | Facility: CLINIC | Age: 45
End: 2020-02-03
Payer: COMMERCIAL

## 2020-02-04 ENCOUNTER — OFFICE VISIT (OUTPATIENT)
Dept: OCCUPATIONAL THERAPY | Facility: CLINIC | Age: 45
End: 2020-02-04
Payer: COMMERCIAL

## 2020-02-04 DIAGNOSIS — S46.212D RUPTURE OF LEFT DISTAL BICEPS TENDON, SUBSEQUENT ENCOUNTER: Primary | ICD-10-CM

## 2020-02-04 PROCEDURE — 97530 THERAPEUTIC ACTIVITIES: CPT

## 2020-02-04 PROCEDURE — 97140 MANUAL THERAPY 1/> REGIONS: CPT

## 2020-02-04 PROCEDURE — 97110 THERAPEUTIC EXERCISES: CPT

## 2020-02-04 NOTE — PROGRESS NOTES
OT Re-Evaluation     Today's date: 2020  Patient name: Adam Hernadez  : 1975  MRN: [id number]  Referring provider: Lee Hairston MD  Dx:   Encounter Diagnosis     ICD-10-CM    1  Rupture of left distal biceps tendon, subsequent encounter S46 212D                   Assessment  Assessment details: This is a 38year old, right hand dominant male being seen following a repair of the left distal biceps on 19  Patient presents this date in a hinged elbow brace with extension limited to -70 degrees  Patient has a small amount of bleeding from the incision when the post op dressing was changed  He has edema at the elbow and hand and his biceps is atrophied  The bicep repair is intact  Patient has moderate pain and severe limitations in AROM of the left elbow and forearm  AROM of the wrist and hand is WNL  Patient educated about AROM program with no elbow extension beyond -30 degrees  He was also educated on elevation and wound care  He was instructed to wear splint at all times except to change his dressings and wash his arm  Splint will need to be repositioned often to prevent distal migration of the splint  This patient is a good candidate for OT services to restore LUE function to return to work  20:  Patient is now 4 weeks post op left distal biceps repair  The repair is intact  Edema has resolved  Pain is decreasing  Good sensation in the hand, but patient reports a small area of decreased sensation at the distal, radial forearm  Atrophy is noted in the biceps  AROM in the left elbow and forearm has improved with only minimal deficits in elbow extension and flexion and end range forearm pronation and supination  Strength was not tested  Patient is now allowed to remove the splint for full AROM exercises and showering, but must continue to wear the splint at all other times  Discharge splint in 2 weeks and begin strengthening in 4 weeks    Impairments: abnormal or restricted ROM, activity intolerance, impaired physical strength, pain with function and weight-bearing intolerance  Other impairment: atrophy in biceps  Functional limitations: Limited use of LUE for self care and light home management tasksBarriers to therapy: Limited number of therapy visits  Pain  Understanding of Dx/Px/POC: excellent  Goals  STGs ( 4 weeks)  1  Patient will be independent in a HEP for wound care, splint wear, and ROM of the elbow and forearm following protocol  MET  2  Patient will demonstrate full wound healing  MET  3  Patient will demonstrate resolution of edema in left hand  MET  4  Patient will report a 1-2 level decrease in LUE pain  MET  5  Patient will demonstrate 45/45 forearm pronation and supination  MET  6  Patient will demonstrate AROM left elbow to -30/130  MET  LTGS ( 12 weeks)  1  Patient will be independent in a HEP to maintain LUE strength and ROM at discharge  NOT MET  2  Patient will report a maximum pain level of 2/10 in the LUE during daily activities  NOT MET  3  Patient will demonstrate AROM left elbow to 0/140 and forearm roation to WNL to be independent in self care tasks  NOT MET  4  Patient will demonstrate left elbow, forearm, wrist strength to 5/5 to prepare to return to work  NOT MET  5  Patient will demonstrate left hand strength within 30% of the right hand to prepare to return to work  NOT MET    Plan  Plan details: 1/10/20:   Begin OT following distal bicep repair protocol  2/4/20:  Begin full AROM, discharge splint in 2 weeks    Patient would benefit from: orthotics, OT eval and skilled occupational therapy  Planned modality interventions: thermotherapy: hydrocollator packs and ultrasound  Planned therapy interventions: activity modification, graded activity, graded exercise, home exercise program, therapeutic exercise, therapeutic activities, stretching, patient education, orthotic management and training, neuromuscular re-education and manual therapy  Other planned therapy interventions: Scar mgt  Monitor hinged elbow brace  Frequency: 1x week  Duration in weeks: 12  Plan of Care beginning date: 1/10/2020  Plan of Care expiration date: 3/13/2020  Treatment plan discussed with: family and patient        Subjective Evaluation    History of Present Illness  Date of onset: 2019  Date of surgery: 2020  Mechanism of injury: surgery and trauma  Mechanism of injury: Patient reports that he was lifting a table on Tracy day and felt a pop and severe pain in his left elbow and upper arm  He went to the ED that date and diagnosed with a distal biceps tendon rupture and referred to Orthopedics  Patient has a biceps repair surgery with endo button on 20 and now presents for OT evaluation in post op dressing and hinged elbow brace  20:  " I can't wait to get this splint off "  I'm starting to use my hand now to do light tasks  Patient has been compliant with splint and HEP  Not a recurrent problem   Quality of life: good    Pain  Current pain ratin  At best pain ratin  At worst pain ratin  Location: Biceps and volar elbow and forearm  Quality: electric shock  Relieving factors: medications and ice (elevation)  Aggravating factors: nothing  Progression: improved    Social Support  Lives with: spouse and young children    Employment status: working (  On FMLA  Needs to be able to lift 50 lbs)  Hand dominance: right      Diagnostic Tests  MRI studies: abnormal  Treatments  No previous or current treatments  Patient Goals  Patient goals for therapy: decreased edema, decreased pain, increased motion, increased strength, return to work and independence with ADLs/IADLs  Patient goal: be able to hold daughter        Objective     Observations     Left Elbow   Postive for atrophy  Negative for drainage, edema and incision  Left Wrist/Hand   Negative for edema       Additional Observation Details  1/10/20: Minimal bloood drainage when post op dressing removed  Sutures intact  Tendon repair is intact  Atrophy in biceps, Edema at elbow  2/4/20:  Scar is supple ad well healed  No hypertrophy or hypersensitivity and 1/10/20:  Edema dorsum of left hand  2/4/20:  Edema has resolved    Neurological Testing     Sensation     Elbow   Left Elbow   Inact: static two point discrimination    Comments   Left static two point discrimination: Intact to 6 mm all digits  Wrist/Hand   Left   Intact: light touch    Additional Neurological Details  2/4/20:  Patient reports decreased sensation distal, radial forearm  Area of impaired sensation is decreasing in size    Active Range of Motion     Left Elbow   Flexion: 139 degrees with pain  Extension: -25 degrees with pain  Forearm supination: 70 degrees with pain  Forearm pronation: 75 degrees with pain    Left Wrist   Wrist flexion: WFL  Wrist extension: WFL  Radial deviation: WFL  Ulnar deviation: WFL      Left Thumb   Opposition: 1/10/20:  AROM thumb is WNL    Additional Active Range of Motion Details  2/4/20:  Supination improved 42 degrees, pronation improved 50 degrees, flexion improved 69, extension improved 20   1/10/20:  WNL except small deficit in wrist extension  2/4/20:  WNL  1/10/20:  AROM digits 2-5 is WNL             Precautions:  Distal bicep repair 1/8/20  Follow protocol  Wound                  Manual  1/10 1/13 1/20 1/27  2/4   Dressing change Adaptic, 4x4, aldo grullon Same as previous session DC       Edema massage   10' 5'       Scar massage     5' + elastomer mold 8'  8'                                     Exercise Diary  1/10 1/13 1/20 1/27  2/4   PT ed Repair precations:  No lifting or elbow ext >-30 Donning, doffing splint  Reposition when it slips Elastomer mold for scar, scar massage POC  POC  Remove splint 3x/day to perform full AROM to elbow     HEP AROM digits, wrist, forearm, elbow, shldr reviewed   Reviewed HEP     Fist pumps x10 x20 x30 x30  x30   Wrist circles x10 x20 x30 x30  x30   A/AAROM foream x10  W/ elbow 90 10'' 10" 10'  5'   A/AAROM elbow -65/110 x 10 10'' 10" -40/130 10', -30/140  full 5'    Cones for p/s          3 sets   Wrist maze          x20                                                                                                                                                                                 Modalities   1/13 1/27     MHP   10' pre tx   10' pre tx

## 2020-02-11 ENCOUNTER — OFFICE VISIT (OUTPATIENT)
Dept: OCCUPATIONAL THERAPY | Facility: CLINIC | Age: 45
End: 2020-02-11
Payer: COMMERCIAL

## 2020-02-11 DIAGNOSIS — S46.212D RUPTURE OF LEFT DISTAL BICEPS TENDON, SUBSEQUENT ENCOUNTER: Primary | ICD-10-CM

## 2020-02-11 PROCEDURE — 97530 THERAPEUTIC ACTIVITIES: CPT

## 2020-02-11 PROCEDURE — 97110 THERAPEUTIC EXERCISES: CPT

## 2020-02-11 NOTE — PROGRESS NOTES
Daily Note     Today's date: 2020  Patient name: Bhanu Mcdermott  : 1975  MRN: [id number]  Referring provider: Eric Victoria MD  Dx:   Encounter Diagnosis     ICD-10-CM    1  Rupture of left distal biceps tendon, subsequent encounter S46 731D                   Subjective: I've started walking to exercise  I'm taking the neurotin at night and I've stopped all the other pain medications  Objective: See treatment diary below      Assessment: Tolerated treatment well  Patient exhibited good technique with therapeutic exercises  Initiated AAROM for elbow extension  Elbow extension after tx = -5 degrees      Plan: Continue per plan of care  Precautions:  Distal bicep repair 20  Follow protocol  Wound                  Manual    2/4   Dressing change  Same as previous session DC       Edema massage   10' 5'       Scar massage  5'   5' + elastomer mold 8'  8'                                     Exercise Diary    2/4   PT ed  remove splint for AAROM ex for elbow ext Donning, doffing splint  Reposition when it slips Elastomer mold for scar, scar massage POC  POC  Remove splint 3x/day to perform full AROM to elbow     HEP  reviewed   Reviewed HEP     Fist pumps x10 x20 x30 x30  x30   Wrist circles x10 x20 x30 x30  x30   A/AAROM foream  5' 10'' 10" 10'  5'   A/AAROM elbow  10' in supine 10'' 10" -40/130 10', -30/140  full 5'    Cones for p/s  3 sets        3 sets   Wrist maze  x20        x20    Cones on clothespin pole for elbow ext  3 sets                                                                                                                                                                           Modalities        MHP   10' pre tx   10' pre tx

## 2020-02-17 ENCOUNTER — OFFICE VISIT (OUTPATIENT)
Dept: OCCUPATIONAL THERAPY | Facility: CLINIC | Age: 45
End: 2020-02-17
Payer: COMMERCIAL

## 2020-02-17 DIAGNOSIS — S46.212D RUPTURE OF LEFT DISTAL BICEPS TENDON, SUBSEQUENT ENCOUNTER: Primary | ICD-10-CM

## 2020-02-17 PROCEDURE — 97140 MANUAL THERAPY 1/> REGIONS: CPT

## 2020-02-17 PROCEDURE — 97530 THERAPEUTIC ACTIVITIES: CPT

## 2020-02-17 PROCEDURE — 97110 THERAPEUTIC EXERCISES: CPT

## 2020-02-17 NOTE — PROGRESS NOTES
Daily Note     Today's date: 2020  Patient name: Geovany Neil  : 1975  MRN: [id number]  Referring provider: Palmer Grullon MD  Dx:   Encounter Diagnosis     ICD-10-CM    1  Rupture of left distal biceps tendon, subsequent encounter S46 212D                   Subjective: My arm is feeling pretty good      Objective: See treatment diary below      Assessment: Tolerated treatment well  Patient exhibited good technique with therapeutic exercises  DC splint 20  No lifting over 1 lb with left elbow  Bicep repair is intact      Plan: Continue per plan of care  Precautions:  Distal bicep repair 20  Follow protocol  Wound                  Manual    2/4   Dressing change  Same as previous session DC       Edema massage   10' 5'       Scar massage  8'   5' + elastomer mold 8'  8'                                     Exercise Diary    2/4   PT ed  remove splint for AAROM ex for elbow ext DC splint   No lifting > 1 lb with elbow Elastomer mold for scar, scar massage POC  POC  Remove splint 3x/day to perform full AROM to elbow     HEP     Reviewed HEP     Fist pumps x10  x30 x30  x30   Wrist circles x10  x30 x30  x30   A/AAROM foream  5' x20 10" 10'  5'   A/AAROM elbow  10' in supine  x 30 10" -40/130 10', -30/140  full 5'    Cones for p/s  3 sets  3 sets      3 sets   Wrist maze  x20  x20      x20    Cones on clothespin pole for elbow ext  3 sets            Wrist curls e/f, dt    2# x 20 ea          Bicep curl    1# x 30          Tricep    1# x 30          Hand gripper    25# x 30                                                                                                                 Modalities        MHP   10' pre tx   10' pre tx

## 2020-02-24 ENCOUNTER — OFFICE VISIT (OUTPATIENT)
Dept: OCCUPATIONAL THERAPY | Facility: CLINIC | Age: 45
End: 2020-02-24
Payer: COMMERCIAL

## 2020-02-24 DIAGNOSIS — S46.212D RUPTURE OF LEFT DISTAL BICEPS TENDON, SUBSEQUENT ENCOUNTER: Primary | ICD-10-CM

## 2020-02-24 PROCEDURE — 97530 THERAPEUTIC ACTIVITIES: CPT

## 2020-02-24 PROCEDURE — 97110 THERAPEUTIC EXERCISES: CPT

## 2020-02-24 NOTE — PROGRESS NOTES
Daily Note     Today's date: 2020  Patient name: Titus Cade  : 1975  MRN: [id number]  Referring provider: Mosie Blizzard, MD  Dx:   Encounter Diagnosis     ICD-10-CM    1  Rupture of left distal biceps tendon, subsequent encounter S46 212D                   Subjective: It bothered me a little being out of the brace initially, with gravity pulling on my arm  But it feels ok now  Objective: See treatment diary below      Assessment: Tolerated treatment well  Patient exhibited good technique with therapeutic exercises  Doing better without the brace  Initially had some pain, just with gravitational pull on arm      Plan: Continue per plan of care  Precautions:  Distal bicep repair 20  Follow protocol  No lifting over 1# with elbow                 Manual    2   Dressing change   DC       Edema massage   5'       Scar massage  8'  5' 5' + elastomer mold 8'  8'                                     Exercise Diary    2/4   PT ed  remove splint for AAROM ex for elbow ext DC splint   No lifting > 1 lb with elbow  POC  POC  Remove splint 3x/day to perform full AROM to elbow     HEP     Reviewed HEP     Fist pumps x10   x30  x30   Wrist circles x10   x30  x30   A/AAROM foream  5' x20 1x20 10'  5'   A/AAROM elbow  10' in supine  x 30 30   full 5'    Cones for p/s  3 sets  3 sets  3 sets    3 sets   Wrist maze  x20  x20  20x    x20    Cones on clothespin pole for elbow ext  3 sets    3 sets blue CP        Wrist curls e/f, dt    2# x 20 ea  2# 3x10        Bicep curl    1# x 30  1# 3x10        Tricep    1# x 30  1# 3x10        Hand gripper    25# x 30  25# 30x        UBE      8' L1 0                                                                                                 Modalities        MHP   10' pre tx   10' pre tx

## 2020-03-02 ENCOUNTER — OFFICE VISIT (OUTPATIENT)
Dept: OBGYN CLINIC | Facility: MEDICAL CENTER | Age: 45
End: 2020-03-02

## 2020-03-02 VITALS
DIASTOLIC BLOOD PRESSURE: 93 MMHG | BODY MASS INDEX: 34.65 KG/M2 | SYSTOLIC BLOOD PRESSURE: 147 MMHG | HEIGHT: 70 IN | WEIGHT: 242 LBS | HEART RATE: 75 BPM

## 2020-03-02 DIAGNOSIS — S46.212A RUPTURE OF LEFT DISTAL BICEPS TENDON, INITIAL ENCOUNTER: ICD-10-CM

## 2020-03-02 DIAGNOSIS — Z48.89 AFTERCARE FOLLOWING SURGERY: Primary | ICD-10-CM

## 2020-03-02 PROCEDURE — 99024 POSTOP FOLLOW-UP VISIT: CPT | Performed by: ORTHOPAEDIC SURGERY

## 2020-03-02 RX ORDER — SILDENAFIL 100 MG/1
TABLET, FILM COATED ORAL
COMMUNITY

## 2020-03-02 NOTE — LETTER
March 2, 2020     Patient: Janie Hansen   YOB: 1975   Date of Visit: 3/2/2020       To Whom it May Concern:    Janie Hansen is under my professional care  He was seen in my office on 3/2/2020  He may return to work 3/3/2020 with no pushing pulling or lifting greater than 20 lbs  If you have any questions or concerns, please don't hesitate to call           Sincerely,          Favio Stokes MD        CC: No Recipients

## 2020-03-02 NOTE — PROGRESS NOTES
CHIEF COMPLAINT:  Chief Complaint   Patient presents with    Left Arm - Post-op       SUBJECTIVE:  Jeannine Rivas is a 38y o  year old  male who presents to the office for a follow-up after left distal biceps tendon repair performed 2020  Pt states that he has some numbness at the radial aspect of the forearm and some occasional tightness and itching at the incision site  Pt continues to attend therapy 1x a week to work on strengthening as well as working on it at home  Pt states that the Gabapentin keeps him up at night  PAST MEDICAL HISTORY:  Past Medical History:   Diagnosis Date    Asthma     childhood    Hypertension     Kidney stone        PAST SURGICAL HISTORY:  Past Surgical History:   Procedure Laterality Date    COLONOSCOPY      HEMORRHOID SURGERY      KNEE SURGERY Left     MA CYSTO/URETERO W/LITHOTRIPSY &INDWELL STENT INSRT Right 2017    Procedure: CYSTOSCOPY; URETEROSCOPY; RETROGRADE PYELOGRAM; HOLMIUM LASER LITHOTRIPSY; BASKET STONE EXTRACTION; STENT PLACEMENT ;  Surgeon: Daniella Lovett MD;  Location: [facility];  Service: Urology    MA REINSERT BI/TRICEPS TENDON,DISTAL Left 2020    Procedure: REPAIR TENDON BICEPS left;  Surgeon: Papa Dozier MD;  Location: [facility];  Service: Orthopedics       FAMILY HISTORY:  Family History   Problem Relation Age of Onset    No Known Problems Mother        SOCIAL HISTORY:  Social History     Tobacco Use    Smoking status: Former Smoker     Last attempt to quit:      Years since quittin 1    Smokeless tobacco: Never Used    Tobacco comment: quit when 24 y o  Substance Use Topics    Alcohol use:  Yes     Alcohol/week: 6 0 standard drinks     Types: 6 Cans of beer per week     Comment: 6 per week    Drug use: No       MEDICATIONS:    Current Outpatient Medications:     Acetaminophen (TYLENOL PO), Take by mouth, Disp: , Rfl:     gabapentin (NEURONTIN) 300 mg capsule, Take 1 capsule (300 mg total) by mouth daily At bedtime, Disp: 30 capsule, Rfl: 1    Ibuprofen (MOTRIN PO), Take by mouth, Disp: , Rfl:     lisinopril (ZESTRIL) 40 mg tablet, , Disp: , Rfl:     sildenafil (VIAGRA) 100 mg tablet, sildenafil 100 mg tablet  take 1 tablet by mouth PRIOR TO ACTIVITY, Disp: , Rfl:     ALLERGIES:  No Known Allergies    REVIEW OF SYSTEMS:  Review of Systems   Constitutional: Negative for chills, fever and unexpected weight change  HENT: Negative for hearing loss, nosebleeds and sore throat  Eyes: Negative for pain, redness and visual disturbance  Respiratory: Negative for cough, shortness of breath and wheezing  Cardiovascular: Negative for chest pain, palpitations and leg swelling  Gastrointestinal: Negative for abdominal pain, nausea and vomiting  Endocrine: Negative for polydipsia and polyuria  Genitourinary: Negative for dysuria and hematuria  Skin: Negative for rash and wound  Neurological: Negative for dizziness, light-headedness and headaches  Psychiatric/Behavioral: Negative for decreased concentration, dysphoric mood and suicidal ideas  The patient is not nervous/anxious          VITALS:  Vitals:    03/02/20 0801   BP: 147/93   Pulse: 75       LABS:  HgA1c: No results found for: HGBA1C  BMP:   Lab Results   Component Value Date    CALCIUM 9 6 01/06/2020    K 4 3 01/06/2020    CO2 27 01/06/2020     01/06/2020    BUN 11 01/06/2020    CREATININE 0 71 01/06/2020       _____________________________________________________  PHYSICAL EXAMINATION:  General: well developed and well nourished, alert, oriented times 3 and appears comfortable  Psychiatric: Normal  HEENT: Trachea Midline, No torticollis  Pulmonary: No audible wheezing or strider  Cardiovascular: No discernable arrhythmia   Skin: No masses, erythema, lacerations, fluctation, ulcerations  Neurovascular: Sensation Intact to the Median, Ulnar, Radial Nerve, Motor Intact to the Median, Ulnar, Radial Nerve and Pulses Intact    MUSCULOSKELETAL EXAMINATION:  Left elbow  Full flexion extension at the elbow without pain  5/5 resisted supination without pain or tightness    ___________________________________________________  STUDIES REVIEWED:  No studies reviewed  PROCEDURES PERFORMED:  Procedures  No Procedures performed today    _____________________________________________________  ASSESSMENT/PLAN:  Left distal biceps tendon repair-1/8/2020  * patient was advised to DC gabapentin  * patient's FMLA paperwork was filled out patient may return to work tomorrow with no lifting pulling pushing greater than 20 lb  * patient expected return to work date is 04/15/2020 without restrictions  * patient will follow up in the office for evaluation in 4 weeks     Diagnoses and all orders for this visit:    Aftercare following surgery    Rupture of left distal biceps tendon, initial encounter    Other orders  -     sildenafil (VIAGRA) 100 mg tablet; sildenafil 100 mg tablet   take 1 tablet by mouth PRIOR TO ACTIVITY        Follow Up:  Return in about 4 weeks (around 3/30/2020)      Work/school status:  Provided specific details above     To Do Next Visit:  Re-evaluation of current issue    Scribe Attestation    I,:   Ady Perez am acting as a scribe while in the presence of the attending physician :        I,:   Sheila Roque MD personally performed the services described in this documentation    as scribed in my presence :

## 2020-03-05 ENCOUNTER — OFFICE VISIT (OUTPATIENT)
Dept: OCCUPATIONAL THERAPY | Facility: CLINIC | Age: 45
End: 2020-03-05
Payer: COMMERCIAL

## 2020-03-05 DIAGNOSIS — S46.212D RUPTURE OF LEFT DISTAL BICEPS TENDON, SUBSEQUENT ENCOUNTER: Primary | ICD-10-CM

## 2020-03-05 PROCEDURE — 97110 THERAPEUTIC EXERCISES: CPT

## 2020-03-05 PROCEDURE — 97530 THERAPEUTIC ACTIVITIES: CPT

## 2020-03-11 ENCOUNTER — OFFICE VISIT (OUTPATIENT)
Dept: OCCUPATIONAL THERAPY | Facility: CLINIC | Age: 45
End: 2020-03-11
Payer: COMMERCIAL

## 2020-03-11 DIAGNOSIS — S46.212D RUPTURE OF LEFT DISTAL BICEPS TENDON, SUBSEQUENT ENCOUNTER: Primary | ICD-10-CM

## 2020-03-11 PROCEDURE — 97112 NEUROMUSCULAR REEDUCATION: CPT

## 2020-03-11 PROCEDURE — 97530 THERAPEUTIC ACTIVITIES: CPT

## 2020-03-11 PROCEDURE — 97110 THERAPEUTIC EXERCISES: CPT

## 2020-03-11 NOTE — PROGRESS NOTES
OT Re-Evaluation     Today's date: 3/11/2020  Patient name: Juliane Kendrick  : 1975  MRN: [id number]  Referring provider: Zahraa Azevedo MD  Dx:   Encounter Diagnosis     ICD-10-CM    1  Rupture of left distal biceps tendon, subsequent encounter S46 212D                   Assessment  Assessment details: This is a 38year old, right hand dominant male being seen following a repair of the left distal biceps on 19  Patient presents this date in a hinged elbow brace with extension limited to -70 degrees  Patient has a small amount of bleeding from the incision when the post op dressing was changed  He has edema at the elbow and hand and his biceps is atrophied  The bicep repair is intact  Patient has moderate pain and severe limitations in AROM of the left elbow and forearm  AROM of the wrist and hand is WNL  Patient educated about AROM program with no elbow extension beyond -30 degrees  He was also educated on elevation and wound care  He was instructed to wear splint at all times except to change his dressings and wash his arm  Splint will need to be repositioned often to prevent distal migration of the splint  This patient is a good candidate for OT services to restore LUE function to return to work  20:  Patient is now 4 weeks post op left distal biceps repair  The repair is intact  Edema has resolved  Pain is decreasing  Good sensation in the hand, but patient reports a small area of decreased sensation at the distal, radial forearm  Atrophy is noted in the biceps  AROM in the left elbow and forearm has improved with only minimal deficits in elbow extension and flexion and end range forearm pronation and supination  Strength was not tested  Patient is now allowed to remove the splint for full AROM exercises and showering, but must continue to wear the splint at all other times  Discharge splint in 2 weeks and begin strengthening in 4 weeks      3/11/20:  Patient is now 8 weeks post op  He demonstrates AROM in the LUE WNL  Bicep repair is intact  Strength is good, but endurance is limited  Patient is gradually resuming daily tasks and is scheduled to return to work in one month  Mild sensory deficit still noted in mid radial forearm  Patient has mild pain  Continue OT 1x every 2 weeks x 4 weeks for LUE strengthening and endurance then DC to HEP  Impairments: abnormal or restricted ROM, activity intolerance, impaired physical strength and pain with function  Other impairment: atrophy in biceps  Functional limitations: Limited use of LUE for self care and light home management tasksBarriers to therapy: Limited number of therapy visits  Pain  Understanding of Dx/Px/POC: excellent  Goals  STGs ( 4 weeks)  1  Patient will be independent in a HEP for wound care, splint wear, and ROM of the elbow and forearm following protocol  MET  2  Patient will demonstrate full wound healing  MET  3  Patient will demonstrate resolution of edema in left hand  MET  4  Patient will report a 1-2 level decrease in LUE pain  MET  5  Patient will demonstrate 45/45 forearm pronation and supination  MET  6  Patient will demonstrate AROM left elbow to -30/130  MET  LTGS ( 12 weeks)  1  Patient will be independent in a HEP to maintain LUE strength and ROM at discharge  ON GOING  2  Patient will report a maximum pain level of 2/10 in the LUE during daily activities  NOT  MET  3  Patient will demonstrate AROM left elbow to 0/140 and forearm roation to WNL to be independent in self care tasks  MET  4  Patient will demonstrate left elbow, forearm, wrist strength to 5/5 to prepare to return to work  MET  5  Patient will demonstrate left hand strength within 30% of the right hand to prepare to return to work  MET    Plan  Plan details: 1/10/20:   Begin OT following distal bicep repair protocol  2/4/20:  Begin full AROM, discharge splint in 2 weeks    3/11/20:  OT 1x every 2 weeks for LUE strength and endurance  Patient would benefit from: skilled occupational therapy  Planned modality interventions: thermotherapy: hydrocollator packs  Planned therapy interventions: activity modification, graded activity, graded exercise, home exercise program, therapeutic exercise, therapeutic activities, patient education, neuromuscular re-education, manual therapy and strengthening  Other planned therapy interventions: Scar mgt  Frequency: 1x week  Duration in weeks: 12  Plan of Care beginning date: 1/10/2020  Plan of Care expiration date: 4/10/2020  Treatment plan discussed with: family and patient        Subjective Evaluation    History of Present Illness  Date of onset: 2019  Date of surgery: 2020  Mechanism of injury: surgery and trauma  Mechanism of injury: Patient reports that he was lifting a table on [place] day and felt a pop and severe pain in his left elbow and upper arm  He went to the ED that date and diagnosed with a distal biceps tendon rupture and referred to Orthopedics  Patient has a biceps repair surgery with endo button on 20 and now presents for OT evaluation in post op dressing and hinged elbow brace  20:  " I can't wait to get this splint off "  I'm starting to use my hand now to do light tasks  Patient has been compliant with splint and HEP     3/11/20:  Patient reports he expects to return to work in one month  He reports some decrease in sensation in the volar forearm still  Occasional pinching sensation at surgical site  He has still been guarding his left arm for lifting tasks  Patient has stopped taking Gabapentin for pain              Not a recurrent problem   Quality of life: good    Pain  Current pain ratin  At best pain ratin  At worst pain rating: 3  Location: Biceps and volar elbow and forearm  Quality: burning (Pinching)  Relieving factors: medications and ice (Advil)  Aggravating factors: lifting  Progression: improved    Social Support  Lives with: spouse and young children    Employment status: working (  On FMLA  Needs to be able to lift 50 lbs)  Hand dominance: right      Diagnostic Tests  MRI studies: abnormal  Treatments  No previous or current treatments  Patient Goals  Patient goals for therapy: decreased edema, decreased pain, increased motion, increased strength, return to work and independence with ADLs/IADLs  Patient goal: be able to hold daughter        Objective     Observations     Left Elbow   Postive for atrophy  Negative for drainage, edema and incision  Left Wrist/Hand   Negative for edema  Additional Observation Details  1/10/20: Minimal bloood drainage when post op dressing removed  Sutures intact  Tendon repair is intact  Atrophy in biceps, Edema at elbow  2/4/20:  Scar is supple and well healed  No hypertrophy or hypersensitivity  3/11/10:  Bicep repair is intact and 1/10/20:  Edema dorsum of left hand  2/4/20:  Edema has resolved    Neurological Testing     Sensation     Elbow   Left Elbow   Inact: static two point discrimination  Diminished: light touch    Comments   Left light touch: reports decreased sensation radial mid forearm  Left static two point discrimination: Intact to 6 mm all digits  Wrist/Hand   Left   Intact: light touch    Additional Neurological Details  2/4/20:  Patient reports decreased sensation distal, radial forearm    Area of impaired sensation is decreasing in size    Active Range of Motion     Left Elbow   Flexion: 144 degrees WFL  Extension: 0 degrees WFL  Forearm supination: 80 degrees WFL  Forearm pronation: 80 degrees WFL    Left Wrist   Wrist flexion: WFL  Wrist extension: WFL  Radial deviation: WFL  Ulnar deviation: WFL      Left Thumb   Opposition: 1/10/20:  AROM thumb is WNL    Additional Active Range of Motion Details  2/4/20:  Supination improved 42 degrees, pronation improved 50 degrees, flexion improved 69, extension improved 20   3/11/20:  AROM is WNL now  1/10/20:  WNL except small deficit in wrist extension  2/4/20:  WNL  1/10/20:  AROM digits 2-5 is WNL    Strength/Myotome Testing     Left Elbow   Flexion: 5  Extension: 5  Forearm supination: 5  Forearm pronation: 5    Additional Strength Details  3/11/20:  Elbow and forearm strength is WNL   R= 135 lbs  L = 110 lbs             Precautions:  Distal bicep repair 1/8/20  Follow protocol  Wound                  Manual  2/17 2/24 1/20 1/27  2/4   Dressing change     DC       Edema massage     5'       Scar massage  8'  5' 5' + elastomer mold 8'  8'                                     Exercise Diary  2/11 2/17 2/24 3/3 3/11   PT ed  remove splint for AAROM ex for elbow ext DC splint 2/19  No lifting > 1 lb with elbow      POC    Use hand for all self care and light home mgt tasks   HEP             Fist pumps x10           Wrist circles x10           A/AAROM foream  5' x20 1x20      A/AAROM elbow  10' in supine  x 30 30    f    Cones for p/s  3 sets  3 sets  3 sets       Wrist maze  x20  x20  20x        Cones on clothespin pole for elbow ext  3 sets    3 sets blue CP        Wrist curls e/f, dt    2# x 20 ea  2# 3x10  3# 3x10  5#, 2x10    Bicep curl    1# x 30  1# 3x10  3# 3x10  10#, 3x10    Tricep    1# x 30  1# 3x10  3# 3x10  Lyons 15# x 30    Hand gripper    25# x 30  25# 30x  30# 30x  55# all pegs    UBE      8' L1 0  8' L 2 5  10", L2 3    Body Blade       1x 2 min Elbow 90 Abduction/flexoin      Lyons mid rows          15# x 20       B/l shldr ext          15# x 20    Lyons biceps          15# x 20    Sled          100# x 5 circuits pushing                       Modalities   1/13 1/27     MHP   10' pre tx   10' pre tx

## 2020-03-24 ENCOUNTER — OFFICE VISIT (OUTPATIENT)
Dept: OCCUPATIONAL THERAPY | Facility: CLINIC | Age: 45
End: 2020-03-24
Payer: COMMERCIAL

## 2020-03-24 DIAGNOSIS — S46.212D RUPTURE OF LEFT DISTAL BICEPS TENDON, SUBSEQUENT ENCOUNTER: Primary | ICD-10-CM

## 2020-03-24 PROCEDURE — 97530 THERAPEUTIC ACTIVITIES: CPT

## 2020-03-24 PROCEDURE — 97112 NEUROMUSCULAR REEDUCATION: CPT

## 2020-03-24 PROCEDURE — 97110 THERAPEUTIC EXERCISES: CPT

## 2020-03-24 NOTE — PROGRESS NOTES
Daily Note     Today's date: 3/24/2020  Patient name: Germania Luque  : 1975  MRN: [id number]  Referring provider: Tamie Ashley MD  Dx:   Encounter Diagnosis     ICD-10-CM    1  Rupture of left distal biceps tendon, subsequent encounter S46 212D                   Subjective: I've been picking up my daughter, but I'm careful  If I feel any pinching when I do stuff, I stop  Objective: See treatment diary below      Assessment: Tolerated treatment well  Patient exhibited good technique with therapeutic exercises  Bicep repair is intact  AROM is WNL   0/10 pain reported  Plan: Progress note during next visit  Potential discharge next visit  Precautions:  Distal bicep repair 20  Follow protocol  Wound                  Manual     Dressing change     DC       Edema massage     5'       Scar massage  8'  5' 5' + elastomer mold 8'  8'                                     Exercise Diary   3/24 2/17 2/24 3/3 3/11   PT ed   DC splint   No lifting > 1 lb with elbow      POC     Use hand for all self care and light home mgt tasks   HEP  wall push ups; firm TP for /pinch strength           Fist pumps            Wrist circles            A/AAROM foream  x20 1x20      A/AAROM elbow    x 30 30    f    Cones for p/s    3 sets  3 sets       Wrist maze    x20  20x        Cones on clothespin pole for elbow ext     3 sets blue CP        Wrist curls e/f, dt  5" x 30 all  2# x 20 ea  2# 3x10  3# 3x10  5#, 2x10    Bicep curl  Lyons 15# x 30  1# x 30  1# 3x10  3# 3x10  10#, 3x10    Tricep  Lyons 15# x 30  1# x 30  1# 3x10  3# 3x10  Lyons 15# x 30    Hand gripper  55# x 50  25# x 30  25# 30x  30# 30x  55# all pegs    UBE   10', L2 5   8' L1 0  8' L 2 5  10", L2 3    Body Blade       1x 2 min Elbow 90 Abduction/flexoin      Lyons mid rows  20# x 30        15# x 20       B/l shldr ext  20# x 30        15# x 20    Lyons biceps  see above        15# x 20    Sled  100# x 6 circuits        100# x 5 circuits pushing    box lift/carry  45#, 2 hands x 25 feet floor to waist                 Modalities   1/13 1/27     MHP   10' pre tx   10' pre tx

## 2020-04-02 ENCOUNTER — OFFICE VISIT (OUTPATIENT)
Dept: OCCUPATIONAL THERAPY | Facility: CLINIC | Age: 45
End: 2020-04-02
Payer: COMMERCIAL

## 2020-04-02 DIAGNOSIS — S46.212D RUPTURE OF LEFT DISTAL BICEPS TENDON, SUBSEQUENT ENCOUNTER: Primary | ICD-10-CM

## 2020-04-02 PROCEDURE — 97112 NEUROMUSCULAR REEDUCATION: CPT

## 2020-04-02 PROCEDURE — 97110 THERAPEUTIC EXERCISES: CPT

## 2020-04-02 PROCEDURE — 97530 THERAPEUTIC ACTIVITIES: CPT

## 2020-04-10 ENCOUNTER — APPOINTMENT (OUTPATIENT)
Dept: OCCUPATIONAL THERAPY | Facility: CLINIC | Age: 45
End: 2020-04-10
Payer: COMMERCIAL

## 2020-04-13 ENCOUNTER — OFFICE VISIT (OUTPATIENT)
Dept: OBGYN CLINIC | Facility: CLINIC | Age: 45
End: 2020-04-13
Payer: COMMERCIAL

## 2020-04-13 VITALS
HEART RATE: 76 BPM | HEIGHT: 70 IN | BODY MASS INDEX: 34.65 KG/M2 | SYSTOLIC BLOOD PRESSURE: 156 MMHG | DIASTOLIC BLOOD PRESSURE: 91 MMHG | WEIGHT: 242 LBS

## 2020-04-13 DIAGNOSIS — S46.212D RUPTURE OF LEFT DISTAL BICEPS TENDON, SUBSEQUENT ENCOUNTER: ICD-10-CM

## 2020-04-13 DIAGNOSIS — Z48.89 AFTERCARE FOLLOWING SURGERY: Primary | ICD-10-CM

## 2020-04-13 PROCEDURE — 99213 OFFICE O/P EST LOW 20 MIN: CPT | Performed by: ORTHOPAEDIC SURGERY

## 2020-07-30 ENCOUNTER — TELEPHONE (OUTPATIENT)
Dept: UROLOGY | Facility: CLINIC | Age: 45
End: 2020-07-30

## 2020-07-30 NOTE — TELEPHONE ENCOUNTER
Left a reminder message for patient letting him know he has a one year follow up coming up on 8/5/2020 at 9am  I also have reminded him that he should have his KUB xray done prior to his appointment  Office phone number was provided if patient can not make this appointment so he can cancel and reschedule

## 2020-07-31 NOTE — PROGRESS NOTES
Assessment and plan:       1  Nephrolithiasis  - No symptoms over the last year  - I am unable to appreciate any stones on his recent KUB  Awaiting radiology official read  - at this time he will plan to follow up on an as-needed basis  If his KUB does show continued stones, he can follow up in 1 year with KUB prior  Nicole Patel PA-C      Chief Complaint     Chief Complaint   Patient presents with    Nephrolithiasis         History of Present Illness     Umer Mahajan is a 54 y o  male patient previously managed by Dr Flako El for history of nephrolithiasis  He did undergo ureteroscopy for 10 mm stone at the right UPJ in June of 2017  He was then seen in March of 2018 for mild hydronephrosis secondary to a 3 mm left UVJ stone  Fortunately, the patient was able to pass this spontaneously  I I am not able to appreciate any stones on his recent KUB but it has not yet been read by Radiology  He reports no symptoms over the last year  He is apprehensive about potential further need for ureteroscopy as he did have erectile dysfunction and discomfort issues after the stent was removed  These have now resolved  He reports that he occasionally does have twinges in his back that remind him to drink water  Once he is hydrated they go away  He is trying to hydrate more aggressively and has "cleaned up" his diet  He is taking a daily multivitamin as well  Laboratory     Lab Results   Component Value Date    CREATININE 0 71 01/06/2020       No results found for: PSA    No results found for this or any previous visit (from the past 1 hour(s))  Review of Systems     Review of Systems   Constitutional: Negative for chills and fever  HENT: Negative  Eyes: Negative  Respiratory: Negative for cough and shortness of breath  Cardiovascular: Negative for chest pain  Gastrointestinal: Negative for constipation, diarrhea, nausea and vomiting  Endocrine: Negative  Genitourinary: Negative for difficulty urinating, dysuria, enuresis, flank pain, frequency, hematuria and urgency  Musculoskeletal: Negative  Skin: Negative  Allergies     No Known Allergies    Physical Exam     Physical Exam  Vitals signs and nursing note reviewed  Constitutional:       General: He is not in acute distress  Appearance: He is well-developed  He is not diaphoretic  HENT:      Head: Normocephalic and atraumatic  Right Ear: External ear normal       Left Ear: External ear normal       Nose: Nose normal    Eyes:      General: No scleral icterus  Right eye: No discharge  Left eye: No discharge  Cardiovascular:      Rate and Rhythm: Normal rate  Pulmonary:      Effort: Pulmonary effort is normal    Abdominal:      Tenderness: There is no right CVA tenderness or left CVA tenderness  Musculoskeletal:      Comments: Ambulates independently   Skin:     General: Skin is warm and dry  Neurological:      Mental Status: He is alert and oriented to person, place, and time  Psychiatric:         Behavior: Behavior normal          Thought Content:  Thought content normal          Judgment: Judgment normal            Vital Signs     Vitals:    08/05/20 0845   BP: 134/84   BP Location: Left arm   Patient Position: Sitting   Cuff Size: Standard   Pulse: 72   Temp: (!) 97 2 °F (36 2 °C)   TempSrc: Temporal   Weight: 105 kg (231 lb 3 2 oz)   Height: 5' 10"         Current Medications       Current Outpatient Medications:     Acetaminophen (TYLENOL PO), Take by mouth, Disp: , Rfl:     Ibuprofen (MOTRIN PO), Take by mouth, Disp: , Rfl:     lisinopril (ZESTRIL) 40 mg tablet, , Disp: , Rfl:     sildenafil (VIAGRA) 100 mg tablet, sildenafil 100 mg tablet  take 1 tablet by mouth PRIOR TO ACTIVITY, Disp: , Rfl:     gabapentin (NEURONTIN) 300 mg capsule, Take 1 capsule (300 mg total) by mouth daily At bedtime (Patient not taking: Reported on 8/5/2020), Disp: 30 capsule, Rfl: 1      Active Problems     Patient Active Problem List   Diagnosis    Rupture of left distal biceps tendon         Past Medical History     Past Medical History:   Diagnosis Date    Asthma     childhood    Hypertension     Kidney stone          Surgical History     Past Surgical History:   Procedure Laterality Date    COLONOSCOPY      HEMORRHOID SURGERY      KNEE SURGERY Left     AR CYSTO/URETERO W/LITHOTRIPSY &INDWELL STENT INSRT Right 6/8/2017    Procedure: CYSTOSCOPY; URETEROSCOPY; RETROGRADE PYELOGRAM; HOLMIUM LASER LITHOTRIPSY; BASKET STONE EXTRACTION; STENT PLACEMENT ;  Surgeon: Philip Harper MD;  [facility];  Service: Urology    AR REINSERT BI/TRICEPS [facility] Left 1/8/2020    Procedure: REPAIR TENDON BICEPS left;  Surgeon: Maude Taylor MD;  Location: [facility];  Service: Orthopedics         Family History     Family History   Problem Relation Age of Onset    No Known Problems Mother          Social History     Social History       Radiology

## 2020-08-03 ENCOUNTER — APPOINTMENT (OUTPATIENT)
Dept: RADIOLOGY | Facility: MEDICAL CENTER | Age: 45
End: 2020-08-03
Payer: COMMERCIAL

## 2020-08-03 DIAGNOSIS — N20.0 KIDNEY STONE: ICD-10-CM

## 2020-08-03 PROCEDURE — 74018 RADEX ABDOMEN 1 VIEW: CPT

## 2020-08-05 ENCOUNTER — TELEPHONE (OUTPATIENT)
Dept: UROLOGY | Facility: CLINIC | Age: 45
End: 2020-08-05

## 2020-08-05 ENCOUNTER — OFFICE VISIT (OUTPATIENT)
Dept: UROLOGY | Facility: CLINIC | Age: 45
End: 2020-08-05
Payer: COMMERCIAL

## 2020-08-05 VITALS
DIASTOLIC BLOOD PRESSURE: 84 MMHG | WEIGHT: 231.2 LBS | BODY MASS INDEX: 33.1 KG/M2 | TEMPERATURE: 97.2 F | HEIGHT: 70 IN | HEART RATE: 72 BPM | SYSTOLIC BLOOD PRESSURE: 134 MMHG

## 2020-08-05 DIAGNOSIS — N20.0 CALCULUS OF KIDNEY: Primary | ICD-10-CM

## 2020-08-05 PROCEDURE — 99213 OFFICE O/P EST LOW 20 MIN: CPT | Performed by: PHYSICIAN ASSISTANT

## 2020-08-05 NOTE — TELEPHONE ENCOUNTER
A detailed message, okay per communication consent, informing patient that his KUB results came back negative for any stones and that he is to follow up as discussed previously  I left our call back number if he has any further questions or concerns

## 2020-08-05 NOTE — TELEPHONE ENCOUNTER
----- Message from Gala Puckett PA-C sent at 8/5/2020 10:00 AM EDT -----  Please let the patient know that his x-ray does not show any stones  He can follow up as needed as previously discussed

## 2020-12-04 ENCOUNTER — OFFICE VISIT (OUTPATIENT)
Dept: URGENT CARE | Age: 45
End: 2020-12-04
Payer: COMMERCIAL

## 2020-12-04 VITALS
RESPIRATION RATE: 20 BRPM | OXYGEN SATURATION: 97 % | TEMPERATURE: 97.5 F | DIASTOLIC BLOOD PRESSURE: 90 MMHG | HEART RATE: 75 BPM | SYSTOLIC BLOOD PRESSURE: 140 MMHG

## 2020-12-04 DIAGNOSIS — J20.9 ACUTE BRONCHITIS, UNSPECIFIED ORGANISM: ICD-10-CM

## 2020-12-04 DIAGNOSIS — J06.9 ACUTE UPPER RESPIRATORY INFECTION: Primary | ICD-10-CM

## 2020-12-04 PROCEDURE — 87637 SARSCOV2&INF A&B&RSV AMP PRB: CPT | Performed by: FAMILY MEDICINE

## 2020-12-04 PROCEDURE — 99213 OFFICE O/P EST LOW 20 MIN: CPT | Performed by: FAMILY MEDICINE

## 2020-12-04 RX ORDER — AZITHROMYCIN 250 MG/1
TABLET, FILM COATED ORAL
Qty: 6 TABLET | Refills: 0 | Status: SHIPPED | OUTPATIENT
Start: 2020-12-04 | End: 2020-12-08

## 2020-12-12 LAB
FLUAV RNA NPH QL NAA+PROBE: NOT DETECTED
FLUBV RNA NPH QL NAA+PROBE: NOT DETECTED
RSV RNA NPH QL NAA+PROBE: NOT DETECTED
SARS-COV-2 RNA NPH QL NAA+PROBE: NOT DETECTED

## 2020-12-13 ENCOUNTER — TELEPHONE (OUTPATIENT)
Dept: URGENT CARE | Age: 45
End: 2020-12-13

## 2021-04-08 DIAGNOSIS — Z23 ENCOUNTER FOR IMMUNIZATION: ICD-10-CM

## 2021-04-19 ENCOUNTER — TELEPHONE (OUTPATIENT)
Dept: UROLOGY | Facility: MEDICAL CENTER | Age: 46
End: 2021-04-19

## 2021-04-19 DIAGNOSIS — R31.29 MICROHEMATURIA: ICD-10-CM

## 2021-04-19 DIAGNOSIS — N20.0 KIDNEY STONE: Primary | ICD-10-CM

## 2021-04-19 NOTE — TELEPHONE ENCOUNTER
Patient with history of nephrolithiasis managed in the [facility] office  Patient last seen 8/5/20, KUB showed no stones, plan was to follow up on an as needed basis  Patient now calling in with blood on urine dip at work  Would you recommend imaging as well as urine testing, or something else?

## 2021-04-19 NOTE — TELEPHONE ENCOUNTER
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

## 2021-04-19 NOTE — TELEPHONE ENCOUNTER
Patient managed by Aleks Cisse  Patient states he had a physical at work and on dip stick test it showed he had blood in his urine  Patient concerned due to his history with stones and would like testing done  Please advise

## 2021-04-21 NOTE — TELEPHONE ENCOUNTER
Patient UA shows it was discontinued  Called and spoke to lab, they state the lab was pulled but the lab did not receive the specimen      She states no one has called the patient     Advised I will call and speak with patient to see what may have occurred       Called and spoke to patient at this time   Patient states he did provide a urine sample to the lab    Advised I do apologize unfortunately the lab states a new specimen will be needed     Patient states he is very frustrated with this situation     Again apologized for this inconvenient, advised new order placed and our office will monitor for the results     Patient is thankful for the call

## 2021-04-22 ENCOUNTER — APPOINTMENT (OUTPATIENT)
Dept: LAB | Facility: MEDICAL CENTER | Age: 46
End: 2021-04-22
Payer: COMMERCIAL

## 2021-04-22 DIAGNOSIS — N20.0 KIDNEY STONE: ICD-10-CM

## 2021-04-22 LAB
BACTERIA UR QL AUTO: ABNORMAL /HPF
BILIRUB UR QL STRIP: NEGATIVE
CAOX CRY URNS QL MICRO: ABNORMAL /HPF
CLARITY UR: CLEAR
COLOR UR: YELLOW
GLUCOSE UR STRIP-MCNC: NEGATIVE MG/DL
HGB UR QL STRIP.AUTO: ABNORMAL
KETONES UR STRIP-MCNC: ABNORMAL MG/DL
LEUKOCYTE ESTERASE UR QL STRIP: NEGATIVE
NITRITE UR QL STRIP: NEGATIVE
NON-SQ EPI CELLS URNS QL MICRO: ABNORMAL /HPF
PH UR STRIP.AUTO: 5.5 [PH]
PROT UR STRIP-MCNC: NEGATIVE MG/DL
RBC #/AREA URNS AUTO: ABNORMAL /HPF
SP GR UR STRIP.AUTO: 1.03 (ref 1–1.03)
UROBILINOGEN UR QL STRIP.AUTO: 0.2 E.U./DL
WBC #/AREA URNS AUTO: ABNORMAL /HPF

## 2021-04-22 PROCEDURE — 81001 URINALYSIS AUTO W/SCOPE: CPT

## 2021-04-23 NOTE — TELEPHONE ENCOUNTER
ua does show formal microhematuria 2-4 rbcs   Please proceed with renal bladder US, schedule f/u with AP in next 1-2 months

## 2021-04-27 NOTE — TELEPHONE ENCOUNTER
Called and spoke to patient at this time advised per provider     Alison Maharaj PA-C    3:56 PM  Note     ua does show formal microhematuria 2-4 rbcs  Please proceed with renal bladder US, schedule f/u with AP in next 1-2 months        Provided patient with number to central scheduling       Patient scheduled 6/1/2021 at 1 am for office follow up     Patient will call and get US scheduled    Advised our office will monitor for results    Patient verbalized understanding and is thankful for call

## 2021-04-30 ENCOUNTER — HOSPITAL ENCOUNTER (OUTPATIENT)
Dept: ULTRASOUND IMAGING | Facility: HOSPITAL | Age: 46
Discharge: HOME | End: 2021-04-30
Payer: COMMERCIAL

## 2021-04-30 DIAGNOSIS — R31.29 MICROHEMATURIA: ICD-10-CM

## 2021-04-30 PROCEDURE — 76770 US EXAM ABDO BACK WALL COMP: CPT

## 2021-05-05 NOTE — TELEPHONE ENCOUNTER
Per previous notes from Yfn Wolf PA-C:  Diallo Childers PA-C    3:56 PM  Note     ua does show formal microhematuria 2-4 rbcs  Please proceed with renal bladder US, schedule f/u with AP in next 1-2 months        Will US be reviewed at appt? Also does patient need to be scheduled for cysto, or will this be coordinated at upcoming appt?

## 2021-05-05 NOTE — TELEPHONE ENCOUNTER
Please let the patient know the good news that his US is negative for any abnormalities  I would recommend scheduling cystoscopy (nonurgently) to complete hematuria evaluation  Does not need AP visit unless having any other issues in the interim  Thank you

## 2021-05-05 NOTE — TELEPHONE ENCOUNTER
Called and spoke to patient at this time     Advised per provider   Anette Boykin PA-C  to [facility]     9:41 AM  Note     Please let the patient know the good news that his US is negative for any abnormalities  I would recommend scheduling cystoscopy (nonurgently) to complete hematuria evaluation  Does not need AP visit unless having any other issues in the interim  Thank you  Advised can cancel patient appt on 6/1/2021  Patient agreeable     Advised we can assist with scheduling patient for cysto    Patient states since it is non urgent he will call back to schedule cysto     He states he had one yrs ago when he had a cysto stent removal and it was not fun       Explained he would be jsut having the cysto   Explained process with him     Patient still wants to wait to schedule and will call back     Patient thankful for call

## 2021-05-11 NOTE — TELEPHONE ENCOUNTER
I spoke to pt about scheduling cysto and he said " I'm not doing that"  I let him know I would documented that he declined the recommendation and he said that's fine

## 2021-08-16 ENCOUNTER — NURSE TRIAGE (OUTPATIENT)
Dept: OTHER | Facility: OTHER | Age: 46
End: 2021-08-16

## 2021-08-16 DIAGNOSIS — Z11.59 SPECIAL SCREENING EXAMINATION FOR VIRAL DISEASE: Primary | ICD-10-CM

## 2021-08-16 PROCEDURE — U0003 INFECTIOUS AGENT DETECTION BY NUCLEIC ACID (DNA OR RNA); SEVERE ACUTE RESPIRATORY SYNDROME CORONAVIRUS 2 (SARS-COV-2) (CORONAVIRUS DISEASE [COVID-19]), AMPLIFIED PROBE TECHNIQUE, MAKING USE OF HIGH THROUGHPUT TECHNOLOGIES AS DESCRIBED BY CMS-2020-01-R: HCPCS | Performed by: FAMILY MEDICINE

## 2021-08-16 PROCEDURE — U0005 INFEC AGEN DETEC AMPLI PROBE: HCPCS | Performed by: FAMILY MEDICINE

## 2021-08-16 NOTE — TELEPHONE ENCOUNTER
Regarding: COVID, symptoms, unknown expsoure, non [facility] PCP  ----- Message from Jana Mcintosh sent at 8/16/2021  7:53 AM EDT -----  "I have body aches, chills, sore throat, congestion, fever 100 7; I was not exposed that I know of and I have been vaccinated "  1  Were you within 6 feet or less, for up to 15 minutes or more with a person that has a confirmed COVID-19 test? no  2  What was the date of your exposure? Unknown   3  Are you experiencing any symptoms attributed to the virus?  (Assess for SOB, cough, fever, difficulty breathing) body aches, chills, sore throat, congestion, fever 100 7  4  HIGH RISK: Do you have any history heart or lung conditions, weakened immune system, diabetes, Asthma, CHF, HIV, COPD, Chemo, renal failure, sickle cell, etc? no  5   PREGNANCY: Are you pregnant or did you recently give birth? no

## 2021-08-16 NOTE — TELEPHONE ENCOUNTER
Reason for Disposition   [1] COVID-19 infection suspected by caller or triager AND [2] mild symptoms (cough, fever, or others) AND [0] no complications or SOB    Additional Information   [1] COVID-19 vaccine series completed (fully vaccinated) AND [2] new-onset of COVID-19 symptoms BUT [3] no known exposure    Protocols used: CORONAVIRUS (COVID-19) DIAGNOSED OR SUSPECTED-ADULT-[place]

## 2021-08-17 ENCOUNTER — OFFICE VISIT (OUTPATIENT)
Dept: URGENT CARE | Facility: MEDICAL CENTER | Age: 46
End: 2021-08-17
Payer: COMMERCIAL

## 2021-08-17 VITALS — TEMPERATURE: 97.4 F | HEART RATE: 78 BPM | OXYGEN SATURATION: 96 %

## 2021-08-17 DIAGNOSIS — B08.4 HAND, FOOT AND MOUTH DISEASE: Primary | ICD-10-CM

## 2021-08-17 PROCEDURE — 99213 OFFICE O/P EST LOW 20 MIN: CPT | Performed by: PHYSICIAN ASSISTANT

## 2021-08-17 RX ORDER — AMLODIPINE BESYLATE 10 MG/1
TABLET ORAL DAILY
COMMUNITY

## 2021-08-17 NOTE — LETTER
August 17, 2021     Patient: Meghan Newby   YOB: 1975   Date of Visit: 8/17/2021       To Whom it May Concern:    Meghan Newby was seen in my clinic on 8/17/2021  Please excuse from work until symptoms have cleared as patient has highly contagious virus  If you have any questions or concerns, please don't hesitate to call           Sincerely,          Jarrod Sarmiento PA-C        CC: Meghan Newby

## 2021-08-17 NOTE — PATIENT INSTRUCTIONS
Hand, Foot, and Mouth Disease   WHAT YOU NEED TO KNOW:   Hand, foot, and mouth disease (HFMD) is an infection caused by a virus  HFMD is easily spread from person to person through direct contact  Anyone can get HFMD, but it is most common in children younger than 5 years  DISCHARGE INSTRUCTIONS:   Return to the emergency department if:   · You have trouble breathing, are breathing very fast, or you cough up pink, foamy spit  · You have a high fever and your heart is beating much faster than it usually does  · You have a severe headache, stiff neck, and back pain  · You become confused and sleepy  · You have trouble moving, or cannot move part of your body  · You urinate less than normal or not at all  Call your doctor if:   · Your mouth or throat are so sore you cannot eat or drink  · Your fever, sore throat, mouth sores, or rash do not go away after 10 days  · You have questions or concerns about your condition or care  Medicines: You may need any of the following:  · Acetaminophen  decreases pain and fever  It is available without a doctor's order  Ask how much to take and how often to take it  Follow directions  Read the labels of all other medicines you are using to see if they also contain acetaminophen, or ask your doctor or pharmacist  Acetaminophen can cause liver damage if not taken correctly  Do not use more than 4 grams (4,000 milligrams) total of acetaminophen in one day  · NSAIDs , such as ibuprofen, help decrease swelling, pain, and fever  This medicine is available with or without a doctor's order  NSAIDs can cause stomach bleeding or kidney problems in certain people  If you take blood thinner medicine, always ask if NSAIDs are safe for you  Always read the medicine label and follow directions  Do not give these medicines to children under 10months of age without direction from your child's healthcare provider  · Take your medicine as directed    Contact your effective for you

## 2021-08-17 NOTE — PROGRESS NOTES
[facility]        NAME: Gema Casey is a 51 y o  male  : 1975    MRN: [id number]  DATE: 2021  TIME: 1:07 PM    Assessment and Plan   Hand, foot and mouth disease [B08 4]  1  Hand, foot and mouth disease         Advised continuing symptomatic treatment and note given for work  Patient Instructions     May continue mouth rinse   Tylenol or Motrin for pain  Follow up with PCP in 3-5 days  Proceed to  ER if symptoms worsen  Chief Complaint     Chief Complaint   Patient presents with    Sore Throat     Started  with sore throat, fever 100 7, body aches  His daughter week ago was diagnosed with hand, foot mouth week ago  Has been taking dayquil and nyquil  Gargles with hydrogene peroxide  Is covid vaccinated May 2021   Rash     Started today with red bumps on mouth and hands  History of Present Illness        Patient is a 79-year-old male who presents today with complaints of 2 days of fever, chills, body aches and sore throat followed by rash on hands and feet  His daughter was recently diagnosed with hand-foot-mouth disease  Patient states the fever has finally stopped and the body aches are better  He needs note for work  Review of Systems   Review of Systems   Constitutional: Positive for chills and fever  HENT: Positive for sore throat  Negative for congestion and ear pain  Respiratory: Negative for cough  Cardiovascular: Negative for chest pain  Musculoskeletal: Positive for myalgias  Skin: Positive for rash           Current Medications       Current Outpatient Medications:     lisinopril (ZESTRIL) 40 mg tablet, , Disp: , Rfl:     Multiple Vitamin (MULTIVITAMIN ADULT PO), Take by mouth, Disp: , Rfl:     Acetaminophen (TYLENOL PO), Take by mouth, Disp: , Rfl:     amLODIPine (NORVASC) 10 mg tablet, Daily, Disp: , Rfl:     gabapentin (NEURONTIN) 300 mg capsule, Take 1 capsule (300 mg total) by mouth daily At bedtime (Patient not taking: Reported on 8/5/2020), Disp: 30 capsule, Rfl: 1    Ibuprofen (MOTRIN PO), Take by mouth, Disp: , Rfl:     sildenafil (VIAGRA) 100 mg tablet, sildenafil 100 mg tablet  take 1 tablet by mouth PRIOR TO ACTIVITY, Disp: , Rfl:     Current Allergies     Allergies as of 08/17/2021    (No Known Allergies)            The following portions of the patient's history were reviewed and updated as appropriate: allergies, current medications, past family history, past medical history, past social history, past surgical history and problem list      Past Medical History:   Diagnosis Date    Asthma     childhood    Hypertension     Kidney stone        Past Surgical History:   Procedure Laterality Date    COLONOSCOPY      HEMORRHOID SURGERY      KNEE SURGERY Left     [place] CYSTO/URETERO W/LITHOTRIPSY &INDWELL STENT INSRT Right 6/8/2017    Procedure: CYSTOSCOPY; URETEROSCOPY; RETROGRADE PYELOGRAM; HOLMIUM LASER LITHOTRIPSY; BASKET STONE EXTRACTION; STENT PLACEMENT ;  Surgeon: Edwige Price MD;  [facility];  Service: Urology    [place] REINSERT BI/TRICEPS TENDON,DISTAL Left 1/8/2020    Procedure: REPAIR TENDON BICEPS left;  Surgeon: Robyn Hutchins MD;  [facility]: [facility];  Service: Orthopedics       Family History   Problem Relation Age of Onset    No Known Problems Mother          Medications have been verified  Objective   Pulse 78   Temp (!) 97 4 °F (36 3 °C)   SpO2 96%        Physical Exam     Physical Exam  Constitutional:       General: He is not in acute distress  Appearance: He is well-developed  He is not ill-appearing  HENT:      Head: Normocephalic and atraumatic  Right Ear: Tympanic membrane and ear canal normal       Left Ear: Tympanic membrane and ear canal normal       Nose: No congestion or rhinorrhea  Mouth/Throat:      Lips: Pink  Mouth: Mucous membranes are moist  Oral lesions present  Pharynx: Uvula midline  Posterior oropharyngeal erythema present   No pharyngeal swelling, oropharyngeal exudate or uvula swelling  Tonsils: No tonsillar exudate or tonsillar abscesses  1+ on the right  1+ on the left  Cardiovascular:      Rate and Rhythm: Normal rate and regular rhythm  Pulmonary:      Effort: Pulmonary effort is normal       Breath sounds: Normal breath sounds  Musculoskeletal:      Cervical back: Neck supple  Lymphadenopathy:      Cervical: No cervical adenopathy  Skin:     General: Skin is warm  Findings: Rash present  Comments:   Blistering erythematous rash noted on hands and feet   Neurological:      Mental Status: He is alert  no

## 2022-01-24 ENCOUNTER — APPOINTMENT (OUTPATIENT)
Dept: URGENT CARE | Age: 47
End: 2022-01-24
Payer: OTHER MISCELLANEOUS

## 2022-01-24 ENCOUNTER — APPOINTMENT (OUTPATIENT)
Dept: RADIOLOGY | Age: 47
End: 2022-01-24
Payer: OTHER MISCELLANEOUS

## 2022-01-24 DIAGNOSIS — T14.90XA INJURY: ICD-10-CM

## 2022-01-24 DIAGNOSIS — T14.90XA INJURY: Primary | ICD-10-CM

## 2022-01-24 PROCEDURE — 99283 EMERGENCY DEPT VISIT LOW MDM: CPT | Performed by: NURSE PRACTITIONER

## 2022-01-24 PROCEDURE — G0382 LEV 3 HOSP TYPE B ED VISIT: HCPCS | Performed by: NURSE PRACTITIONER

## 2022-01-24 PROCEDURE — 73030 X-RAY EXAM OF SHOULDER: CPT

## 2022-01-27 ENCOUNTER — APPOINTMENT (OUTPATIENT)
Dept: URGENT CARE | Age: 47
End: 2022-01-27
Payer: OTHER MISCELLANEOUS

## 2022-01-27 PROCEDURE — 99213 OFFICE O/P EST LOW 20 MIN: CPT | Performed by: NURSE PRACTITIONER

## 2022-02-01 ENCOUNTER — APPOINTMENT (OUTPATIENT)
Dept: URGENT CARE | Age: 47
End: 2022-02-01
Payer: OTHER MISCELLANEOUS

## 2022-02-01 PROCEDURE — 99213 OFFICE O/P EST LOW 20 MIN: CPT | Performed by: NURSE PRACTITIONER

## 2022-04-05 ENCOUNTER — APPOINTMENT (OUTPATIENT)
Dept: URGENT CARE | Age: 47
End: 2022-04-05

## 2022-11-30 ENCOUNTER — OFFICE VISIT (OUTPATIENT)
Dept: URGENT CARE | Facility: CLINIC | Age: 47
End: 2022-11-30

## 2022-11-30 VITALS — OXYGEN SATURATION: 95 % | TEMPERATURE: 97.7 F | HEART RATE: 68 BPM | RESPIRATION RATE: 20 BRPM

## 2022-11-30 DIAGNOSIS — R68.89 FLU-LIKE SYMPTOMS: ICD-10-CM

## 2022-11-30 DIAGNOSIS — R68.83 CHILLS (WITHOUT FEVER): ICD-10-CM

## 2022-11-30 DIAGNOSIS — J20.9 ACUTE BRONCHITIS, UNSPECIFIED ORGANISM: Primary | ICD-10-CM

## 2022-11-30 RX ORDER — PREDNISONE 20 MG/1
40 TABLET ORAL DAILY
Qty: 10 TABLET | Refills: 0 | Status: SHIPPED | OUTPATIENT
Start: 2022-11-30 | End: 2022-12-05

## 2022-11-30 RX ORDER — BROMPHENIRAMINE MALEATE, PSEUDOEPHEDRINE HYDROCHLORIDE, AND DEXTROMETHORPHAN HYDROBROMIDE 2; 30; 10 MG/5ML; MG/5ML; MG/5ML
5 SYRUP ORAL 4 TIMES DAILY PRN
Qty: 120 ML | Refills: 0 | Status: SHIPPED | OUTPATIENT
Start: 2022-11-30

## 2022-11-30 NOTE — PROGRESS NOTES
[facility]        NAME: Jay Nieto is a 49 y o  male  : 1975    MRN: [id number]  DATE: 2022  TIME: 10:50 AM    Assessment and Plan   Acute bronchitis, unspecified organism [J20 9]  1  Acute bronchitis, unspecified organism  predniSONE 20 mg tablet    brompheniramine-pseudoephedrine-DM 30-2-10 MG/5ML syrup      2  Flu-like symptoms        3  Chills (without fever)  Covid/Flu-Office Collect          Suspected viral illness  Given education on supportive measures for symptoms in discharge instructions  Follow up with primary care in 3-5 days  Go to ER if symptoms get worse  Patient Instructions     --Rest, drink plenty of fluids     --For nasal/sinus congestion, you can try steam, warm compresses, saline nasal spray or Neti pot  Other medication options include: nasal steroid (Flonase, Nasocort) to be used at bedtime after the saline nasal spray or nasal decongestant (Afrin, Joseph-synephrine - for 3 days max or you can get rebound symptoms) may be taken  Can also decongestant (such as Sudafed) if > 10years of age and no history of high blood pressure  --For nasal drainage, postnasal drip, sneezing and itching, an OTC antihistamine (Allegra, Benadryl, etc) can be taken  --For cough, you can take an OTC expectorant such as plain Robitussion or Mucinex (active ingredient guaifenesin)  A spoonful of honey at bedtime may also be helpful  Also recommended is the use of a cool mist humidifier (with or without Vicks) in the bedroom at night  --For sore throat, you can take OTC lozenges, use warm gargles (salt water or apple cider vinegar and honey), herbal teas, or an OTC throat spray (Chloraseptic)  --You can take Tylenol or Motrin/Advil as needed for fever, headache, body aches   Motrin/Advil should be avoided, however, if you have a history of heart disease, bleeding ulcers, or if you take blood thinners      -For cold symptoms with high blood pressure you can try Coricidin cough/cold  --You should contact your primary care provider and/or go to the ER if your symptoms are not improved or get worse over the next 7 days  This includes new onset fever, localized ear pain, sinus pain, as well as worsening cough, chest pain, shortness of breath, or significant weakness/fatigue  Chief Complaint     Chief Complaint   Patient presents with   • Cold Like Symptoms     C/o chest congestion, sob, body aches, and night sweats  Did not take temp  Taking mucinex, verbalizes chest congestion is worse  However other symptoms are improving  History of Present Illness       Presents with 3 days of sick symptoms including chest congestion, cough, shortness of breath with activity/coughing, and night sweats  Taking muxinex and multisymptom cold medication with overall improvement in symptoms  Does have asthma history, inhaler has been helping  He feels he is wheezing at night and having trouble sleeping  His daughter also had sick symptoms just before he began getting sick  Review of Systems   Review of Systems   Constitutional: Positive for chills and fatigue  Negative for fever  HENT: Negative for ear pain and sore throat  Respiratory: Positive for cough, chest tightness, shortness of breath and wheezing  Cardiovascular: Negative for chest pain and palpitations  Gastrointestinal: Negative for diarrhea, nausea and vomiting  Musculoskeletal: Positive for myalgias  Skin: Negative for color change and rash  Psychiatric/Behavioral: Negative for confusion  All other systems reviewed and are negative          Current Medications       Current Outpatient Medications:   •  Acetaminophen (TYLENOL PO), Take by mouth, Disp: , Rfl:   •  amLODIPine (NORVASC) 10 mg tablet, Daily, Disp: , Rfl:   •  brompheniramine-pseudoephedrine-DM 30-2-10 MG/5ML syrup, Take 5 mL by mouth 4 (four) times a day as needed for allergies, Disp: 120 mL, Rfl: 0  •  Ibuprofen (MOTRIN PO), Take by mouth, Disp: , Rfl:   •  lisinopril (ZESTRIL) 40 mg tablet, , Disp: , Rfl:   •  Multiple Vitamin (MULTIVITAMIN ADULT PO), Take by mouth, Disp: , Rfl:   •  predniSONE 20 mg tablet, Take 2 tablets (40 mg total) by mouth daily for 5 days, Disp: 10 tablet, Rfl: 0  •  sildenafil (VIAGRA) 100 mg tablet, sildenafil 100 mg tablet  take 1 tablet by mouth PRIOR TO ACTIVITY, Disp: , Rfl:   •  gabapentin (NEURONTIN) 300 mg capsule, Take 1 capsule (300 mg total) by mouth daily At bedtime (Patient not taking: Reported on 8/5/2020), Disp: 30 capsule, Rfl: 1    Current Allergies     Allergies as of 11/30/2022   • (No Known Allergies)            The following portions of the patient's history were reviewed and updated as appropriate: allergies, current medications, past family history, past medical history, past social history, past surgical history and problem list      Past Medical History:   Diagnosis Date   • Asthma     childhood   • Hypertension    • Kidney stone        Past Surgical History:   Procedure Laterality Date   • COLONOSCOPY     • HEMORRHOID SURGERY     • KNEE SURGERY Left    • [place] CYSTO/URETERO W/LITHOTRIPSY &INDWELL STENT INSRT Right 06/08/2017    Procedure: CYSTOSCOPY; URETEROSCOPY; RETROGRADE PYELOGRAM; HOLMIUM LASER LITHOTRIPSY; BASKET STONE EXTRACTION; STENT PLACEMENT ;  Surgeon: Yojana Dorantes MD;  [facility];  Service: Urology   • [place] REINSERT BI/TRICEPS TENDON,DISTAL Left 01/08/2020    Procedure: REPAIR TENDON BICEPS left;  Surgeon: Soy Goins MD;  [facility];  Service: Orthopedics   • TREATMENT FISTULA ANAL         Family History   Problem Relation Age of Onset   • No Known Problems Mother          Medications have been verified  Objective   Pulse 68   Temp 97 7 °F (36 5 °C)   Resp 20   SpO2 95%        Physical Exam     Physical Exam  Vitals reviewed  Constitutional:       General: He is not in acute distress  Appearance: Normal appearance     HENT:      Right Ear: Tympanic membrane, ear canal and external ear normal       Left Ear: Tympanic membrane, ear canal and external ear normal       Nose: Nose normal       Mouth/Throat:      Mouth: Mucous membranes are moist       Pharynx: No posterior oropharyngeal erythema  Eyes:      Conjunctiva/sclera: Conjunctivae normal    Cardiovascular:      Rate and Rhythm: Normal rate and regular rhythm  Pulses: Normal pulses  Heart sounds: Normal heart sounds  No murmur heard  Pulmonary:      Effort: Pulmonary effort is normal  No respiratory distress  Breath sounds: Wheezing (with coughing episode, clears after) present  Skin:     General: Skin is warm and dry  Neurological:      General: No focal deficit present  Mental Status: He is alert and oriented to person, place, and time     Psychiatric:         Mood and Affect: Mood normal          Behavior: Behavior normal

## 2022-11-30 NOTE — LETTER
November 30, 2022     Patient: Bobbi Quintanilla   YOB: 1975   Date of Visit: 11/30/2022       To Whom it May Concern:    Bobbi Quintanilla was seen in my clinic on 11/30/2022  He should be excused 11/30/22  If you have any questions or concerns, please don't hesitate to call           Sincerely,          EDY Schuler        CC: No Recipients

## 2022-11-30 NOTE — PATIENT INSTRUCTIONS
--Rest, drink plenty of fluids      --For nasal/sinus congestion, you can try steam, warm compresses, saline nasal spray or Neti pot  Other medication options include: nasal steroid (Flonase, Nasocort) to be used at bedtime after the saline nasal spray or nasal decongestant (Afrin, Joseph-synephrine - for 3 days max or you can get rebound symptoms) may be taken  Can also decongestant (such as Sudafed) if > 10years of age and no history of high blood pressure  --For nasal drainage, postnasal drip, sneezing and itching, an OTC antihistamine (Allegra, Benadryl, etc) can be taken  --For cough, you can take an OTC expectorant such as plain Robitussion or Mucinex (active ingredient guaifenesin)  A spoonful of honey at bedtime may also be helpful  Also recommended is the use of a cool mist humidifier (with or without Vicks) in the bedroom at night  --For sore throat, you can take OTC lozenges, use warm gargles (salt water or apple cider vinegar and honey), herbal teas, or an OTC throat spray (Chloraseptic)  --You can take Tylenol or Motrin/Advil as needed for fever, headache, body aches  Motrin/Advil should be avoided, however, if you have a history of heart disease, bleeding ulcers, or if you take blood thinners       -For cold symptoms with high blood pressure you can try Coricidin cough/cold  --You should contact your primary care provider and/or go to the ER if your symptoms are not improved or get worse over the next 7 days  This includes new onset fever, localized ear pain, sinus pain, as well as worsening cough, chest pain, shortness of breath, or significant weakness/fatigue

## 2022-12-01 LAB
FLUAV RNA RESP QL NAA+PROBE: POSITIVE
FLUBV RNA RESP QL NAA+PROBE: NEGATIVE
SARS-COV-2 RNA RESP QL NAA+PROBE: NEGATIVE

## 2022-12-02 NOTE — RESULT ENCOUNTER NOTE
Your Flu test came back positive  You do have the Flu  Continue care with over the counter medications to help you get through the illness  Once you are fever free for 24 hours and overall feeling better you can return to work/school  I hope you feel better soon! Yes

## 2023-02-23 RX ORDER — OXYCODONE HYDROCHLORIDE 5 MG/1
TABLET ORAL
COMMUNITY

## 2023-02-23 RX ORDER — ACETAMINOPHEN/DIPHENHYDRAMINE 500MG-25MG
1 TABLET ORAL DAILY
COMMUNITY
End: 2023-02-24

## 2023-02-23 RX ORDER — NAPROXEN 500 MG/1
500 TABLET ORAL EVERY 12 HOURS PRN
COMMUNITY
Start: 2023-01-03 | End: 2024-01-03

## 2023-02-23 RX ORDER — ALBUTEROL SULFATE 90 UG/1
AEROSOL, METERED RESPIRATORY (INHALATION)
COMMUNITY

## 2023-02-23 RX ORDER — GLUCOSAMINE HCL 500 MG
TABLET ORAL
COMMUNITY

## 2023-02-23 RX ORDER — VARDENAFIL HYDROCHLORIDE 20 MG/1
TABLET ORAL
COMMUNITY

## 2023-02-23 RX ORDER — TRAMADOL HYDROCHLORIDE 50 MG/1
TABLET ORAL
COMMUNITY

## 2023-02-24 ENCOUNTER — OFFICE VISIT (OUTPATIENT)
Dept: GASTROENTEROLOGY | Facility: CLINIC | Age: 48
End: 2023-02-24

## 2023-02-24 VITALS
HEIGHT: 70 IN | BODY MASS INDEX: 34.93 KG/M2 | OXYGEN SATURATION: 95 % | WEIGHT: 244 LBS | SYSTOLIC BLOOD PRESSURE: 144 MMHG | DIASTOLIC BLOOD PRESSURE: 84 MMHG | HEART RATE: 73 BPM

## 2023-02-24 DIAGNOSIS — R14.0 BLOATING: ICD-10-CM

## 2023-02-24 DIAGNOSIS — R10.84 GENERALIZED ABDOMINAL PAIN: Primary | ICD-10-CM

## 2023-02-24 DIAGNOSIS — K59.04 CHRONIC IDIOPATHIC CONSTIPATION: ICD-10-CM

## 2023-02-24 RX ORDER — OXYCODONE HYDROCHLORIDE AND ACETAMINOPHEN 5; 325 MG/1; MG/1
TABLET ORAL
COMMUNITY
Start: 2023-01-03

## 2023-02-24 RX ORDER — METHOCARBAMOL 750 MG/1
750 TABLET, FILM COATED ORAL 3 TIMES DAILY PRN
COMMUNITY
Start: 2023-01-03

## 2023-02-24 NOTE — PROGRESS NOTES
[facility] - Outpatient Consultation  Michelle Kramer 49 y o  male MRN: [id number]  Encounter: [id number]          ASSESSMENT AND PLAN:      1  Generalized abdominal pain  2  Bloating  3  Constipation  If he drinks beer or eats certain grains  Other days he feels well  Ongoing since the fall of last year  He is taking probiotics with questionable benefit although he is not consistent with this  Labs  EGD  Colonoscopy    ______________________________________________________________________    HPI: 76-year-old male with a history of diverticulosis, hemorrhoids status post hemorrhoidectomy and perianal abscess status post incision and drainage who presents for evaluation of abdominal pain, bloating and constipation  He reports that the symptoms of been occurring intermittently over the past 6 months  He seems to relate them to when he took Paxlovid for COVID  He admits that the symptoms were occurring prior to that but that is when they worsened  He initially noted that he could not drink beer without the symptom  Recently he has noted that a lot of grains seem to give him the problem  He spoke with a friend who advised him to start taking a probiotic  He has been doing this but it is close to be dosed 3 times a day and he admits that he misses doses  He reports that the bloating that he gets is generalized  He feels very uncomfortable until he is able to have a good bowel movement  There is no rectal bleeding  He has been gaining some weight but relates this to not being active after getting hurt on the job  He has no heartburn, nausea or vomiting  He denies any family history of any serious gastrointestinal disorders including celiac  He reports he did a colonoscopy about 7 years ago due to hemorrhoids  He believes that this only showed diverticulosis  Since that time he has been relatively cautious to not overeat nuts and seeds        REVIEW OF SYSTEMS:    CONSTITUTIONAL: Denies any fever, chills, rigors, and weight loss  HEENT: No earache or tinnitus  Denies hearing loss or visual disturbances  CARDIOVASCULAR: No chest pain or palpitations  RESPIRATORY: Denies any cough, hemoptysis, shortness of breath or dyspnea on exertion  GASTROINTESTINAL: As noted in the History of Present Illness  GENITOURINARY: No problems with urination  Denies any hematuria or dysuria  NEUROLOGIC: No dizziness or vertigo, denies headaches  MUSCULOSKELETAL: Denies any muscle or joint pain  SKIN: Denies skin rashes or itching  ENDOCRINE: Denies excessive thirst  Denies intolerance to heat or cold  PSYCHOSOCIAL: Denies depression or anxiety  Denies any recent memory loss  Historical Information   Past Medical History:   Diagnosis Date   • Asthma     childhood   • Hypertension    • Kidney stone      Past Surgical History:   Procedure Laterality Date   • COLONOSCOPY     • HEMORRHOID SURGERY     • KNEE SURGERY Left    • [place] CYSTO/URETERO W/LITHOTRIPSY &INDWELL STENT INSRT Right 2017    Procedure: CYSTOSCOPY; URETEROSCOPY; RETROGRADE PYELOGRAM; HOLMIUM LASER LITHOTRIPSY; BASKET STONE EXTRACTION; STENT PLACEMENT ;  Surgeon: Philip Harper MD;  [facility];  Service: Urology   • [place] RINSJ RPTD BICEPS/TRICEPS TDN DSTL W/WO TDN GRF Left 2020    Procedure: REPAIR TENDON BICEPS left;  Surgeon: Maude Taylor MD;  [facility];  Service: Orthopedics   • TREATMENT FISTULA ANAL       Social History   Social History     Substance and Sexual Activity   Alcohol Use Not Currently   • Alcohol/week: 6 0 standard drinks   • Types: 6 Cans of beer per week    Comment: 6 per week     Social History     Substance and Sexual Activity   Drug Use No     Social History     Tobacco Use   Smoking Status Former   • Types: Cigarettes   • Quit date:    • Years since quittin 1   Smokeless Tobacco Never   Tobacco Comments    quit when 24 y o       Family History   Problem Relation Age of Onset   • No Known Problems Mother        Meds/Allergies       Current Outpatient Medications:   •  Acetaminophen (TYLENOL PO)  •  albuterol (PROVENTIL HFA,VENTOLIN HFA) 90 mcg/act inhaler  •  Coenzyme Q10 100 MG TABS  •  Ibuprofen (MOTRIN PO)  •  lisinopril (ZESTRIL) 40 mg tablet  •  MAGNESIUM PO  •  methocarbamol (ROBAXIN) 750 mg tablet  •  Multiple Vitamin (MULTIVITAMIN ADULT PO)  •  naproxen (NAPROSYN) 500 mg tablet  •  oxyCODONE (ROXICODONE) 5 immediate release tablet  •  oxyCODONE-acetaminophen (PERCOCET) 5-325 mg per tablet  •  sildenafil (VIAGRA) 100 mg tablet  •  traMADol (ULTRAM) 50 mg tablet  •  vardenafil (LEVITRA) 20 MG tablet    No Known Allergies        Objective     Blood pressure 144/84, pulse 73, height 5' 10" (1 778 m), weight 111 kg (244 lb), SpO2 95 %  Body mass index is 35 01 kg/m²  PHYSICAL EXAM:      General Appearance:   Alert, cooperative, no distress   HEENT:   Normocephalic, atraumatic, anicteric      Neck:  Supple, symmetrical, trachea midline   Lungs:   Clear to auscultation bilaterally; no rales, rhonchi or wheezing; respirations unlabored    Heart[de-identified]   Regular rate and rhythm; no murmur, rub, or gallop  Abdomen:   Soft, non-tender, non-distended; normal bowel sounds; no masses, no organomegaly    Genitalia:   Deferred    Rectal:   Deferred    Extremities:  No cyanosis, clubbing or edema    Pulses:  2+ and symmetric    Skin:  No jaundice, rashes, or lesions    Lymph nodes:  No palpable cervical lymphadenopathy        Lab Results:   No visits with results within 1 Day(s) from this visit  Latest known visit with results is:   Office Visit on 11/30/2022   Component Date Value   • SARS-CoV-2 11/30/2022 Negative    • INFLUENZA A PCR 11/30/2022 Positive (A)    • INFLUENZA B PCR 11/30/2022 Negative          Radiology Results:   No results found

## 2023-02-24 NOTE — PATIENT INSTRUCTIONS
Scheduled date of EGD/colonoscopy (as of today):5/22/23  Physician performing EGD/colonoscopy:Vincent  Location of EGD/colonoscopy:[facility] bowel prep reviewed with patient:miralax/dulcolax  Instructions reviewed with patient by:Cindi TORRES  Clearances:   none

## 2023-02-25 ENCOUNTER — APPOINTMENT (OUTPATIENT)
Dept: LAB | Facility: CLINIC | Age: 48
End: 2023-02-25

## 2023-02-25 DIAGNOSIS — R10.84 GENERALIZED ABDOMINAL PAIN: ICD-10-CM

## 2023-02-25 DIAGNOSIS — K59.04 CHRONIC IDIOPATHIC CONSTIPATION: ICD-10-CM

## 2023-02-25 DIAGNOSIS — R14.0 BLOATING: ICD-10-CM

## 2023-02-25 LAB
ALBUMIN SERPL BCP-MCNC: 3.9 G/DL (ref 3.5–5)
ALP SERPL-CCNC: 52 U/L (ref 46–116)
ALT SERPL W P-5'-P-CCNC: 56 U/L (ref 12–78)
ANION GAP SERPL CALCULATED.3IONS-SCNC: 3 MMOL/L (ref 4–13)
AST SERPL W P-5'-P-CCNC: 29 U/L (ref 5–45)
BASOPHILS # BLD AUTO: 0.09 THOUSANDS/ÂΜL (ref 0–0.1)
BASOPHILS NFR BLD AUTO: 1 % (ref 0–1)
BILIRUB SERPL-MCNC: 0.63 MG/DL (ref 0.2–1)
BUN SERPL-MCNC: 18 MG/DL (ref 5–25)
CALCIUM SERPL-MCNC: 9.5 MG/DL (ref 8.3–10.1)
CHLORIDE SERPL-SCNC: 108 MMOL/L (ref 96–108)
CHOLEST SERPL-MCNC: 109 MG/DL
CO2 SERPL-SCNC: 28 MMOL/L (ref 21–32)
CREAT SERPL-MCNC: 0.72 MG/DL (ref 0.6–1.3)
EOSINOPHIL # BLD AUTO: 0.2 THOUSAND/ÂΜL (ref 0–0.61)
EOSINOPHIL NFR BLD AUTO: 2 % (ref 0–6)
ERYTHROCYTE [DISTWIDTH] IN BLOOD BY AUTOMATED COUNT: 13.7 % (ref 11.6–15.1)
ERYTHROCYTE [SEDIMENTATION RATE] IN BLOOD: 14 MM/HOUR (ref 0–14)
GFR SERPL CREATININE-BSD FRML MDRD: 110 ML/MIN/1.73SQ M
GLUCOSE P FAST SERPL-MCNC: 103 MG/DL (ref 65–99)
HCT VFR BLD AUTO: 49 % (ref 36.5–49.3)
HDLC SERPL-MCNC: 41 MG/DL
HGB BLD-MCNC: 15 G/DL (ref 12–17)
IMM GRANULOCYTES # BLD AUTO: 0.03 THOUSAND/UL (ref 0–0.2)
IMM GRANULOCYTES NFR BLD AUTO: 0 % (ref 0–2)
LDLC SERPL CALC-MCNC: 58 MG/DL (ref 0–100)
LYMPHOCYTES # BLD AUTO: 2.76 THOUSANDS/ÂΜL (ref 0.6–4.47)
LYMPHOCYTES NFR BLD AUTO: 31 % (ref 14–44)
MCH RBC QN AUTO: 27.8 PG (ref 26.8–34.3)
MCHC RBC AUTO-ENTMCNC: 30.6 G/DL (ref 31.4–37.4)
MCV RBC AUTO: 91 FL (ref 82–98)
MONOCYTES # BLD AUTO: 0.85 THOUSAND/ÂΜL (ref 0.17–1.22)
MONOCYTES NFR BLD AUTO: 10 % (ref 4–12)
NEUTROPHILS # BLD AUTO: 4.99 THOUSANDS/ÂΜL (ref 1.85–7.62)
NEUTS SEG NFR BLD AUTO: 56 % (ref 43–75)
NONHDLC SERPL-MCNC: 68 MG/DL
NRBC BLD AUTO-RTO: 0 /100 WBCS
PLATELET # BLD AUTO: 313 THOUSANDS/UL (ref 149–390)
PMV BLD AUTO: 11.2 FL (ref 8.9–12.7)
POTASSIUM SERPL-SCNC: 4.5 MMOL/L (ref 3.5–5.3)
PROT SERPL-MCNC: 7.3 G/DL (ref 6.4–8.4)
RBC # BLD AUTO: 5.4 MILLION/UL (ref 3.88–5.62)
SODIUM SERPL-SCNC: 139 MMOL/L (ref 135–147)
TRIGL SERPL-MCNC: 48 MG/DL
TSH SERPL DL<=0.05 MIU/L-ACNC: 2.77 UIU/ML (ref 0.45–4.5)
WBC # BLD AUTO: 8.92 THOUSAND/UL (ref 4.31–10.16)

## 2023-02-27 LAB
ENDOMYSIUM IGA SER QL: NEGATIVE
GLIADIN PEPTIDE IGA SER-ACNC: 4 UNITS (ref 0–19)
GLIADIN PEPTIDE IGG SER-ACNC: 5 UNITS (ref 0–19)
IGA SERPL-MCNC: 435 MG/DL (ref 90–386)
TTG IGA SER-ACNC: <2 U/ML (ref 0–3)
TTG IGG SER-ACNC: <2 U/ML (ref 0–5)

## 2023-03-23 ENCOUNTER — APPOINTMENT (OUTPATIENT)
Dept: URGENT CARE | Age: 48
End: 2023-03-23

## 2023-05-22 ENCOUNTER — ANESTHESIA EVENT (OUTPATIENT)
Dept: GASTROENTEROLOGY | Facility: HOSPITAL | Age: 48
End: 2023-05-22

## 2023-05-22 ENCOUNTER — ANESTHESIA (OUTPATIENT)
Dept: GASTROENTEROLOGY | Facility: HOSPITAL | Age: 48
End: 2023-05-22

## 2023-05-22 ENCOUNTER — HOSPITAL ENCOUNTER (OUTPATIENT)
Dept: GASTROENTEROLOGY | Facility: HOSPITAL | Age: 48
Setting detail: OUTPATIENT SURGERY
Discharge: HOME | End: 2023-05-22

## 2023-05-22 VITALS
BODY MASS INDEX: 32.32 KG/M2 | RESPIRATION RATE: 18 BRPM | OXYGEN SATURATION: 94 % | SYSTOLIC BLOOD PRESSURE: 122 MMHG | WEIGHT: 225.75 LBS | HEIGHT: 70 IN | HEART RATE: 69 BPM | TEMPERATURE: 97.5 F | DIASTOLIC BLOOD PRESSURE: 79 MMHG

## 2023-05-22 DIAGNOSIS — K59.04 CHRONIC IDIOPATHIC CONSTIPATION: ICD-10-CM

## 2023-05-22 DIAGNOSIS — R10.84 GENERALIZED ABDOMINAL PAIN: ICD-10-CM

## 2023-05-22 DIAGNOSIS — R14.0 BLOATING: ICD-10-CM

## 2023-05-22 RX ORDER — GLYCOPYRROLATE 0.2 MG/ML
INJECTION INTRAMUSCULAR; INTRAVENOUS AS NEEDED
Status: DISCONTINUED | OUTPATIENT
Start: 2023-05-22 | End: 2023-05-22

## 2023-05-22 RX ORDER — LIDOCAINE HYDROCHLORIDE 20 MG/ML
INJECTION, SOLUTION EPIDURAL; INFILTRATION; INTRACAUDAL; PERINEURAL AS NEEDED
Status: DISCONTINUED | OUTPATIENT
Start: 2023-05-22 | End: 2023-05-22

## 2023-05-22 RX ORDER — PROPOFOL 10 MG/ML
INJECTION, EMULSION INTRAVENOUS AS NEEDED
Status: DISCONTINUED | OUTPATIENT
Start: 2023-05-22 | End: 2023-05-22

## 2023-05-22 RX ORDER — KETAMINE HCL IN NACL, ISO-OSM 100MG/10ML
SYRINGE (ML) INJECTION AS NEEDED
Status: DISCONTINUED | OUTPATIENT
Start: 2023-05-22 | End: 2023-05-22

## 2023-05-22 RX ORDER — SODIUM CHLORIDE, SODIUM LACTATE, POTASSIUM CHLORIDE, CALCIUM CHLORIDE 600; 310; 30; 20 MG/100ML; MG/100ML; MG/100ML; MG/100ML
125 INJECTION, SOLUTION INTRAVENOUS CONTINUOUS
Status: DISCONTINUED | OUTPATIENT
Start: 2023-05-22 | End: 2023-05-26 | Stop reason: HOSPADM

## 2023-05-22 RX ORDER — SODIUM CHLORIDE, SODIUM LACTATE, POTASSIUM CHLORIDE, CALCIUM CHLORIDE 600; 310; 30; 20 MG/100ML; MG/100ML; MG/100ML; MG/100ML
125 INJECTION, SOLUTION INTRAVENOUS CONTINUOUS
Status: CANCELLED | OUTPATIENT
Start: 2023-05-22

## 2023-05-22 RX ADMIN — GLYCOPYRROLATE 0.2 MCG: 0.2 INJECTION, SOLUTION INTRAMUSCULAR; INTRAVENOUS at 10:22

## 2023-05-22 RX ADMIN — LIDOCAINE HYDROCHLORIDE 100 MG: 20 INJECTION, SOLUTION EPIDURAL; INFILTRATION; INTRACAUDAL at 10:26

## 2023-05-22 RX ADMIN — PROPOFOL 20 MG: 10 INJECTION, EMULSION INTRAVENOUS at 10:30

## 2023-05-22 RX ADMIN — Medication 10 MG: at 10:29

## 2023-05-22 RX ADMIN — Medication 10 MG: at 10:31

## 2023-05-22 RX ADMIN — Medication 10 MG: at 10:27

## 2023-05-22 RX ADMIN — SODIUM CHLORIDE, SODIUM LACTATE, POTASSIUM CHLORIDE, AND CALCIUM CHLORIDE: .6; .31; .03; .02 INJECTION, SOLUTION INTRAVENOUS at 10:06

## 2023-05-22 RX ADMIN — PROPOFOL 30 MG: 10 INJECTION, EMULSION INTRAVENOUS at 10:28

## 2023-05-22 RX ADMIN — PROPOFOL 50 MG: 10 INJECTION, EMULSION INTRAVENOUS at 10:32

## 2023-05-22 RX ADMIN — Medication 10 MG: at 10:26

## 2023-05-22 RX ADMIN — PROPOFOL 50 MG: 10 INJECTION, EMULSION INTRAVENOUS at 10:39

## 2023-05-22 RX ADMIN — PROPOFOL 20 MG: 10 INJECTION, EMULSION INTRAVENOUS at 10:46

## 2023-05-22 RX ADMIN — PROPOFOL 50 MG: 10 INJECTION, EMULSION INTRAVENOUS at 10:27

## 2023-05-22 RX ADMIN — PROPOFOL 30 MG: 10 INJECTION, EMULSION INTRAVENOUS at 10:43

## 2023-05-22 RX ADMIN — PROPOFOL 150 MG: 10 INJECTION, EMULSION INTRAVENOUS at 10:26

## 2023-05-22 NOTE — ANESTHESIA PREPROCEDURE EVALUATION
Procedure:  COLONOSCOPY  EGD          1  Generalized abdominal pain  2  Bloating  3  Constipation  If he drinks beer or eats certain grains  Other days he feels well  Ongoing since the fall of last year  He is taking probiotics with questionable benefit although he is not consistent with this  Labs  EGD  Colonoscopy     ______________________________________________________________________     HPI: 50-year-old male with a history of diverticulosis, hemorrhoids status post hemorrhoidectomy and perianal abscess status post incision and drainage who presents for evaluation of abdominal pain, bloating and constipation  He reports that the symptoms of been occurring intermittently over the past 6 months  He seems to relate them to when he took Paxlovid for COVID  He admits that the symptoms were occurring prior to that but that is when they worsened  He initially noted that he could not drink beer without the symptom  Recently he has noted that a lot of grains seem to give him the problem  He spoke with a friend who advised him to start taking a probiotic  He has been doing this but it is close to be dosed 3 times a day and he admits that he misses doses  He reports that the bloating that he gets is generalized  He feels very uncomfortable until he is able to have a good bowel movement  There is no rectal bleeding  He has been gaining some weight but relates this to not being active after getting hurt on the job  He has no heartburn, nausea or vomiting  He denies any family history of any serious gastrointestinal disorders including celiac  He reports he did a colonoscopy about 7 years ago due to hemorrhoids  He believes that this only showed diverticulosis    Since that time he has been relatively cautious to not overeat nuts and seeds      Physical Exam    Airway    Mallampati score: III  TM Distance: >3 FB  Neck ROM: full     Dental       Cardiovascular  Cardiovascular exam normal    Pulmonary  Pulmonary exam normal     Other Findings        Anesthesia Plan  ASA Score- 2     Anesthesia Type- IV sedation with anesthesia with ASA Monitors  Additional Monitors:   Airway Plan:           Plan Factors-Exercise tolerance (METS): >4 METS  Chart reviewed  Existing labs reviewed  Patient summary reviewed  Patient is not a current smoker  Induction- intravenous  Postoperative Plan-     Informed Consent- Anesthetic plan and risks discussed with patient  I personally reviewed this patient with the CRNA  Discussed and agreed on the Anesthesia Plan with the CRNA  Demi Zambrano History Comments History Comments   Asthma childhood Hypertension    Kidney stone        Surgical History     Current as of 23 0959  KNEE SURGERY HEMORRHOID SURGERY   COLONOSCOPY CA CYSTO/URETERO W/LITHOTRIPSY &INDWELL STENT INSRT   CA RINSJ RPTD BICEPS/TRICEPS TDN DSTL W/WO TDN GRF TREATMENT FISTULA ANAL     Substance History     Current as of 23 0959  Smoking Status: Former   Quit Smokin99   Smokeless Tobacco Status: Never   Alcohol use: Not Currently   Drug use:  No

## 2023-05-22 NOTE — H&P
"History and Physical -  [facility]  Justice Springer 49 y o  male MRN: [id number]      HPI: Justice Springer is a 49y o  year old male who presents for evaluation of generalized abdominal pain and bloating      REVIEW OF SYSTEMS: Per the HPI, and otherwise unremarkable  Historical Information   Past Medical History:   Diagnosis Date   • Asthma     childhood   • Hypertension    • Kidney stone      Past Surgical History:   Procedure Laterality Date   • COLONOSCOPY     • HEMORRHOID SURGERY     • KNEE SURGERY Left    • [place] CYSTO/URETERO W/LITHOTRIPSY &INDWELL STENT INSRT Right 2017    Procedure: CYSTOSCOPY; URETEROSCOPY; RETROGRADE PYELOGRAM; HOLMIUM LASER LITHOTRIPSY; BASKET STONE EXTRACTION; STENT PLACEMENT ;  Surgeon: Richard Irby MD;  [facility];  Service: Urology   • [place] RINSJ RPTD BICEPS/TRICEPS TDN DSTL W/WO TDN GRF Left 2020    Procedure: REPAIR TENDON BICEPS left;  Surgeon: Goldie Chong MD;  Location: [facility];  Service: Orthopedics   • TREATMENT FISTULA ANAL       Social History   Social History     Substance and Sexual Activity   Alcohol Use Not Currently   • Alcohol/week: 6 0 standard drinks   • Types: 6 Cans of beer per week    Comment: 6 per week     Social History     Substance and Sexual Activity   Drug Use No     Social History     Tobacco Use   Smoking Status Former   • Types: Cigarettes   • Quit date:    • Years since quittin 4   Smokeless Tobacco Never   Tobacco Comments    quit when 24 y o  Family History   Problem Relation Age of Onset   • No Known Problems Mother        Meds/Allergies     (Not in a hospital admission)      No Known Allergies    Objective     Blood pressure 155/92, pulse 70, temperature 97 9 °F (36 6 °C), temperature source Temporal, resp  rate 19, height 5' 10\" (1 778 m), weight 102 kg (225 lb 12 oz), SpO2 97 %        PHYSICAL EXAM    Gen: NAD  CV: RRR  CHEST: Clear  ABD: soft, NT/ND  EXT: no edema      ASSESSMENT/PLAN:  " This is a 49y o  year old male here for EGD with biopsies, colonoscopy, and he is stable and optimized for his procedure

## 2023-05-22 NOTE — ANESTHESIA POSTPROCEDURE EVALUATION
Post-Op Assessment Note    CV Status:  Stable  Pain Score: 0    Pain management: adequate     Mental Status:  Sleepy and arousable   Hydration Status:  Stable   PONV Controlled:  None   Airway Patency:  Patent      Post Op Vitals Reviewed: Yes      Staff: CRNA         No notable events documented      /74 (05/22/23 1053)    Temp 97 5 °F (36 4 °C) (05/22/23 1053)    Pulse 78 (05/22/23 1053)   Resp 16 (05/22/23 1053)    SpO2 92 % (05/22/23 1053)

## 2023-05-30 ENCOUNTER — TELEPHONE (OUTPATIENT)
Dept: GASTROENTEROLOGY | Facility: CLINIC | Age: 48
End: 2023-05-30

## 2023-05-30 NOTE — TELEPHONE ENCOUNTER
----- Message from Bertram Davies DO sent at 5/30/2023 10:50 AM EDT -----  Please call the patient with the biopsy results  Biopsies of small intestine were benign and negative for celiac disease or cancer  Biopsies of stomach were benign and negative for Helicobacter pylori or for cancer

## 2023-05-30 NOTE — TELEPHONE ENCOUNTER
Called and spoke to patient  Gave patient test results   Patient voiced understanding an had no further questions or concerns

## 2023-07-11 ENCOUNTER — OFFICE VISIT (OUTPATIENT)
Dept: FAMILY MEDICINE CLINIC | Facility: CLINIC | Age: 48
End: 2023-07-11
Payer: COMMERCIAL

## 2023-07-11 VITALS
DIASTOLIC BLOOD PRESSURE: 88 MMHG | HEIGHT: 70 IN | RESPIRATION RATE: 18 BRPM | HEART RATE: 67 BPM | BODY MASS INDEX: 32.87 KG/M2 | OXYGEN SATURATION: 98 % | TEMPERATURE: 97.8 F | SYSTOLIC BLOOD PRESSURE: 138 MMHG | WEIGHT: 229.6 LBS

## 2023-07-11 DIAGNOSIS — I10 BENIGN HYPERTENSION: ICD-10-CM

## 2023-07-11 DIAGNOSIS — Z00.00 ENCOUNTER FOR WELL ADULT EXAM WITHOUT ABNORMAL FINDINGS: Primary | ICD-10-CM

## 2023-07-11 DIAGNOSIS — L91.8 SKIN TAG: ICD-10-CM

## 2023-07-11 PROBLEM — K57.30 DIVERTICULOSIS OF COLON: Status: ACTIVE | Noted: 2023-06-06

## 2023-07-11 PROBLEM — N52.8 OTHER MALE ERECTILE DYSFUNCTION: Status: ACTIVE | Noted: 2023-07-11

## 2023-07-11 PROCEDURE — 99386 PREV VISIT NEW AGE 40-64: CPT | Performed by: NURSE PRACTITIONER

## 2023-07-11 RX ORDER — LISINOPRIL 40 MG/1
40 TABLET ORAL DAILY
Qty: 90 TABLET | Refills: 0 | Status: SHIPPED | OUTPATIENT
Start: 2023-07-11

## 2023-07-11 RX ORDER — AMLODIPINE BESYLATE 10 MG/1
TABLET ORAL
COMMUNITY
Start: 2023-06-05 | End: 2023-07-11

## 2023-07-11 RX ORDER — BROMPHENIRAMINE MALEATE, PSEUDOEPHEDRINE HYDROCHLORIDE, AND DEXTROMETHORPHAN HYDROBROMIDE 2; 30; 10 MG/5ML; MG/5ML; MG/5ML
SYRUP ORAL
COMMUNITY
End: 2023-07-11

## 2023-07-11 RX ORDER — PREDNISONE 20 MG/1
TABLET ORAL
COMMUNITY
End: 2023-07-11

## 2023-07-11 NOTE — ASSESSMENT & PLAN NOTE
Continue working on lifestyle changes, would consider reduction in dose at next office visit depending on BP and weight

## 2023-07-11 NOTE — PROGRESS NOTES
BMI Counseling: Body mass index is 32.94 kg/m². The BMI is above normal. Nutrition recommendations include reducing portion sizes. Exercise recommendations include moderate aerobic physical activity for 150 minutes/week. ADULT ANNUAL [facility]    NAME: Melvin Mireles  AGE: 51 y.o. SEX: male  : 1975     DATE: 2023     Assessment and Plan:     Problem List Items Addressed This Visit        Cardiovascular and Mediastinum    Benign hypertension     Continue working on lifestyle changes, would consider reduction in dose at next office visit depending on BP and weight          Relevant Medications    lisinopril (ZESTRIL) 40 mg tablet   Other Visit Diagnoses     Encounter for well adult exam without abnormal findings    -  Primary    Relevant Orders    CBC and differential    Comprehensive metabolic panel    Lipid Panel with Direct LDL reflex    Skin tag        Relevant Orders    Ambulatory Referral to Dermatology          Immunizations and preventive care screenings were discussed with patient today. Appropriate education was printed on patient's after visit summary. Counseling:  Alcohol/drug use: discussed moderation in alcohol intake, the recommendations for healthy alcohol use, and avoidance of illicit drug use. Dental Health: discussed importance of regular tooth brushing, flossing, and dental visits. Injury prevention: discussed safety/seat belts, safety helmets, smoke detectors, carbon dioxide detectors, and smoking near bedding or upholstery. Sexual health: discussed sexually transmitted diseases, partner selection, use of condoms, avoidance of unintended pregnancy, and contraceptive alternatives. · Exercise: the importance of regular exercise/physical activity was discussed. Recommend exercise 3-5 times per week for at least 30 minutes. BMI Counseling: Body mass index is 32.94 kg/m².  The BMI is above normal. Nutrition recommendations include decreasing portion sizes. Exercise recommendations include moderate physical activity 150 minutes/week. Rationale for BMI follow-up plan is due to patient being overweight or obese. Depression Screening and Follow-up Plan: Patient was screened for depression during today's encounter. They screened negative with a PHQ-2 score of 0. Return in about 4 months (around 11/11/2023). Chief Complaint:     Chief Complaint   Patient presents with   • Establish Care      History of Present Illness:     Adult Annual Physical   Patient here for a comprehensive physical exam. The patient reports problems - wanting to get off blood pressure medication. He has been working on lifestyle changes. Diet and Physical Activity  · Diet/Nutrition: well balanced diet. · Exercise: walking. Depression Screening  PHQ-2/9 Depression Screening    Little interest or pleasure in doing things: 0 - not at all  Feeling down, depressed, or hopeless: 0 - not at all  PHQ-2 Score: 0  PHQ-2 Interpretation: Negative depression screen       General Health  · Sleep: sleeps well. · Hearing: normal - bilateral.  · Vision: wears glasses. · Dental:      Health  · Symptoms include: erectile dysfunction     Review of Systems:     Review of Systems   Constitutional: Negative for activity change, chills, fatigue and fever. HENT: Negative for congestion, ear discharge, ear pain, sinus pressure, sinus pain, sore throat, tinnitus and trouble swallowing. Eyes: Negative for photophobia, pain, discharge, itching and visual disturbance. Respiratory: Negative for cough, chest tightness, shortness of breath and wheezing. Cardiovascular: Negative for chest pain and leg swelling. Gastrointestinal: Negative for abdominal distention, abdominal pain, constipation, diarrhea, nausea and vomiting. Endocrine: Negative for polydipsia, polyphagia and polyuria.    Genitourinary: Negative for dysuria and frequency. Musculoskeletal: Negative for arthralgias, myalgias, neck pain and neck stiffness. Skin: Negative for color change. Neurological: Negative for dizziness, syncope, weakness, numbness and headaches. Hematological: Does not bruise/bleed easily. Psychiatric/Behavioral: Positive for sleep disturbance. Negative for behavioral problems, confusion, self-injury and suicidal ideas. The patient is not nervous/anxious. Past Medical History:     Past Medical History:   Diagnosis Date   • Asthma     childhood   • Hypertension    • Kidney stone       Past Surgical History:     Past Surgical History:   Procedure Laterality Date   • COLONOSCOPY     • HEMORRHOID SURGERY     • KNEE SURGERY Left    • CT CYSTO/URETERO W/LITHOTRIPSY &INDWELL STENT INSRT Right 2017    Procedure: CYSTOSCOPY; URETEROSCOPY; RETROGRADE PYELOGRAM; HOLMIUM LASER LITHOTRIPSY; BASKET STONE EXTRACTION; STENT PLACEMENT ;  Surgeon: Jose Alberto Vicente MD;  [facility];  Service: Urology   • CT RINSJ RPTD BICEPS/TRICEPS TDN DSTL W/WO TDN GRF Left 2020    Procedure: REPAIR TENDON BICEPS left;  Surgeon: Ppaa Costello MD;  [facility];  Service: Orthopedics   • TREATMENT FISTULA ANAL        Family History:     Family History   Problem Relation Age of Onset   • No Known Problems Mother       Social History:     Social History     Socioeconomic History   • Marital status: /Civil Union     Spouse name: None   • Number of children: None   • Years of education: None   • Highest education level: None   Occupational History   • None   Tobacco Use   • Smoking status: Former     Types: Cigarettes     Quit date:      Years since quittin.5   • Smokeless tobacco: Never   • Tobacco comments:     quit when 24 y. o.    Vaping Use   • Vaping Use: Never used   Substance and Sexual Activity   • Alcohol use: Not Currently     Alcohol/week: 6.0 standard drinks of alcohol     Types: 6 Cans of beer per week     Comment: 6 per week   • Drug use: No   • Sexual activity: None   Other Topics Concern   • None   Social History Narrative   • None     Social Determinants of Health     Financial Resource Strain: Not on file   Food Insecurity: Not on file   Transportation Needs: Not on file   Physical Activity: Not on file   Stress: Not on file   Social Connections: Not on file   Intimate Partner Violence: Not on file   Housing Stability: Not on file      Current Medications:     Current Outpatient Medications   Medication Sig Dispense Refill   • Acetaminophen (TYLENOL PO) Take by mouth     • Coenzyme Q10 100 MG TABS Take by mouth     • Ibuprofen (MOTRIN PO) Take by mouth     • lisinopril (ZESTRIL) 40 mg tablet Take 1 tablet (40 mg total) by mouth daily 90 tablet 0   • MAGNESIUM PO Take by mouth     • Multiple Vitamin (MULTIVITAMIN ADULT PO) Take by mouth     • naproxen (NAPROSYN) 500 mg tablet Take 500 mg by mouth every 12 (twelve) hours as needed     • sildenafil (VIAGRA) 100 mg tablet sildenafil 100 mg tablet   take 1 tablet by mouth PRIOR TO ACTIVITY     • vardenafil (LEVITRA) 20 MG tablet vardenafil 20 mg tablet   take 1 tablet by mouth if needed       No current facility-administered medications for this visit. Allergies:     No Known Allergies   Physical Exam:     /88 (BP Location: Right arm, Patient Position: Sitting, Cuff Size: Standard)   Pulse 67   Temp 97.8 °F (36.6 °C) (Tympanic)   Resp 18   Ht 5' 10" (1.778 m)   Wt 104 kg (229 lb 9.6 oz)   SpO2 98%   BMI 32.94 kg/m²     Physical Exam  Vitals and nursing note reviewed. Constitutional:       Appearance: He is well-developed. He is obese. HENT:      Head: Normocephalic and atraumatic. Right Ear: Tympanic membrane, ear canal and external ear normal.      Left Ear: Tympanic membrane, ear canal and external ear normal.      Nose: Nose normal. No congestion or rhinorrhea.       Mouth/Throat:      Mouth: Mucous membranes are moist.      Pharynx: No oropharyngeal exudate or posterior oropharyngeal erythema. Eyes:      General:         Right eye: No discharge. Left eye: No discharge. Conjunctiva/sclera: Conjunctivae normal.      Pupils: Pupils are equal, round, and reactive to light. Neck:      Thyroid: No thyromegaly. Cardiovascular:      Rate and Rhythm: Normal rate and regular rhythm. Pulses: Normal pulses. Heart sounds: Normal heart sounds. Pulmonary:      Effort: Pulmonary effort is normal.      Breath sounds: Normal breath sounds. No wheezing or rhonchi. Abdominal:      General: Bowel sounds are normal. There is no distension. Palpations: Abdomen is soft. Tenderness: There is no abdominal tenderness. Musculoskeletal:         General: No swelling, tenderness or deformity. Normal range of motion. Cervical back: Normal range of motion and neck supple. Right lower leg: No edema. Left lower leg: No edema. Skin:     General: Skin is warm and dry. Findings: Lesion present. No erythema or rash. Comments: Right forearm skin tag   Neurological:      General: No focal deficit present. Mental Status: He is alert and oriented to person, place, and time. Psychiatric:         Mood and Affect: Mood normal.         Behavior: Behavior normal.         Thought Content:  Thought content normal.         Judgment: Judgment normal.          Brunilda Alarcon, [address] [facility] [address]

## 2023-09-05 NOTE — PROGRESS NOTES
Referring Physician: EDY Winters  A copy of this consultation note was communicated to the referring physician. Problem List Items Addressed This Visit    None  Visit Diagnoses     Encounter for sterilization    -  Primary    Relevant Medications    ALPRAZolam (XANAX) 2 MG tablet              Assessment and plan:       We had a long discussion regarding the options for birth control. I told the patient that vasectomy is considered to be a permanent surgical sterilization procedure. We spoke about other options including the possibility of vasectomy reversal at a later time. He understands that vasectomy confers no immunity to STDs. I also told him that according to our present knowledge, there is no causal relationship between vasectomy and subsequent development of prostate cancer or testicular cancer. No change in libido erection or ejaculation. We spoke about the potential complications. The most common one in the short term is scrotal hematoma and infection, which rarely requires re-operation. Additionally, he can react to the anesthetic, develop scrotal swelling, have pain or skin bruising. We spoke about post procedure care to try to minimize this complication. I also asked him to refrain from aspirin or fish oil products and alcohol prior to the procedure. The long-term complications include but are not limited to vasectomy failure by recanalization, chronic epididymal discomfort, pain, among other possibilities. I described to him how this procedure is normally performed in an office setting and he understands that if anesthesia is desired, this can be performed for him in an outpatient operative setting. Finally, he understands that following vasectomy, he’ll need to use other means of birth control until he’s had semen analyses that demonstrate the absence of sperm.   He understands it will be his responsibility to submit these semen specimens and call our office for the results. I told him again that recanalization is a small but real possibility, and if he is ever concerned about it he can submit another semen specimen for analysis. After discussing the risks, benefits, possible complications and alternatives, informed consents were obtained. Diazepam was prescribed, and review dosing, adverse effects and timing of the procedure. He will return in the near future for the procedure. Chief Complaint     Desire for vasectomy      History of Present Illness     Mihaela Del Angel is a 51 y.o. male referred for evaluation of vasectomy. He has 1 biological children. He states that he and his partner have come to the mutual decision they do not desire any additional children. He has no prior past medical, past surgical, or past urologic history    Detailed Urologic History     - please refer to HPI    Review of Systems     Review of Systems   Constitutional: Negative for activity change and fatigue. HENT: Negative for congestion. Eyes: Negative for visual disturbance. Respiratory: Negative for shortness of breath and wheezing. Cardiovascular: Negative for chest pain and leg swelling. Gastrointestinal: Negative for abdominal pain. Endocrine: Negative for polyuria. Genitourinary: Negative for dysuria, flank pain, hematuria and urgency. Musculoskeletal: Negative for back pain. Allergic/Immunologic: Negative for immunocompromised state. Neurological: Negative for dizziness and numbness. Psychiatric/Behavioral: Negative for dysphoric mood. All other systems reviewed and are negative. Allergies     No Known Allergies    Physical Exam     Physical Exam   Constitutional: He is oriented to person, place, and time. He appears well-developed and well-nourished. No distress. HENT:   Head: Normocephalic and atraumatic. Eyes: EOM are normal.   Neck: Normal range of motion.    Cardiovascular:   Negative lower extremity edema   Pulmonary/Chest: Effort normal and breath sounds normal.   Abdominal: Soft. Genitourinary:   Genitourinary Comments: Vas deferens are palpable bilaterally. Musculoskeletal: Normal range of motion. Neurological: He is alert and oriented to person, place, and time. Skin: Skin is warm. Psychiatric: He has a normal mood and affect.  His behavior is normal.         Vital Signs  Vitals:    09/06/23 0724   BP: 134/86   Pulse: 63   SpO2: 98%   Weight: 99.8 kg (220 lb)   Height: 5' 10" (1.778 m)         Current Medications       Current Outpatient Medications:   •  Acetaminophen (TYLENOL PO), Take by mouth, Disp: , Rfl:   •  ALPRAZolam (XANAX) 2 MG tablet, Take 1 tablet (2 mg total) by mouth daily at bedtime as needed for anxiety, Disp: 1 tablet, Rfl: 0  •  b complex vitamins capsule, Take 1 capsule by mouth daily, Disp: , Rfl:   •  cholecalciferol (VITAMIN D3) 400 units tablet, Take 400 Units by mouth daily, Disp: , Rfl:   •  Coenzyme Q10 100 MG TABS, Take by mouth, Disp: , Rfl:   •  Ibuprofen (MOTRIN PO), Take by mouth, Disp: , Rfl:   •  lisinopril (ZESTRIL) 40 mg tablet, Take 1 tablet (40 mg total) by mouth daily, Disp: 90 tablet, Rfl: 0  •  MAGNESIUM PO, Take by mouth, Disp: , Rfl:   •  Multiple Vitamin (MULTIVITAMIN ADULT PO), Take by mouth, Disp: , Rfl:   •  Omega-3 1000 MG CAPS, Take by mouth daily, Disp: , Rfl:   •  sildenafil (VIAGRA) 100 mg tablet, sildenafil 100 mg tablet  take 1 tablet by mouth PRIOR TO ACTIVITY, Disp: , Rfl:   •  vardenafil (LEVITRA) 20 MG tablet, vardenafil 20 mg tablet  take 1 tablet by mouth if needed, Disp: , Rfl:       Active Problems     Patient Active Problem List   Diagnosis   • Rupture of left distal biceps tendon   • Benign hypertension   • Diverticulosis of colon   • Other male erectile dysfunction         Past Medical History     Past Medical History:   Diagnosis Date   • Asthma     childhood   • Hypertension    • Kidney stone          Surgical History     Past Surgical History:   Procedure Laterality Date   • COLONOSCOPY     • HEMORRHOID SURGERY     • KNEE SURGERY Left    • [place] CYSTO/URETERO W/LITHOTRIPSY &INDWELL STENT INSRT Right 2017    Procedure: CYSTOSCOPY; URETEROSCOPY; RETROGRADE PYELOGRAM; HOLMIUM LASER LITHOTRIPSY; BASKET STONE EXTRACTION; STENT PLACEMENT ;  Surgeon: Marvin Evans MD;  [facility];  Service: Urology   • [place] RINSJ RPTD BICEPS/TRICEPS TDN DSTL W/WO TDN GRF Left 2020    Procedure: REPAIR TENDON BICEPS left;  Surgeon: Twanda Hamman, MD;  [facility];  Service: Orthopedics   • TREATMENT FISTULA ANAL           Family History     Family History   Problem Relation Age of Onset   • No Known Problems Father    • No Known Problems Mother    • No Known Problems Sister    • No Known Problems Daughter          Social History     Social History     Social History     Tobacco Use   Smoking Status Former   • Types: Cigarettes   • Quit date:    • Years since quittin.6   • Passive exposure: Past   Smokeless Tobacco Never   Tobacco Comments    quit when 24 y.o. Portions of the record may have been created with voice recognition software. Occasional wrong word or "sound a like" substitutions may have occurred due to the inherent limitations of voice recognition software. Read the chart carefully and recognize, using context, where substitutions have occurred.

## 2023-09-06 ENCOUNTER — OFFICE VISIT (OUTPATIENT)
Dept: UROLOGY | Facility: CLINIC | Age: 48
End: 2023-09-06
Payer: COMMERCIAL

## 2023-09-06 VITALS
BODY MASS INDEX: 31.5 KG/M2 | OXYGEN SATURATION: 98 % | WEIGHT: 220 LBS | HEIGHT: 70 IN | SYSTOLIC BLOOD PRESSURE: 134 MMHG | HEART RATE: 63 BPM | DIASTOLIC BLOOD PRESSURE: 86 MMHG

## 2023-09-06 DIAGNOSIS — Z30.2 ENCOUNTER FOR STERILIZATION: Primary | ICD-10-CM

## 2023-09-06 PROCEDURE — 99213 OFFICE O/P EST LOW 20 MIN: CPT | Performed by: PHYSICIAN ASSISTANT

## 2023-09-06 RX ORDER — CHLORAL HYDRATE 500 MG
500 CAPSULE ORAL DAILY
COMMUNITY

## 2023-09-06 RX ORDER — VITAMIN B COMPLEX
1 CAPSULE ORAL DAILY
COMMUNITY

## 2023-09-06 RX ORDER — ALPRAZOLAM 2 MG/1
2 TABLET ORAL
Qty: 1 TABLET | Refills: 0 | Status: SHIPPED | OUTPATIENT
Start: 2023-09-06

## 2023-09-06 RX ORDER — OMEGA-3S/DHA/EPA/FISH OIL/D3 300MG-1000
400 CAPSULE ORAL DAILY
COMMUNITY

## 2023-09-07 ENCOUNTER — OFFICE VISIT (OUTPATIENT)
Dept: FAMILY MEDICINE CLINIC | Facility: CLINIC | Age: 48
End: 2023-09-07
Payer: COMMERCIAL

## 2023-09-07 VITALS
DIASTOLIC BLOOD PRESSURE: 82 MMHG | OXYGEN SATURATION: 97 % | TEMPERATURE: 96.3 F | BODY MASS INDEX: 31.04 KG/M2 | RESPIRATION RATE: 18 BRPM | SYSTOLIC BLOOD PRESSURE: 135 MMHG | WEIGHT: 216.8 LBS | HEART RATE: 62 BPM | HEIGHT: 70 IN

## 2023-09-07 DIAGNOSIS — G47.9 SLEEP DISTURBANCE: ICD-10-CM

## 2023-09-07 DIAGNOSIS — E66.09 CLASS 1 OBESITY DUE TO EXCESS CALORIES WITHOUT SERIOUS COMORBIDITY WITH BODY MASS INDEX (BMI) OF 31.0 TO 31.9 IN ADULT: ICD-10-CM

## 2023-09-07 DIAGNOSIS — I10 BENIGN HYPERTENSION: Primary | ICD-10-CM

## 2023-09-07 PROBLEM — E66.811 CLASS 1 OBESITY DUE TO EXCESS CALORIES WITHOUT SERIOUS COMORBIDITY WITH BODY MASS INDEX (BMI) OF 31.0 TO 31.9 IN ADULT: Status: ACTIVE | Noted: 2023-09-07

## 2023-09-07 PROCEDURE — 99214 OFFICE O/P EST MOD 30 MIN: CPT | Performed by: NURSE PRACTITIONER

## 2023-09-07 RX ORDER — LISINOPRIL 20 MG/1
20 TABLET ORAL DAILY
Qty: 90 TABLET | Refills: 0 | Status: SHIPPED | OUTPATIENT
Start: 2023-09-07

## 2023-09-07 NOTE — PROGRESS NOTES
OFFICE VISIT  Zenaida Ramos 51 y.o. male MRN: [id number]          Assessment / Plan:  Problem List Items Addressed This Visit        Cardiovascular and Mediastinum    Benign hypertension - Primary     Will remain off medication and continue to monitor. If blood pressure 140/80 he will start lisinopril at 20mg. A reduction of previous 40mg. He will continue to wrk on dietary changes. Relevant Medications    lisinopril (ZESTRIL) 20 mg tablet       Other    Class 1 obesity due to excess calories without serious comorbidity with body mass index (BMI) of 31.0 to 31.9 in adult     Continue working of lifestyle Modification , has lost 15 pounds since July. Sleep disturbance     No longer occurring since stopping blood pressure medication. Sleeping through the night, not fatigued in am              Reason For Visit / Chief Complaint  Chief Complaint   Patient presents with   • Follow-up     Discuss blood pressure medication. HPI:  Zenaida Ramos is a 51 y.o. male who presents today for HTN. He reports his blood pressure medication is making him tired. He had called the office and was recommended to take at night. He reports when he took at night, he was unable to sleep. Now, he reports not taking his blood pressure medication for three days and overall getting normal to borderline reading with no side effects of tired, feeling back to baseline.     Historical Information   Past Medical History:   Diagnosis Date   • Asthma     childhood   • Hypertension    • Kidney stone      Past Surgical History:   Procedure Laterality Date   • COLONOSCOPY     • HEMORRHOID SURGERY     • KNEE SURGERY Left    • NH CYSTO/URETERO W/LITHOTRIPSY &INDWELL STENT INSRT Right 06/08/2017    Procedure: CYSTOSCOPY; URETEROSCOPY; RETROGRADE PYELOGRAM; HOLMIUM LASER LITHOTRIPSY; BASKET STONE EXTRACTION; STENT PLACEMENT ;  Surgeon: Rajesh Duffy MD;  Location: [facility];  Service: Urology   • NH RINSJ RPTD BICEPS/TRICEPS TDN DSTL W/WO TDN GRF Left 2020    Procedure: REPAIR TENDON BICEPS left;  Surgeon: Jared Hess MD;  Location: [facility];  Service: Orthopedics   • TREATMENT FISTULA ANAL       Social History   Social History     Substance and Sexual Activity   Alcohol Use Not Currently   • Alcohol/week: 6.0 standard drinks of alcohol   • Types: 6 Cans of beer per week    Comment: Has'nt drank in over a year      Social History     Substance and Sexual Activity   Drug Use No     Social History     Tobacco Use   Smoking Status Former   • Types: Cigarettes   • Quit date:    • Years since quittin.6   • Passive exposure: Past   Smokeless Tobacco Never   Tobacco Comments    quit when 24 y.o.      Family History   Problem Relation Age of Onset   • No Known Problems Father    • No Known Problems Mother    • No Known Problems Sister    • No Known Problems Daughter        Meds/Allergies   No Known Allergies    Meds:    Current Outpatient Medications:   •  Acetaminophen (TYLENOL PO), Take by mouth, Disp: , Rfl:   •  b complex vitamins capsule, Take 1 capsule by mouth daily, Disp: , Rfl:   •  cholecalciferol (VITAMIN D3) 400 units tablet, Take 400 Units by mouth daily Taking 5000 IU, Disp: , Rfl:   •  Coenzyme Q10 100 MG TABS, Take by mouth, Disp: , Rfl:   •  Ibuprofen (MOTRIN PO), Take by mouth, Disp: , Rfl:   •  lisinopril (ZESTRIL) 20 mg tablet, Take 1 tablet (20 mg total) by mouth daily, Disp: 90 tablet, Rfl: 0  •  MAGNESIUM PO, Take by mouth 350 daily, Disp: , Rfl:   •  Multiple Vitamin (MULTIVITAMIN ADULT PO), Take by mouth, Disp: , Rfl:   •  Omega-3 1000 MG CAPS, Take 500 mg by mouth daily, Disp: , Rfl:   •  sildenafil (VIAGRA) 100 mg tablet, sildenafil 100 mg tablet  take 1 tablet by mouth PRIOR TO ACTIVITY, Disp: , Rfl:   •  vardenafil (LEVITRA) 20 MG tablet, vardenafil 20 mg tablet  take 1 tablet by mouth if needed, Disp: , Rfl:   •  ALPRAZolam (XANAX) 2 MG tablet, Take 1 tablet (2 mg total) by mouth daily at bedtime as needed for anxiety (Patient not taking: Reported on 9/7/2023), Disp: 1 tablet, Rfl: 0      REVIEW OF SYSTEMS  Review of Systems   Constitutional: Positive for fatigue. Negative for activity change, chills and fever. HENT: Negative for congestion, ear discharge, ear pain, sinus pressure, sinus pain, sore throat, tinnitus and trouble swallowing. Eyes: Negative for photophobia, pain, discharge, itching and visual disturbance. Respiratory: Negative for cough, chest tightness, shortness of breath and wheezing. Cardiovascular: Negative for chest pain and leg swelling. Gastrointestinal: Negative for abdominal distention, abdominal pain, constipation, diarrhea, nausea and vomiting. Endocrine: Negative for polydipsia, polyphagia and polyuria. Genitourinary: Negative for dysuria and frequency. Musculoskeletal: Negative for arthralgias, myalgias, neck pain and neck stiffness. Skin: Negative for color change. Neurological: Negative for dizziness, syncope, weakness, numbness and headaches. Hematological: Does not bruise/bleed easily. Psychiatric/Behavioral: Negative for behavioral problems, confusion, self-injury, sleep disturbance and suicidal ideas. The patient is not nervous/anxious. Current Vitals:   Blood Pressure: 135/82 (09/07/23 0723)  Pulse: 62 (09/07/23 0723)  Temperature: (!) 96.3 °F (35.7 °C) (09/07/23 0723)  Temp Source: Tympanic (09/07/23 0723)  Respirations: 18 (09/07/23 0723)  Height: 5' 10" (177.8 cm) (09/07/23 0723)  Weight - Scale: 98.3 kg (216 lb 12.8 oz) (09/07/23 0723)  SpO2: 97 % (09/07/23 0723)  [unfilled]    PHYSICAL EXAMS:  Physical Exam  Vitals and nursing note reviewed. Constitutional:       Appearance: Normal appearance. He is well-developed. HENT:      Head: Normocephalic and atraumatic. Nose: No congestion or rhinorrhea. Mouth/Throat:      Pharynx: No oropharyngeal exudate or posterior oropharyngeal erythema.    Eyes:      General: Right eye: No discharge. Left eye: No discharge. Conjunctiva/sclera: Conjunctivae normal.      Pupils: Pupils are equal, round, and reactive to light. Neck:      Thyroid: No thyromegaly. Cardiovascular:      Rate and Rhythm: Normal rate and regular rhythm. Pulses: Normal pulses. Heart sounds: Normal heart sounds. Pulmonary:      Effort: Pulmonary effort is normal.      Breath sounds: Normal breath sounds. No wheezing or rhonchi. Musculoskeletal:         General: No swelling, tenderness or deformity. Normal range of motion. Cervical back: Normal range of motion and neck supple. Right lower leg: No edema. Left lower leg: No edema. Skin:     General: Skin is warm and dry. Findings: No erythema or rash. Neurological:      General: No focal deficit present. Mental Status: He is alert and oriented to person, place, and time. Psychiatric:         Mood and Affect: Mood normal.         Behavior: Behavior normal.         Thought Content: Thought content normal.         Judgment: Judgment normal.             Lab, imaging and other studies: I have personally reviewed pertinent reports. Jt Houston

## 2023-09-07 NOTE — ASSESSMENT & PLAN NOTE
No longer occurring since stopping blood pressure medication.  Sleeping through the night, not fatigued in am

## 2023-09-07 NOTE — ASSESSMENT & PLAN NOTE
Will remain off medication and continue to monitor. If blood pressure 140/80 he will start lisinopril at 20mg. A reduction of previous 40mg. He will continue to wrk on dietary changes.

## 2023-09-09 ENCOUNTER — APPOINTMENT (OUTPATIENT)
Dept: LAB | Facility: CLINIC | Age: 48
End: 2023-09-09
Payer: COMMERCIAL

## 2023-09-09 DIAGNOSIS — Z00.00 ENCOUNTER FOR WELL ADULT EXAM WITHOUT ABNORMAL FINDINGS: ICD-10-CM

## 2023-09-09 LAB
ALBUMIN SERPL BCP-MCNC: 4.4 G/DL (ref 3.5–5)
ALP SERPL-CCNC: 43 U/L (ref 34–104)
ALT SERPL W P-5'-P-CCNC: 40 U/L (ref 7–52)
ANION GAP SERPL CALCULATED.3IONS-SCNC: 6 MMOL/L
AST SERPL W P-5'-P-CCNC: 18 U/L (ref 13–39)
BASOPHILS # BLD AUTO: 0.07 THOUSANDS/ÂΜL (ref 0–0.1)
BASOPHILS NFR BLD AUTO: 1 % (ref 0–1)
BILIRUB SERPL-MCNC: 0.79 MG/DL (ref 0.2–1)
BUN SERPL-MCNC: 18 MG/DL (ref 5–25)
CALCIUM SERPL-MCNC: 9.1 MG/DL (ref 8.4–10.2)
CHLORIDE SERPL-SCNC: 104 MMOL/L (ref 96–108)
CHOLEST SERPL-MCNC: 125 MG/DL
CO2 SERPL-SCNC: 28 MMOL/L (ref 21–32)
CREAT SERPL-MCNC: 0.79 MG/DL (ref 0.6–1.3)
EOSINOPHIL # BLD AUTO: 0.17 THOUSAND/ÂΜL (ref 0–0.61)
EOSINOPHIL NFR BLD AUTO: 2 % (ref 0–6)
ERYTHROCYTE [DISTWIDTH] IN BLOOD BY AUTOMATED COUNT: 13.2 % (ref 11.6–15.1)
GFR SERPL CREATININE-BSD FRML MDRD: 105 ML/MIN/1.73SQ M
GLUCOSE P FAST SERPL-MCNC: 93 MG/DL (ref 65–99)
HCT VFR BLD AUTO: 49.9 % (ref 36.5–49.3)
HDLC SERPL-MCNC: 40 MG/DL
HGB BLD-MCNC: 15.7 G/DL (ref 12–17)
IMM GRANULOCYTES # BLD AUTO: 0.02 THOUSAND/UL (ref 0–0.2)
IMM GRANULOCYTES NFR BLD AUTO: 0 % (ref 0–2)
LDLC SERPL CALC-MCNC: 74 MG/DL (ref 0–100)
LYMPHOCYTES # BLD AUTO: 3.17 THOUSANDS/ÂΜL (ref 0.6–4.47)
LYMPHOCYTES NFR BLD AUTO: 34 % (ref 14–44)
MCH RBC QN AUTO: 28.4 PG (ref 26.8–34.3)
MCHC RBC AUTO-ENTMCNC: 31.5 G/DL (ref 31.4–37.4)
MCV RBC AUTO: 90 FL (ref 82–98)
MONOCYTES # BLD AUTO: 0.94 THOUSAND/ÂΜL (ref 0.17–1.22)
MONOCYTES NFR BLD AUTO: 10 % (ref 4–12)
NEUTROPHILS # BLD AUTO: 4.88 THOUSANDS/ÂΜL (ref 1.85–7.62)
NEUTS SEG NFR BLD AUTO: 53 % (ref 43–75)
NRBC BLD AUTO-RTO: 0 /100 WBCS
PLATELET # BLD AUTO: 304 THOUSANDS/UL (ref 149–390)
PMV BLD AUTO: 10.7 FL (ref 8.9–12.7)
POTASSIUM SERPL-SCNC: 3.9 MMOL/L (ref 3.5–5.3)
PROT SERPL-MCNC: 7 G/DL (ref 6.4–8.4)
RBC # BLD AUTO: 5.53 MILLION/UL (ref 3.88–5.62)
SODIUM SERPL-SCNC: 138 MMOL/L (ref 135–147)
TRIGL SERPL-MCNC: 53 MG/DL
WBC # BLD AUTO: 9.25 THOUSAND/UL (ref 4.31–10.16)

## 2023-09-09 PROCEDURE — 85025 COMPLETE CBC W/AUTO DIFF WBC: CPT

## 2023-09-09 PROCEDURE — 80053 COMPREHEN METABOLIC PANEL: CPT

## 2023-09-09 PROCEDURE — 36415 COLL VENOUS BLD VENIPUNCTURE: CPT

## 2023-09-09 PROCEDURE — 80061 LIPID PANEL: CPT

## 2023-09-20 ENCOUNTER — TELEPHONE (OUTPATIENT)
Dept: UROLOGY | Facility: CLINIC | Age: 48
End: 2023-09-20

## 2023-09-22 ENCOUNTER — TELEPHONE (OUTPATIENT)
Dept: UROLOGY | Facility: CLINIC | Age: 48
End: 2023-09-22

## 2023-09-22 NOTE — TELEPHONE ENCOUNTER
Had called and told patient that we would have to r/s his VAS appt. Due to r/s - we no longer have to do that - called and  informed patient that this appt is fine .

## 2023-10-06 ENCOUNTER — PROCEDURE VISIT (OUTPATIENT)
Dept: UROLOGY | Facility: CLINIC | Age: 48
End: 2023-10-06
Payer: COMMERCIAL

## 2023-10-06 VITALS
HEIGHT: 70 IN | HEART RATE: 79 BPM | DIASTOLIC BLOOD PRESSURE: 70 MMHG | OXYGEN SATURATION: 98 % | SYSTOLIC BLOOD PRESSURE: 122 MMHG | WEIGHT: 214.2 LBS | RESPIRATION RATE: 16 BRPM | BODY MASS INDEX: 30.67 KG/M2

## 2023-10-06 DIAGNOSIS — Z30.2 ENCOUNTER FOR STERILIZATION: Primary | ICD-10-CM

## 2023-10-06 PROCEDURE — 55250 REMOVAL OF SPERM DUCT(S): CPT | Performed by: UROLOGY

## 2023-10-06 PROCEDURE — 88302 TISSUE EXAM BY PATHOLOGIST: CPT | Performed by: STUDENT IN AN ORGANIZED HEALTH CARE EDUCATION/TRAINING PROGRAM

## 2023-10-06 NOTE — PROGRESS NOTES
Vasectomy     Date/Time 10/6/2023 10:00 AM     Performed by  Ramon Dsouza DO   Authorized by Ramon Dsouza DO     Universal Protocol   Consent: Verbal consent obtained. Written consent obtained. Risks and benefits: risks, benefits and alternatives were discussed  Consent given by: patient  Time out: Immediately prior to procedure a "time out" was called to verify the correct patient, procedure, equipment, support staff and site/side marked as required. Timeout called at: 10/6/2023 11:07 AM.  Patient understanding: patient states understanding of the procedure being performed  Patient consent: the patient's understanding of the procedure matches consent given  Procedure consent: procedure consent matches procedure scheduled  Relevant documents: relevant documents present and verified  Patient identity confirmed: verbally with patient        Local anesthesia used: yes     Anesthesia   Local anesthesia used: yes  Local Anesthetic: lidocaine 2% without epinephrine  Anesthetic total: 10 mL     Sedation   Patient sedated: no        Specimen: yes (bilateral vas)    Culture: no   Procedure Details   Procedure Notes: Procedures       No Scalpel Vasectomy Procedure Note    Indications: 51 y.o.  y.o. male desiring permanent sterilization    Pre-operative Diagnosis: Undesired fertility    Post-operative Diagnosis: Undesired fertility    Anesthesia: Lidocaine 2% without epinephrine      Procedure Details       Risks and benefits of vasectomy were previously discussed. Informed consent was obtained at his prior office visit. The patient had taken a benzodiazepine as prescribed for anxiolysis and he had showered the morning of the procedure and clipped excess pubic hair. Music of the patient's choosing was also played for additional anxiolysis. The patient was placed in the supine position. His genitalia were prepped and draped in the usual sterile fashion.      The left vas deferens was grasped and presented to the area of interest on the left hemiscrotum. Next, 2% lidocaine was used to anesthetize the skin, subcutaneous tissue, and left vas deferens. The left vas deferens was grasped and presented into the surgical field with a vas clamp.   A #15 blade was used to make a longitudinal incision in the sheath of the vas deferens. The vas itself was then dissected free from the surrounding tissue. Clamps were placed proximally and distally and a 1 cm segment of the vas deferens was excised. Silk ties were applied and the exposed ends were fulgurated as was the lumen of the vas. Hemostasis was excellent. The vas was returned to its natural anatomic position after ensuring meticulous hemostasis. A single stitch of 3-0 chromic was placed through the skin and dartos to reapproximate the defect in a horizontal mattress fashion. The right vas deferens was then grasped and presented to the area of interest on the right hemiscrotum. Next, 2% lidocaine was used to anesthetize the skin, subcutaneous tissue, and right vas deferens. The right vas deferens was grasped and presented into the surgical field with a vas clamp.   A #15 blade was used to make a longitudinal incision in the sheath of the vas deferens. The vas itself was then dissected free from the surrounding tissue. Clamps were placed proximally and distally and a 1 cm segment of the vas deferens was excised. Silk ties were applied and the exposed ends were fulgurated as was the lumen of the vas. Hemostasis was excellent. The vas was returned to its natural anatomic position after ensuring meticulous hemostasis. A single stitch of 3-0 chromic was placed through the skin and dartos to reapproximate the defect in a horizontal mattress fashion. Specimen:   1. Right vas deferens  2. Left vas deferens    Condition: Stable    Complications: none    Plan:      He did very well with the procedure today. Postprocedural instructions were provided.  He will follow-up PRN.  He will continue to use any form of birth control that he is currently using until his sterility is confirmed

## 2023-10-06 NOTE — PATIENT INSTRUCTIONS
Vasectomy  A vasectomy is a surgical procedure performed on adult males in which the vasa deferentia (tubes that carry sperm from the testicles to the seminal vesicle) are interrupted through small puncture incisions in the scrotum. The semen no longer contains sperm after the tubes are cut, so conception cannot occur. The testicles continue to produce sperm, but they die and are absorbed by the body. Erectile function, orgasm, and ejaculation occur normally after the procedure. PURPOSE:  The purpose of the vasectomy is to provide reliable contraception. Research indicates that the level of effectiveness is 99.6%. Vasectomy is the most reliable method of contraception and has fewer complications and a faster recovery time than female sterilization methods. Approximately 500,000 vasectomies are performed annually in [facility]. About one out of every six men over the age of 28 has had a vasectomy. DESCRIPTION:  Vasectomies are performed in the doctor's office using local anesthesia. The area around the patient's scrotum (the sac containing the testicles that produce sperm) will be shaved and cleaned with an antiseptic solution to reduce the chance of infection. A small incision is made into the scrotum. Each of the vasa deferentia (one from each testicle) is tied in two places with non-absorbable (permanent) sutures and the tube is severed between the ties. The ends may be cauterized to decrease the chance that they will leak or grow back together. "Non-scalpel" vasectomies are gaining in popularity. Instead of an incision, a small puncture is made in the scrotum. The vasa deferentia are cut and sealed in a manner similar to that described above. Advantages include less damage to the tissues, less bleeding, less risk of infection, and less discomfort after the procedure. DIAGNOSIS/PREPARTION:   No special physical preparation is required for a vasectomy.   The physician will first assess the patient's general health in order to identify any potential problems that could occur. The physician will then explain the possible risks and side effects of the procedure. The patient is asked to sign a consent form which indicates that he understands the information he has received, and gives the doctor permission to perform the operation. The scrotum should be shaved by the patient prior to the procedure: shave entire scrotum to base of penis. You will need a ;  is to remain in the office during the procedure. CARE AFTER YOUR PROCEDURE:  Following the surgery, ice packs are often applied to the scrotum to decrease pain and swelling. A dressing (or athletic supporter) which supports the scrotum can also reduce pain. Mild over-the-counter pain medications such as aspirin or acetaminophen (Tylenol) should be able to control any discomfort. Activities may be restricted for one to two days, and sexual intercourse for three to four days. RISKS:  There are very few risks associated with vasectomy other than infection, bruising, epididymitis (inflammation of the tube that carries the sperm from the testicle to the penis), sperm granulomas (collections of fluid that leaks from a poorly sealed or ties vas deferens), and chronic pain in 1-2% of men. Patients do not experience difficulty achieving an erection, maintaining an erection, or ejaculating. There is no decrease in the production of the male hormone (testosterone), and the patient's sex drive and ability are not altered. Vasectomy is safer and less expensive than a tubal ligation (sterilization of a female by cutting the Fallopian tubes to prevent conception). According to both the [address] [place] [address] ([facility]) and [place] [address] ([facility]), there is no evidence that a vasectomy will increase a man's long-term risk of testicular cancer, prostate cancer or heart disease.     NORMAL RESULTS:   Vasectomies are 99% successful in preventing conception. As such, a male sterilization is one of the most effective methods of contraception available to consumers. Complications occur in approximately 5% of vasectomies. The rates of incidences of some of the more common complications are:  mild bleeding into the scrotum:  1:50  major bleeding into the scrotum:  1:100  infection:  1:25  epididymitis:  1:100  sperm granuloma:  1:20  persistent pain:  1-2% of men  POST VASECTOMY EXPECTATIONS    You are not sterile immediately following the procedure. You will need to have a semen analysis test after the appropriate interval. You will need to use contraceptive protection until you have been cleared by the doctor. You will need to continually ice the area for the first 24 hours following the procedure, even at bedtime. The second day you will need to ice every 3 hours for approximately 15-30 minutes. You will need to wear tight briefs for support. If you have a job requiring you to stand for prolonged periods of time, we do recommend wearing a jock strap on the outside of your underwear. If you need to use pain medication, do not mix with alcoholic beverages. You may use Advil or Tylenol for discomfort in lieu of the prescribed pain medication. You may shower in 24 hours. You may resume sexual activity in 72 hours. We recommend that you do not take a bath, swim or soak in a hot tub for at least 7-10 days - preferably not before your post-op visit. You may return to work in 48-72 hours unless otherwise advised. If necessary, a work note can be given. You should not lift anything over 5-10lbs for the next 72 hours. You should monitor your temperature once a day for a week. Call if you have any fever/chills, or if there is irregular drainage noted from the incision site. POST VASECTOMY SPECIMEN COLLECTION  PURPOSE:   Patients are not immediately sterile following vasectomy.  There is significant variation in the time necessary to clear previously produced sperm, therefore follow-up testing is necessary to assure sterility. INTRUCTIONS:  Confirmation of sterility requires a semen analysis. The sample can be submitted to the lab 8 weeks post-procedure. COLLECTION TIPS:  You should have at least 15 ejaculations prior to submitting the sample. The specimen should be delivered to the lab within 1 hour of collection. You may use masturbation to collect specimen. Do not have any ejaculations 2 days before providing sample. Keep the specimen close to body temperature. Please call the office for results 1 week after specimen submission for clearance or further instructions. Be sure to use an alternative form of birth control until instructed by office.      All testing through [facility]. [facility] must be scheduled, in advance, [phone]

## 2023-10-09 ENCOUNTER — TELEPHONE (OUTPATIENT)
Age: 48
End: 2023-10-09

## 2023-10-09 NOTE — TELEPHONE ENCOUNTER
Called and spoke with lab and informed them of only the right vas was able to be send due to in office complication

## 2023-10-09 NOTE — TELEPHONE ENCOUNTER
Lab calling right vas deferens is missing from packing list  And was not in the bag.       Please call lab at [phone]

## 2023-10-13 PROCEDURE — 88302 TISSUE EXAM BY PATHOLOGIST: CPT | Performed by: STUDENT IN AN ORGANIZED HEALTH CARE EDUCATION/TRAINING PROGRAM

## 2023-10-30 ENCOUNTER — RA CDI HCC (OUTPATIENT)
Dept: OTHER | Facility: HOSPITAL | Age: 48
End: 2023-10-30

## 2023-11-13 ENCOUNTER — OFFICE VISIT (OUTPATIENT)
Dept: FAMILY MEDICINE CLINIC | Facility: CLINIC | Age: 48
End: 2023-11-13
Payer: COMMERCIAL

## 2023-11-13 VITALS
HEART RATE: 64 BPM | RESPIRATION RATE: 18 BRPM | WEIGHT: 214.8 LBS | DIASTOLIC BLOOD PRESSURE: 82 MMHG | HEIGHT: 70 IN | OXYGEN SATURATION: 99 % | TEMPERATURE: 98 F | SYSTOLIC BLOOD PRESSURE: 126 MMHG | BODY MASS INDEX: 30.75 KG/M2

## 2023-11-13 DIAGNOSIS — E66.09 CLASS 1 OBESITY DUE TO EXCESS CALORIES WITHOUT SERIOUS COMORBIDITY WITH BODY MASS INDEX (BMI) OF 31.0 TO 31.9 IN ADULT: ICD-10-CM

## 2023-11-13 DIAGNOSIS — I10 BENIGN HYPERTENSION: Primary | ICD-10-CM

## 2023-11-13 DIAGNOSIS — G47.9 SLEEP DISTURBANCE: ICD-10-CM

## 2023-11-13 PROCEDURE — 99214 OFFICE O/P EST MOD 30 MIN: CPT | Performed by: NURSE PRACTITIONER

## 2023-11-13 NOTE — ASSESSMENT & PLAN NOTE
Has been making lifestyle changes, has been off lisinopril for approx 4 months, blood pressure stable.  He will remain off medication and continue with diet changes and weight loss

## 2023-11-13 NOTE — PROGRESS NOTES
OFFICE VISIT  Marilee Alarcon 51 y.o. male MRN: [id number]          Assessment / Plan:  Problem List Items Addressed This Visit          Cardiovascular and Mediastinum    Benign hypertension - Primary     Has been making lifestyle changes, has been off lisinopril for approx 4 months, blood pressure stable. He will remain off medication and continue with diet changes and weight loss            Other    Class 1 obesity due to excess calories without serious comorbidity with body mass index (BMI) of 31.0 to 31.9 in adult     Has lost 15 pounds since summer with lifestyle changes. Keep up the good work          Relevant Orders    CBC and differential    Comprehensive metabolic panel    Lipid Panel with Direct LDL reflex    Sleep disturbance     Sleeping much better since off medication. Reason For Visit / Chief Complaint  Chief Complaint   Patient presents with    Follow-up     4 month follow up        HPI:  Marilee Alarcon is a 51 y.o. male who presents today for routine followup.      Historical Information   Past Medical History:   Diagnosis Date    Asthma     childhood    Hypertension     Kidney stone      Past Surgical History:   Procedure Laterality Date    COLONOSCOPY      HEMORRHOID SURGERY      KNEE SURGERY Left     [place] CYSTO/URETERO W/LITHOTRIPSY &INDWELL STENT INSRT Right 06/08/2017    Procedure: CYSTOSCOPY; URETEROSCOPY; RETROGRADE PYELOGRAM; HOLMIUM LASER LITHOTRIPSY; BASKET STONE EXTRACTION; STENT PLACEMENT ;  Surgeon: Raphael Srinivasan MD;  [facility];  Service: Urology    [place] RINSJ RPTD BICEPS/TRICEPS TDN DSTL W/WO TDN GRF Left 01/08/2020    Procedure: REPAIR TENDON BICEPS left;  Surgeon: Simeon Soto MD;  Location: [facility];  Service: Orthopedics    TREATMENT FISTULA ANAL       Social History   Social History     Substance and Sexual Activity   Alcohol Use Not Currently    Alcohol/week: 6.0 standard drinks of alcohol    Types: 6 Cans of beer per week    Comment: Has'nt drank in over a 2022     Social History     Substance and Sexual Activity   Drug Use No     Social History     Tobacco Use   Smoking Status Former    Types: Cigarettes    Quit date:     Years since quittin.8    Passive exposure: Past   Smokeless Tobacco Never   Tobacco Comments    quit when 24 y.o. Family History   Problem Relation Age of Onset    No Known Problems Father     No Known Problems Mother     No Known Problems Sister     No Known Problems Daughter        Meds/Allergies   No Known Allergies    Meds:    Current Outpatient Medications:     Acetaminophen (TYLENOL PO), Take by mouth, Disp: , Rfl:     ALPRAZolam (XANAX) 2 MG tablet, Take 1 tablet (2 mg total) by mouth daily at bedtime as needed for anxiety, Disp: 1 tablet, Rfl: 0    b complex vitamins capsule, Take 1 capsule by mouth daily, Disp: , Rfl:     cholecalciferol (VITAMIN D3) 400 units tablet, Take 400 Units by mouth daily Taking 5000 IU, Disp: , Rfl:     Coenzyme Q10 100 MG TABS, Take by mouth, Disp: , Rfl:     Ibuprofen (MOTRIN PO), Take by mouth, Disp: , Rfl:     MAGNESIUM PO, Take by mouth 350 daily, Disp: , Rfl:     Multiple Vitamin (MULTIVITAMIN ADULT PO), Take by mouth, Disp: , Rfl:     Omega-3 1000 MG CAPS, Take 500 mg by mouth daily, Disp: , Rfl:     sildenafil (VIAGRA) 100 mg tablet, sildenafil 100 mg tablet  take 1 tablet by mouth PRIOR TO ACTIVITY, Disp: , Rfl:     vardenafil (LEVITRA) 20 MG tablet, vardenafil 20 mg tablet  take 1 tablet by mouth if needed, Disp: , Rfl:       REVIEW OF SYSTEMS  Review of Systems   Constitutional:  Negative for activity change, chills, fatigue and fever. HENT:  Negative for congestion, ear discharge, ear pain, sinus pressure, sinus pain, sore throat, tinnitus and trouble swallowing. Eyes:  Negative for photophobia, pain, discharge, itching and visual disturbance. Respiratory:  Negative for cough, chest tightness, shortness of breath and wheezing.     Cardiovascular:  Negative for chest pain and leg swelling. Gastrointestinal:  Negative for abdominal distention, abdominal pain, constipation, diarrhea, nausea and vomiting. Endocrine: Negative for polydipsia, polyphagia and polyuria. Genitourinary:  Negative for dysuria and frequency. Musculoskeletal:  Negative for arthralgias, myalgias, neck pain and neck stiffness. Skin:  Negative for color change. Neurological:  Negative for dizziness, syncope, weakness, numbness and headaches. Hematological:  Does not bruise/bleed easily. Psychiatric/Behavioral:  Negative for behavioral problems, confusion, self-injury, sleep disturbance and suicidal ideas. The patient is not nervous/anxious. Current Vitals:   Blood Pressure: 126/82 (11/13/23 0658)  Pulse: 64 (11/13/23 0658)  Temperature: 98 °F (36.7 °C) (11/13/23 0658)  Temp Source: Tympanic (11/13/23 0658)  Respirations: 18 (11/13/23 0658)  Height: 5' 10" (177.8 cm) (11/13/23 3935)  Weight - Scale: 97.4 kg (214 lb 12.8 oz) (11/13/23 0658)  SpO2: 99 % (11/13/23 0658)  [unfilled]    PHYSICAL EXAMS:  Physical Exam  Vitals and nursing note reviewed. Constitutional:       Appearance: He is obese. HENT:      Head: Normocephalic and atraumatic. Cardiovascular:      Rate and Rhythm: Normal rate and regular rhythm. Pulses: Normal pulses. Heart sounds: Normal heart sounds. Pulmonary:      Effort: Pulmonary effort is normal.      Breath sounds: Normal breath sounds. Musculoskeletal:         General: Normal range of motion. Cervical back: Normal range of motion. Skin:     General: Skin is warm. Neurological:      General: No focal deficit present. Mental Status: He is alert and oriented to person, place, and time. Psychiatric:         Mood and Affect: Mood normal.         Behavior: Behavior normal.         Thought Content: Thought content normal.         Judgment: Judgment normal.             Lab, imaging and other studies: I have personally reviewed pertinent reports. Ze Hall

## 2023-11-21 ENCOUNTER — APPOINTMENT (OUTPATIENT)
Dept: LAB | Facility: HOSPITAL | Age: 48
End: 2023-11-21
Attending: UROLOGY
Payer: COMMERCIAL

## 2023-11-21 DIAGNOSIS — Z30.2 ENCOUNTER FOR STERILIZATION: ICD-10-CM

## 2023-11-21 LAB
DEPRECATED CD4 CELLS/CD8 CELLS BLD: 0.5 ML
SPERM MOTILE SMN QL MICRO: NORMAL

## 2023-11-21 PROCEDURE — 89321 SEMEN ANAL SPERM DETECTION: CPT

## 2024-02-28 ENCOUNTER — OFFICE VISIT (OUTPATIENT)
Dept: FAMILY MEDICINE CLINIC | Facility: CLINIC | Age: 49
End: 2024-02-28
Payer: COMMERCIAL

## 2024-02-28 VITALS
SYSTOLIC BLOOD PRESSURE: 138 MMHG | OXYGEN SATURATION: 97 % | TEMPERATURE: 98.5 F | WEIGHT: 215 LBS | RESPIRATION RATE: 18 BRPM | BODY MASS INDEX: 30.78 KG/M2 | DIASTOLIC BLOOD PRESSURE: 80 MMHG | HEART RATE: 94 BPM | HEIGHT: 70 IN

## 2024-02-28 DIAGNOSIS — U07.1 COVID: ICD-10-CM

## 2024-02-28 DIAGNOSIS — E66.09 CLASS 1 OBESITY DUE TO EXCESS CALORIES WITHOUT SERIOUS COMORBIDITY WITH BODY MASS INDEX (BMI) OF 31.0 TO 31.9 IN ADULT: Primary | ICD-10-CM

## 2024-02-28 DIAGNOSIS — U07.1 COVID: Primary | ICD-10-CM

## 2024-02-28 DIAGNOSIS — I10 BENIGN HYPERTENSION: ICD-10-CM

## 2024-02-28 LAB
SARS-COV-2 AG UPPER RESP QL IA: POSITIVE
VALID CONTROL: ABNORMAL

## 2024-02-28 PROCEDURE — 99214 OFFICE O/P EST MOD 30 MIN: CPT | Performed by: NURSE PRACTITIONER

## 2024-02-28 PROCEDURE — 87811 SARS-COV-2 COVID19 W/OPTIC: CPT | Performed by: NURSE PRACTITIONER

## 2024-02-28 NOTE — LETTER
February 28, 2024     Patient: Paul Melvin  YOB: 1975  Date of Visit: 2/28/2024      To Whom it May Concern:    Paul Melvin is under my professional care. Paul was seen in my office on 2/28/2024. Paul may return to work on 3/4/24, please excuse from 2/27/24 .    If you have any questions or concerns, please don't hesitate to call.         Sincerely,          EDY Pompa        CC: No Recipients

## 2024-02-28 NOTE — PROGRESS NOTES
Bedside shift change report given to [address] (oncoming nurse) by Radha Morton RN (offgoing nurse). Report included the following information SBAR, Kardex, MAR and Recent Results. OFFICE VISIT  Paul Melvin 48 y.o. male MRN: [id number]      Depression Screening and Follow-up Plan: Patient was screened for depression during today's encounter. They screened negative with a PHQ-2 score of 0.         Assessment / Plan:  Problem List Items Addressed This Visit          Cardiovascular and Mediastinum    Benign hypertension     -under good control without use of medication  -avoid decongestant during acute URI   -remain with periodic home checks             Other    Class 1 obesity due to excess calories without serious comorbidity with body mass index (BMI) of 31.0 to 31.9 in adult - Primary     -Continue with home gym workout  -continue with dietary changes.            COVID     Started Sunday, and improving, continue current regimen, add vit c and vit d   COVID-19 Home Care Guidelines    Your healthcare provider and/or public health staff have evaluated you and have determined that you do not need to remain in the hospital at this time.  At this time you can be isolated at home where you will be monitored by staff from your local or state health department. You should carefully follow the prevention and isolation steps below until a healthcare provider or local or state health department says that you can return to your normal activities.      Stay home except to get medical care    People who are mildly ill with COVID-19 are able to isolate at home during their illness. You should restrict activities outside your home, except for getting medical care. Do not go to work, school, or public areas. Avoid using public transportation, ride-sharing, or taxis.    Separate yourself from other people and animals in your home    People: As much as possible, you should stay in a specific room and away from other people in your home. Also, you should use a separate bathroom, if available.  Animals: You should restrict contact with pets and other animals while you are sick with COVID-19, just like you  would around other people. Although there have not been reports of pets or other animals becoming sick with COVID-19, it is still recommended that people sick with COVID-19 limit contact with animals until more information is known about the virus. When possible, have another member of your household care for your animals while you are sick. If you are sick with COVID-19, avoid contact with your pet, including petting, snuggling, being kissed or licked, and sharing food. If you must care for your pet or be around animals while you are sick, wash your hands before and after you interact with pets and wear a facemask. See COVID-19 and Animals for more information.    Call ahead before visiting your doctor    If you have a medical appointment, call the healthcare provider and tell them that you have or may have COVID-19. This will help the healthcare provider’s office take steps to keep other people from getting infected or exposed.    Wear a facemask    You should wear a facemask when you are around other people (e.g., sharing a room or vehicle) or pets and before you enter a healthcare provider’s office. If you are not able to wear a facemask (for example, because it causes trouble breathing), then people who live with you should not stay in the same room with you, or they should wear a facemask if they enter your room.    Cover your coughs and sneezes    Cover your mouth and nose with a tissue when you cough or sneeze. Throw used tissues in a lined trash can. Immediately wash your hands with soap and water for at least 20 seconds or, if soap and water are not available, clean your hands with an alcohol-based hand  that contains at least 60% alcohol.    Clean your hands often    Wash your hands often with soap and water for at least 20 seconds, especially after blowing your nose, coughing, or sneezing; going to the bathroom; and before eating or preparing food. If soap and water are not readily available,  use an alcohol-based hand  with at least 60% alcohol, covering all surfaces of your hands and rubbing them together until they feel dry.  Soap and water are the best option if hands are visibly dirty. Avoid touching your eyes, nose, and mouth with unwashed hands.    Avoid sharing personal household items    You should not share dishes, drinking glasses, cups, eating utensils, towels, or bedding with other people or pets in your home. After using these items, they should be washed thoroughly with soap and water.    Clean all “high-touch” surfaces everyday    High touch surfaces include counters, tabletops, doorknobs, bathroom fixtures, toilets, phones, keyboards, tablets, and bedside tables. Also, clean any surfaces that may have blood, stool, or body fluids on them. Use a household cleaning spray or wipe, according to the label instructions. Labels contain instructions for safe and effective use of the cleaning product including precautions you should take when applying the product, such as wearing gloves and making sure you have good ventilation during use of the product.    Monitor your symptoms    Seek prompt medical attention if your illness is worsening (e.g., difficulty breathing). Before seeking care, call your healthcare provider and tell them that you have, or are being evaluated for, COVID-19. Put on a facemask before you enter the facility. These steps will help the healthcare provider’s office to keep other people in the office or waiting room from getting infected or exposed. Ask your healthcare provider to call the local or state health department. Persons who are placed under active monitoring or facilitated self-monitoring should follow instructions provided by their local health department or occupational health professionals, as appropriate.  If you have a medical emergency and need to call 911, notify the dispatch personnel that you have, or are being evaluated for COVID-19. If possible,  put on a facemask before emergency medical services arrive.    Discontinuing home isolation    Patients with confirmed COVID-19 should remain under home isolation precautions until the following conditions are met:   They have had no fever for at least 24 hours (that is one full day of no fever without the use medicine that reduces fevers)  AND  other symptoms have improved (for example, when their cough or shortness of breath have improved)  AND  If had mild or moderate illness, at least 10 days have passed since their symptoms first appeared or if severe illness (needed oxygen) or immunosuppressed, at least 20 days have passed since symptoms first appeared  Patients with confirmed COVID-19 should also notify close contacts (including their workplace) and ask that they self-quarantine. Currently, close contact is defined as being within 6 feet for 15 minutes or more from the period 24 hours starting 48 hours before symptom onset to the time at which the patient went into isolation.  Close contacts of patients diagnosed with COVID-19 should be instructed by the patient to self-quarantine for 14 days from the last time of their last contact with the patient.     Source: https://www.cdc.gov/coronavirus/2019-ncov/hcp/guidance-prevent-spread.html               Reason For Visit / Chief Complaint  Chief Complaint   Patient presents with    Generalized Body Aches     sunday    Nasal Congestion     Sight cough from post nasal drip.     Fatigue        HPI:  Paul Melvin is a 48 y.o. male who presents today for URI like symptoms, runny nose, congestion, fatigue, with some body aches. He reports sinus pain and pressure, he is taking nasal decongettant.  He reports taking nyquil. Reports sweats at hs. He reports no known fever.    Historical Information   Past Medical History:   Diagnosis Date    Asthma     childhood    Hypertension     Kidney stone      Past Surgical History:   Procedure Laterality Date    COLONOSCOPY       HEMORRHOID SURGERY      KNEE SURGERY Left     [place] CYSTO/URETERO W/LITHOTRIPSY &INDWELL STENT INSRT Right 2017    Procedure: CYSTOSCOPY; URETEROSCOPY; RETROGRADE PYELOGRAM; HOLMIUM LASER LITHOTRIPSY; BASKET STONE EXTRACTION; STENT PLACEMENT ;  Surgeon: Maximiliano Callaway MD;  [facility];  Service: Urology    [place] RINSJ RPTD BICEPS/TRICEPS TDN DSTL W/WO TDN GRF Left 2020    Procedure: REPAIR TENDON BICEPS left;  Surgeon: Quentin Moyer MD;  Location: [facility];  Service: Orthopedics    TREATMENT FISTULA ANAL       Social History   Social History     Substance and Sexual Activity   Alcohol Use Not Currently    Alcohol/week: 6.0 standard drinks of alcohol    Types: 6 Cans of beer per week    Comment: Has'nt drank in over a year      Social History     Substance and Sexual Activity   Drug Use No     Social History     Tobacco Use   Smoking Status Former    Current packs/day: 0.00    Types: Cigarettes    Quit date:     Years since quittin.1    Passive exposure: Past   Smokeless Tobacco Never   Tobacco Comments    quit when 23 y.o.     Family History   Problem Relation Age of Onset    No Known Problems Father     No Known Problems Mother     No Known Problems Sister     No Known Problems Daughter        Meds/Allergies   No Known Allergies    Meds:    Current Outpatient Medications:     b complex vitamins capsule, Take 1 capsule by mouth daily, Disp: , Rfl:     cholecalciferol (VITAMIN D3) 400 units tablet, Take 400 Units by mouth daily Taking 5000 IU, Disp: , Rfl:     Coenzyme Q10 100 MG TABS, Take by mouth, Disp: , Rfl:     MAGNESIUM PO, Take by mouth 350 daily, Disp: , Rfl:     Multiple Vitamin (MULTIVITAMIN ADULT PO), Take by mouth, Disp: , Rfl:     Omega-3 1000 MG CAPS, Take 500 mg by mouth daily, Disp: , Rfl:     sildenafil (VIAGRA) 100 mg tablet, sildenafil 100 mg tablet  take 1 tablet by mouth PRIOR TO ACTIVITY, Disp: , Rfl:     Acetaminophen (TYLENOL PO), Take by mouth (Patient not taking:  "Reported on 2/28/2024), Disp: , Rfl:     ALPRAZolam (XANAX) 2 MG tablet, Take 1 tablet (2 mg total) by mouth daily at bedtime as needed for anxiety (Patient not taking: Reported on 2/28/2024), Disp: 1 tablet, Rfl: 0    Ibuprofen (MOTRIN PO), Take by mouth (Patient not taking: Reported on 2/28/2024), Disp: , Rfl:     vardenafil (LEVITRA) 20 MG tablet, vardenafil 20 mg tablet  take 1 tablet by mouth if needed (Patient not taking: Reported on 2/28/2024), Disp: , Rfl:       REVIEW OF SYSTEMS  Review of Systems   Constitutional:  Positive for chills. Negative for activity change, fatigue and fever.   HENT:  Positive for congestion and sinus pain. Negative for ear discharge, ear pain, sinus pressure, sore throat, tinnitus and trouble swallowing.    Eyes:  Negative for photophobia, pain, discharge, itching and visual disturbance.   Respiratory:  Negative for cough, chest tightness, shortness of breath and wheezing.    Cardiovascular:  Negative for chest pain and leg swelling.   Gastrointestinal:  Negative for abdominal distention, abdominal pain, constipation, diarrhea, nausea and vomiting.   Endocrine: Negative for polydipsia, polyphagia and polyuria.   Genitourinary:  Negative for dysuria and frequency.   Musculoskeletal:  Negative for arthralgias, myalgias, neck pain and neck stiffness.   Skin:  Negative for color change.   Neurological:  Negative for dizziness, syncope, weakness, numbness and headaches.   Hematological:  Does not bruise/bleed easily.   Psychiatric/Behavioral:  Negative for behavioral problems, confusion, self-injury, sleep disturbance and suicidal ideas. The patient is not nervous/anxious.            Current Vitals:   Blood Pressure: 138/80 (02/28/24 0928)  Pulse: 94 (02/28/24 0928)  Temperature: 98.5 °F (36.9 °C) (02/28/24 0928)  Temp Source: Tympanic (02/28/24 0928)  Respirations: 18 (02/28/24 0928)  Height: 5' 10\" (177.8 cm) (02/28/24 0928)  Weight - Scale: 97.5 kg (215 lb) (02/28/24 0928)  SpO2: 97 " % (02/28/24 0928)  [unfilled]    PHYSICAL EXAMS:  Physical Exam  Constitutional:       Appearance: Normal appearance.   HENT:      Head: Normocephalic and atraumatic.      Nose: Congestion present.   Cardiovascular:      Rate and Rhythm: Normal rate and regular rhythm.      Pulses: Normal pulses.      Heart sounds: Normal heart sounds.   Pulmonary:      Effort: Pulmonary effort is normal.      Breath sounds: Normal breath sounds.   Neurological:      Mental Status: He is alert.             Lab, imaging and other studies: I have personally reviewed pertinent reports.  .

## 2024-02-28 NOTE — ASSESSMENT & PLAN NOTE
-under good control without use of medication  -avoid decongestant during acute URI   -remain with periodic home checks

## 2024-02-28 NOTE — ASSESSMENT & PLAN NOTE
Started Sunday, and improving, continue current regimen, add vit c and vit d   COVID-19 Home Care Guidelines    Your healthcare provider and/or public health staff have evaluated you and have determined that you do not need to remain in the hospital at this time.  At this time you can be isolated at home where you will be monitored by staff from your local or state health department. You should carefully follow the prevention and isolation steps below until a healthcare provider or local or state health department says that you can return to your normal activities.      Stay home except to get medical care    People who are mildly ill with COVID-19 are able to isolate at home during their illness. You should restrict activities outside your home, except for getting medical care. Do not go to work, school, or public areas. Avoid using public transportation, ride-sharing, or taxis.    Separate yourself from other people and animals in your home    People: As much as possible, you should stay in a specific room and away from other people in your home. Also, you should use a separate bathroom, if available.  Animals: You should restrict contact with pets and other animals while you are sick with COVID-19, just like you would around other people. Although there have not been reports of pets or other animals becoming sick with COVID-19, it is still recommended that people sick with COVID-19 limit contact with animals until more information is known about the virus. When possible, have another member of your household care for your animals while you are sick. If you are sick with COVID-19, avoid contact with your pet, including petting, snuggling, being kissed or licked, and sharing food. If you must care for your pet or be around animals while you are sick, wash your hands before and after you interact with pets and wear a facemask. See COVID-19 and Animals for more information.    Call ahead before visiting your  doctor    If you have a medical appointment, call the healthcare provider and tell them that you have or may have COVID-19. This will help the healthcare provider’s office take steps to keep other people from getting infected or exposed.    Wear a facemask    You should wear a facemask when you are around other people (e.g., sharing a room or vehicle) or pets and before you enter a healthcare provider’s office. If you are not able to wear a facemask (for example, because it causes trouble breathing), then people who live with you should not stay in the same room with you, or they should wear a facemask if they enter your room.    Cover your coughs and sneezes    Cover your mouth and nose with a tissue when you cough or sneeze. Throw used tissues in a lined trash can. Immediately wash your hands with soap and water for at least 20 seconds or, if soap and water are not available, clean your hands with an alcohol-based hand  that contains at least 60% alcohol.    Clean your hands often    Wash your hands often with soap and water for at least 20 seconds, especially after blowing your nose, coughing, or sneezing; going to the bathroom; and before eating or preparing food. If soap and water are not readily available, use an alcohol-based hand  with at least 60% alcohol, covering all surfaces of your hands and rubbing them together until they feel dry.  Soap and water are the best option if hands are visibly dirty. Avoid touching your eyes, nose, and mouth with unwashed hands.    Avoid sharing personal household items    You should not share dishes, drinking glasses, cups, eating utensils, towels, or bedding with other people or pets in your home. After using these items, they should be washed thoroughly with soap and water.    Clean all “high-touch” surfaces everyday    High touch surfaces include counters, tabletops, doorknobs, bathroom fixtures, toilets, phones, keyboards, tablets, and bedside tables.  Also, clean any surfaces that may have blood, stool, or body fluids on them. Use a household cleaning spray or wipe, according to the label instructions. Labels contain instructions for safe and effective use of the cleaning product including precautions you should take when applying the product, such as wearing gloves and making sure you have good ventilation during use of the product.    Monitor your symptoms    Seek prompt medical attention if your illness is worsening (e.g., difficulty breathing). Before seeking care, call your healthcare provider and tell them that you have, or are being evaluated for, COVID-19. Put on a facemask before you enter the facility. These steps will help the healthcare provider’s office to keep other people in the office or waiting room from getting infected or exposed. Ask your healthcare provider to call the local or state health department. Persons who are placed under active monitoring or facilitated self-monitoring should follow instructions provided by their local health department or occupational health professionals, as appropriate.  If you have a medical emergency and need to call 911, notify the dispatch personnel that you have, or are being evaluated for COVID-19. If possible, put on a facemask before emergency medical services arrive.    Discontinuing home isolation    Patients with confirmed COVID-19 should remain under home isolation precautions until the following conditions are met:   They have had no fever for at least 24 hours (that is one full day of no fever without the use medicine that reduces fevers)  AND  other symptoms have improved (for example, when their cough or shortness of breath have improved)  AND  If had mild or moderate illness, at least 10 days have passed since their symptoms first appeared or if severe illness (needed oxygen) or immunosuppressed, at least 20 days have passed since symptoms first appeared  Patients with confirmed COVID-19 should  also notify close contacts (including their workplace) and ask that they self-quarantine. Currently, close contact is defined as being within 6 feet for 15 minutes or more from the period 24 hours starting 48 hours before symptom onset to the time at which the patient went into isolation.  Close contacts of patients diagnosed with COVID-19 should be instructed by the patient to self-quarantine for 14 days from the last time of their last contact with the patient.     Source: https://www.cdc.gov/coronavirus/2019-ncov/hcp/guidance-prevent-spread.html

## 2024-05-11 ENCOUNTER — LAB (OUTPATIENT)
Dept: LAB | Facility: MEDICAL CENTER | Age: 49
End: 2024-05-11
Payer: COMMERCIAL

## 2024-05-11 DIAGNOSIS — E66.09 CLASS 1 OBESITY DUE TO EXCESS CALORIES WITHOUT SERIOUS COMORBIDITY WITH BODY MASS INDEX (BMI) OF 31.0 TO 31.9 IN ADULT: ICD-10-CM

## 2024-05-11 LAB
ALBUMIN SERPL BCP-MCNC: 4.4 G/DL (ref 3.5–5)
ALP SERPL-CCNC: 47 U/L (ref 34–104)
ALT SERPL W P-5'-P-CCNC: 24 U/L (ref 7–52)
ANION GAP SERPL CALCULATED.3IONS-SCNC: 11 MMOL/L (ref 4–13)
AST SERPL W P-5'-P-CCNC: 17 U/L (ref 13–39)
BASOPHILS # BLD AUTO: 0.08 THOUSANDS/ÂΜL (ref 0–0.1)
BASOPHILS NFR BLD AUTO: 1 % (ref 0–1)
BILIRUB SERPL-MCNC: 0.72 MG/DL (ref 0.2–1)
BUN SERPL-MCNC: 15 MG/DL (ref 5–25)
CALCIUM SERPL-MCNC: 9.1 MG/DL (ref 8.4–10.2)
CHLORIDE SERPL-SCNC: 104 MMOL/L (ref 96–108)
CHOLEST SERPL-MCNC: 127 MG/DL
CO2 SERPL-SCNC: 24 MMOL/L (ref 21–32)
CREAT SERPL-MCNC: 0.72 MG/DL (ref 0.6–1.3)
EOSINOPHIL # BLD AUTO: 0.14 THOUSAND/ÂΜL (ref 0–0.61)
EOSINOPHIL NFR BLD AUTO: 1 % (ref 0–6)
ERYTHROCYTE [DISTWIDTH] IN BLOOD BY AUTOMATED COUNT: 13.2 % (ref 11.6–15.1)
GFR SERPL CREATININE-BSD FRML MDRD: 109 ML/MIN/1.73SQ M
GLUCOSE P FAST SERPL-MCNC: 102 MG/DL (ref 65–99)
HCT VFR BLD AUTO: 50 % (ref 36.5–49.3)
HDLC SERPL-MCNC: 43 MG/DL
HGB BLD-MCNC: 15.9 G/DL (ref 12–17)
IMM GRANULOCYTES # BLD AUTO: 0.03 THOUSAND/UL (ref 0–0.2)
IMM GRANULOCYTES NFR BLD AUTO: 0 % (ref 0–2)
LDLC SERPL CALC-MCNC: 76 MG/DL (ref 0–100)
LYMPHOCYTES # BLD AUTO: 3.14 THOUSANDS/ÂΜL (ref 0.6–4.47)
LYMPHOCYTES NFR BLD AUTO: 31 % (ref 14–44)
MCH RBC QN AUTO: 28.9 PG (ref 26.8–34.3)
MCHC RBC AUTO-ENTMCNC: 31.8 G/DL (ref 31.4–37.4)
MCV RBC AUTO: 91 FL (ref 82–98)
MONOCYTES # BLD AUTO: 0.99 THOUSAND/ÂΜL (ref 0.17–1.22)
MONOCYTES NFR BLD AUTO: 10 % (ref 4–12)
NEUTROPHILS # BLD AUTO: 5.74 THOUSANDS/ÂΜL (ref 1.85–7.62)
NEUTS SEG NFR BLD AUTO: 57 % (ref 43–75)
NRBC BLD AUTO-RTO: 0 /100 WBCS
PLATELET # BLD AUTO: 290 THOUSANDS/UL (ref 149–390)
PMV BLD AUTO: 11 FL (ref 8.9–12.7)
POTASSIUM SERPL-SCNC: 4.2 MMOL/L (ref 3.5–5.3)
PROT SERPL-MCNC: 7 G/DL (ref 6.4–8.4)
RBC # BLD AUTO: 5.51 MILLION/UL (ref 3.88–5.62)
SODIUM SERPL-SCNC: 139 MMOL/L (ref 135–147)
TRIGL SERPL-MCNC: 41 MG/DL
WBC # BLD AUTO: 10.12 THOUSAND/UL (ref 4.31–10.16)

## 2024-05-11 PROCEDURE — 85025 COMPLETE CBC W/AUTO DIFF WBC: CPT

## 2024-05-11 PROCEDURE — 36415 COLL VENOUS BLD VENIPUNCTURE: CPT

## 2024-05-11 PROCEDURE — 80053 COMPREHEN METABOLIC PANEL: CPT

## 2024-05-11 PROCEDURE — 80061 LIPID PANEL: CPT

## 2024-05-15 ENCOUNTER — OFFICE VISIT (OUTPATIENT)
Dept: FAMILY MEDICINE CLINIC | Facility: CLINIC | Age: 49
End: 2024-05-15
Payer: COMMERCIAL

## 2024-05-15 VITALS
HEART RATE: 58 BPM | WEIGHT: 210 LBS | OXYGEN SATURATION: 98 % | TEMPERATURE: 96.6 F | BODY MASS INDEX: 30.06 KG/M2 | RESPIRATION RATE: 18 BRPM | SYSTOLIC BLOOD PRESSURE: 136 MMHG | DIASTOLIC BLOOD PRESSURE: 80 MMHG | HEIGHT: 70 IN

## 2024-05-15 DIAGNOSIS — Z00.00 ENCOUNTER FOR WELL ADULT EXAM WITHOUT ABNORMAL FINDINGS: Primary | ICD-10-CM

## 2024-05-15 DIAGNOSIS — R73.09 ELEVATED GLUCOSE: ICD-10-CM

## 2024-05-15 DIAGNOSIS — Z12.5 SCREENING PSA (PROSTATE SPECIFIC ANTIGEN): ICD-10-CM

## 2024-05-15 PROCEDURE — 99396 PREV VISIT EST AGE 40-64: CPT | Performed by: NURSE PRACTITIONER

## 2024-05-15 NOTE — PROGRESS NOTES
Adult Annual Physical  Name: Paul Melvin      : 1975      MRN: [id number]  Encounter Provider: EDY Pompa  Encounter Date: 5/15/2024   Encounter department: [facility]    Assessment & Plan   1. Encounter for well adult exam without abnormal findings  -     CBC and differential; Future; Expected date: 05/15/2024  -     Comprehensive metabolic panel; Future; Expected date: 05/15/2024  -     Lipid Panel with Direct LDL reflex; Future; Expected date: 05/15/2024  2. Screening PSA (prostate specific antigen)  -     PSA Total (Reflex To Free); Future  3. Elevated glucose  -     Hemoglobin A1C; Future    Immunizations and preventive care screenings were discussed with patient today. Appropriate education was printed on patient's after visit summary.        Counseling:  Alcohol/drug use: discussed moderation in alcohol intake, the recommendations for healthy alcohol use, and avoidance of illicit drug use.  Dental Health: discussed importance of regular tooth brushing, flossing, and dental visits.  Injury prevention: discussed safety/seat belts, safety helmets, smoke detectors, carbon dioxide detectors, and smoking near bedding or upholstery.  Sexual health: discussed sexually transmitted diseases, partner selection, use of condoms, avoidance of unintended pregnancy, and contraceptive alternatives.           History of Present Illness     Adult Annual Physical:  Patient presents for annual physical. Exercising 5 days a week, eating healthy meals, weight has been slowing trending down.   .     Diet and Physical Activity:  - Diet/Nutrition: well balanced diet.  - Exercise: strength training exercises and walking.    General Health:  - Sleep: sleeps well.  - Hearing: normal hearing bilateral ears.  - Vision: wears glasses.  - Dental: regular dental visits.    /GYN Health:    - History of STDs: no     Health:  - History of STDs: no.     Advanced Care Planning:  - Has an  "advanced directive?: no    - Has a durable medical POA?: no    - ACP document given to patient?: no      Review of Systems   Constitutional:  Negative for activity change, chills, fatigue and fever.   HENT:  Negative for congestion, ear discharge, ear pain, sinus pressure, sinus pain, sore throat, tinnitus and trouble swallowing.    Eyes:  Negative for photophobia, pain, discharge, itching and visual disturbance.   Respiratory:  Negative for cough, chest tightness, shortness of breath and wheezing.    Cardiovascular:  Negative for chest pain and leg swelling.   Gastrointestinal:  Negative for abdominal distention, abdominal pain, constipation, diarrhea, nausea and vomiting.   Endocrine: Negative for polydipsia, polyphagia and polyuria.   Genitourinary:  Negative for dysuria and frequency.   Musculoskeletal:  Negative for arthralgias, myalgias, neck pain and neck stiffness.   Skin:  Negative for color change.   Neurological:  Negative for dizziness, syncope, weakness, numbness and headaches.   Hematological:  Does not bruise/bleed easily.   Psychiatric/Behavioral:  Negative for behavioral problems, confusion, self-injury, sleep disturbance and suicidal ideas. The patient is not nervous/anxious.          Objective     /80 (BP Location: Left arm, Patient Position: Sitting, Cuff Size: Standard)   Pulse 58   Temp (!) 96.6 °F (35.9 °C) (Tympanic)   Resp 18   Ht 5' 10\" (1.778 m)   Wt 95.3 kg (210 lb)   SpO2 98%   BMI 30.13 kg/m²     Physical Exam  Vitals and nursing note reviewed.   Constitutional:       Appearance: Normal appearance.   HENT:      Head: Normocephalic and atraumatic.      Right Ear: Tympanic membrane, ear canal and external ear normal.      Left Ear: Tympanic membrane, ear canal and external ear normal.      Nose: Nose normal.   Eyes:      Extraocular Movements: Extraocular movements intact.      Pupils: Pupils are equal, round, and reactive to light.   Cardiovascular:      Rate and Rhythm: " Normal rate and regular rhythm.      Pulses: Normal pulses.   Pulmonary:      Effort: Pulmonary effort is normal.      Breath sounds: Normal breath sounds.   Abdominal:      Palpations: Abdomen is soft.   Musculoskeletal:         General: Normal range of motion.      Cervical back: Normal range of motion and neck supple.   Skin:     General: Skin is warm.   Neurological:      Mental Status: He is alert and oriented to person, place, and time.   Psychiatric:         Mood and Affect: Mood normal.         Behavior: Behavior normal.         Thought Content: Thought content normal.         Judgment: Judgment normal.

## 2024-10-04 ENCOUNTER — OFFICE VISIT (OUTPATIENT)
Dept: URGENT CARE | Facility: CLINIC | Age: 49
End: 2024-10-04
Payer: COMMERCIAL

## 2024-10-04 VITALS
HEART RATE: 58 BPM | DIASTOLIC BLOOD PRESSURE: 89 MMHG | RESPIRATION RATE: 18 BRPM | TEMPERATURE: 97.7 F | OXYGEN SATURATION: 96 % | SYSTOLIC BLOOD PRESSURE: 150 MMHG

## 2024-10-04 DIAGNOSIS — J06.9 ACUTE URI: Primary | ICD-10-CM

## 2024-10-04 PROCEDURE — 99213 OFFICE O/P EST LOW 20 MIN: CPT | Performed by: PHYSICAL MEDICINE & REHABILITATION

## 2024-10-04 RX ORDER — ZINC GLUCONATE 50 MG
50 TABLET ORAL DAILY
COMMUNITY

## 2024-10-04 NOTE — LETTER
October 4, 2024     Patient: Paul Melvin   YOB: 1975   Date of Visit: 10/4/2024       To Whom it May Concern:    Paul Melvin was seen in my clinic on 10/4/2024. He may return to work on 10/7/24 .    If you have any questions or concerns, please don't hesitate to call.         Sincerely,          Kaitlin Rubio PA-C        CC: No Recipients

## 2024-10-04 NOTE — PROGRESS NOTES
[facility]  [address] [place] [address]  Phone: [phone]                  Mildred Bender   2017 8:45 AM   Office Visit    Description:  Female : 1990   Provider:  Cedric Leroy MD   Department:  [facility]           Reason for Visit     Sore Throat           Diagnoses this Visit        Comments    Viral pharyngitis    -  Primary            To Do List           Goals (5 Years of Data)     None      Follow-Up and Disposition     Return if symptoms worsen or fail to improve.      [facility]     [facility] -  Assistance  Registered nurses in the [facility] Center provide clinical advisement, health education, appointment booking, and other advisory services.  Call for this free service at [phone].             Medications           Message regarding Medications     Verify the changes and/or additions to your medication regime listed below are the same as discussed with your clinician today.  If any of these changes or additions are incorrect, please notify your healthcare provider.        These medications were administered today        Dose Freq    dexamethasone injection 8 mg 8 mg Clinic/HOD 1 time    Sig: Inject 2 mLs (8 mg total) into the muscle one time.    Class: Normal    Route: Intramuscular           Verify that the below list of medications is an accurate representation of the medications you are currently taking.  If none reported, the list may be blank. If incorrect, please contact your healthcare provider. Carry this list with you in case of emergency.           Current Medications     levonorgestrel (MIRENA) 20 mcg/24 hour (5 years) IUD 1 each by Intrauterine route once. 3/7/13    phentermine (ADIPEX-P) 37.5 mg tablet Take 1 tablet (37.5 mg total) by mouth once daily.           Clinical Reference Information           Your Vitals Were     BP Pulse Temp Height Weight       114/82 (BP Location: Right    [facility]        NAME: Paul Melvin is a 49 y.o. male  : 1975    MRN: [id number]  DATE: 2024  TIME: 2:26 PM    Assessment and Plan   Acute URI [J06.9]  1. Acute URI              Patient Instructions     Vitamin D3 2000 IU daily.  Vitamin C 1000mg twice per day.  Multivitamin daily.  Some studies suggest that Zinc 12.5-15mg every 2 hours while awake x 5 days may shorten the duration cold symptoms by 1-2 days.   Fluids and rest.  Nasal saline spray; Afrin if severe congestion (do not use for more than 3 days)  Over the counter cough/cold medications as needed.   Flonase nasal spray.  Tylenol/Ibuprofen for pain/fever.  Salt water gargles and/or chloraseptic spray.  Warm tea with honey.  Warm compresses over sinuses.  Nasal rinses with distilled water.    Common symptoms and over the counter treatments:  For congestion: antihistamines for decongestants or allergy relief include: Benadryl, brompheniramine (Dimetapp),  doxylamine  Decongestant: Pseudoephedrine   Fever control: Acetaminophen or Ibuprofen   Cough suppressant- when your cough is dry : Dextromethorphan (Delysm, Robitussin)  Cough expectorant- when your cough is productive/wet: guaifenesin  (Mucinex)    Follow up with PCP in 3-5 days.  Proceed to  ER if symptoms worsen.    If tests are performed, our office will contact you with results only if changes need to made to the care plan discussed with you at the visit. You can review your full results on [facility].    Chief Complaint     Chief Complaint   Patient presents with    Nasal Congestion     C/o nasal/chest congestion, along with body aches, and sore throat. Taking mucinex DM Symptoms resolving states needs work note.          History of Present Illness       Patient presenting with nasal and chest congestion, body-aches, sore throat. He has been taking Mucinex DM which helps resolve symptoms.        Review of Systems   Review of Systems   Constitutional: Negative.     HENT:  Positive for congestion and sore throat.    Respiratory: Negative.     Cardiovascular: Negative.    Gastrointestinal: Negative.    Musculoskeletal:  Positive for myalgias.         Current Medications       Current Outpatient Medications:     Multiple Vitamin (MULTIVITAMIN ADULT PO), Take by mouth, Disp: , Rfl:     sildenafil (VIAGRA) 100 mg tablet, sildenafil 100 mg tablet  take 1 tablet by mouth PRIOR TO ACTIVITY, Disp: , Rfl:     zinc gluconate 50 mg tablet, Take 50 mg by mouth daily, Disp: , Rfl:     Acetaminophen (TYLENOL PO), Take by mouth (Patient not taking: Reported on 2/28/2024), Disp: , Rfl:     ALPRAZolam (XANAX) 2 MG tablet, Take 1 tablet (2 mg total) by mouth daily at bedtime as needed for anxiety (Patient not taking: Reported on 2/28/2024), Disp: 1 tablet, Rfl: 0    b complex vitamins capsule, Take 1 capsule by mouth daily (Patient not taking: Reported on 10/4/2024), Disp: , Rfl:     cholecalciferol (VITAMIN D3) 400 units tablet, Take 400 Units by mouth daily Taking 5000 IU (Patient not taking: Reported on 10/4/2024), Disp: , Rfl:     Coenzyme Q10 100 MG TABS, Take by mouth (Patient not taking: Reported on 5/15/2024), Disp: , Rfl:     Ibuprofen (MOTRIN PO), Take by mouth (Patient not taking: Reported on 2/28/2024), Disp: , Rfl:     MAGNESIUM PO, Take by mouth 350 daily (Patient not taking: Reported on 10/4/2024), Disp: , Rfl:     Omega-3 1000 MG CAPS, Take 500 mg by mouth daily (Patient not taking: Reported on 10/4/2024), Disp: , Rfl:     vardenafil (LEVITRA) 20 MG tablet, vardenafil 20 mg tablet  take 1 tablet by mouth if needed (Patient not taking: Reported on 2/28/2024), Disp: , Rfl:     Current Allergies     Allergies as of 10/04/2024    (No Known Allergies)            The following portions of the patient's history were reviewed and updated as appropriate: allergies, current medications, past family history, past medical history, past social history, past surgical history and problem  "arm, Patient Position: Sitting, BP Method: Manual) 76 97.9 °F (36.6 °C) (Oral) 5' 7" (1.702 m) 119.6 kg (263 lb 10.7 oz)     SpO2 BMI             98% 41.3 kg/m2         Blood Pressure          Most Recent Value    BP  114/82      Allergies as of 2/13/2017     Amoxicillin      Immunizations Administered on Date of Encounter - 2/13/2017     None      Orders Placed During Today's Visit      Normal Orders This Visit    POCT RAPID STREP A       Smoking Cessation     If you would like to quit smoking:   You may be eligible for free services if you are a [place] resident and started smoking cigarettes before September 1, 1988.  Call the Smoking Cessation [employer] ([facility]) toll free at [phone] or [phone].   Call 7-765-QUIT-NOW if you do not meet the above criteria.            Language Assistance Services     ATTENTION: Language assistance services are available, free of charge. Please call [phone].      ATENCIÓN: Si habla español, tiene a castelan disposición servicios gratuitos de asistencia lingüística. Llame al [phone].     CHÚ Ý: N?u b?n nói Ti?ng Vi?t, có các d?ch v? h? tr? ngôn ng? mi?n phí dành cho b?n. G?i s? [phone].         [facility] complies with applicable Federal civil rights laws and does not discriminate on the basis of race, color, national origin, age, disability, or sex.        " list.     Past Medical History:   Diagnosis Date    Asthma     childhood    Hypertension     Kidney stone        Past Surgical History:   Procedure Laterality Date    COLONOSCOPY      HEMORRHOID SURGERY      KNEE SURGERY Left     ID CYSTO/URETERO W/LITHOTRIPSY &INDWELL STENT INSRT Right 06/08/2017    Procedure: CYSTOSCOPY; URETEROSCOPY; RETROGRADE PYELOGRAM; HOLMIUM LASER LITHOTRIPSY; BASKET STONE EXTRACTION; STENT PLACEMENT ;  Surgeon: Maximiliano Callaway MD;  Location: [facility];  Service: Urology    ID RINSJ RPTD BICEPS/TRICEPS TDN DSTL W/WO TDN GRF Left 01/08/2020    Procedure: REPAIR TENDON BICEPS left;  Surgeon: Quentin Moyer MD;  Location: [facility];  Service: Orthopedics    TREATMENT FISTULA ANAL         Family History   Problem Relation Age of Onset    No Known Problems Father     No Known Problems Mother     No Known Problems Sister     No Known Problems Daughter          Medications have been verified.        Objective   /89   Pulse 58   Temp 97.7 °F (36.5 °C)   Resp 18   SpO2 96%        Physical Exam     Physical Exam  Constitutional:       General: He is not in acute distress.     Appearance: He is ill-appearing.   HENT:      Right Ear: Tympanic membrane normal.      Left Ear: Tympanic membrane normal.      Nose: Congestion present. No rhinorrhea.      Mouth/Throat:      Mouth: Mucous membranes are moist.      Pharynx: Oropharynx is clear. No oropharyngeal exudate or posterior oropharyngeal erythema.   Eyes:      Conjunctiva/sclera: Conjunctivae normal.   Cardiovascular:      Rate and Rhythm: Normal rate and regular rhythm.      Heart sounds: Normal heart sounds.   Pulmonary:      Effort: Pulmonary effort is normal. No respiratory distress.      Breath sounds: Normal breath sounds. No wheezing, rhonchi or rales.   Musculoskeletal:      Cervical back: Normal range of motion and neck supple.   Lymphadenopathy:      Cervical: No cervical adenopathy.   Skin:     General: Skin is warm.   Neurological:       Mental Status: He is alert.   Psychiatric:         Mood and Affect: Mood normal.         Behavior: Behavior normal.

## 2024-11-23 ENCOUNTER — APPOINTMENT (OUTPATIENT)
Dept: LAB | Facility: HOSPITAL | Age: 49
End: 2024-11-23
Payer: COMMERCIAL

## 2024-11-23 ENCOUNTER — RESULTS FOLLOW-UP (OUTPATIENT)
Dept: FAMILY MEDICINE CLINIC | Facility: CLINIC | Age: 49
End: 2024-11-23

## 2024-11-23 DIAGNOSIS — Z12.5 SCREENING PSA (PROSTATE SPECIFIC ANTIGEN): ICD-10-CM

## 2024-11-23 DIAGNOSIS — R73.09 ELEVATED GLUCOSE: ICD-10-CM

## 2024-11-23 DIAGNOSIS — Z00.00 ENCOUNTER FOR WELL ADULT EXAM WITHOUT ABNORMAL FINDINGS: ICD-10-CM

## 2024-11-23 LAB
ALBUMIN SERPL BCG-MCNC: 4.5 G/DL (ref 3.5–5)
ALP SERPL-CCNC: 51 U/L (ref 34–104)
ALT SERPL W P-5'-P-CCNC: 23 U/L (ref 7–52)
ANION GAP SERPL CALCULATED.3IONS-SCNC: 7 MMOL/L (ref 4–13)
AST SERPL W P-5'-P-CCNC: 18 U/L (ref 13–39)
BASOPHILS # BLD AUTO: 0.06 THOUSANDS/ΜL (ref 0–0.1)
BASOPHILS NFR BLD AUTO: 1 % (ref 0–1)
BILIRUB SERPL-MCNC: 0.54 MG/DL (ref 0.2–1)
BUN SERPL-MCNC: 13 MG/DL (ref 5–25)
CALCIUM SERPL-MCNC: 9.6 MG/DL (ref 8.4–10.2)
CHLORIDE SERPL-SCNC: 103 MMOL/L (ref 96–108)
CHOLEST SERPL-MCNC: 138 MG/DL (ref ?–200)
CO2 SERPL-SCNC: 28 MMOL/L (ref 21–32)
CREAT SERPL-MCNC: 0.73 MG/DL (ref 0.6–1.3)
EOSINOPHIL # BLD AUTO: 0.11 THOUSAND/ΜL (ref 0–0.61)
EOSINOPHIL NFR BLD AUTO: 1 % (ref 0–6)
ERYTHROCYTE [DISTWIDTH] IN BLOOD BY AUTOMATED COUNT: 12.5 % (ref 11.6–15.1)
GFR SERPL CREATININE-BSD FRML MDRD: 108 ML/MIN/1.73SQ M
GLUCOSE P FAST SERPL-MCNC: 95 MG/DL (ref 65–99)
HCT VFR BLD AUTO: 49.8 % (ref 36.5–49.3)
HDLC SERPL-MCNC: 45 MG/DL
HGB BLD-MCNC: 16.1 G/DL (ref 12–17)
IMM GRANULOCYTES # BLD AUTO: 0.02 THOUSAND/UL (ref 0–0.2)
IMM GRANULOCYTES NFR BLD AUTO: 0 % (ref 0–2)
LDLC SERPL CALC-MCNC: 84 MG/DL (ref 0–100)
LYMPHOCYTES # BLD AUTO: 2.53 THOUSANDS/ΜL (ref 0.6–4.47)
LYMPHOCYTES NFR BLD AUTO: 30 % (ref 14–44)
MCH RBC QN AUTO: 28.5 PG (ref 26.8–34.3)
MCHC RBC AUTO-ENTMCNC: 32.3 G/DL (ref 31.4–37.4)
MCV RBC AUTO: 88 FL (ref 82–98)
MONOCYTES # BLD AUTO: 0.75 THOUSAND/ΜL (ref 0.17–1.22)
MONOCYTES NFR BLD AUTO: 9 % (ref 4–12)
NEUTROPHILS # BLD AUTO: 4.89 THOUSANDS/ΜL (ref 1.85–7.62)
NEUTS SEG NFR BLD AUTO: 59 % (ref 43–75)
NRBC BLD AUTO-RTO: 0 /100 WBCS
PLATELET # BLD AUTO: 288 THOUSANDS/UL (ref 149–390)
PMV BLD AUTO: 10.8 FL (ref 8.9–12.7)
POTASSIUM SERPL-SCNC: 4.2 MMOL/L (ref 3.5–5.3)
PROT SERPL-MCNC: 7.4 G/DL (ref 6.4–8.4)
RBC # BLD AUTO: 5.64 MILLION/UL (ref 3.88–5.62)
SODIUM SERPL-SCNC: 138 MMOL/L (ref 135–147)
TRIGL SERPL-MCNC: 44 MG/DL (ref ?–150)
WBC # BLD AUTO: 8.36 THOUSAND/UL (ref 4.31–10.16)

## 2024-11-23 PROCEDURE — 84153 ASSAY OF PSA TOTAL: CPT

## 2024-11-23 PROCEDURE — 85025 COMPLETE CBC W/AUTO DIFF WBC: CPT

## 2024-11-23 PROCEDURE — 36415 COLL VENOUS BLD VENIPUNCTURE: CPT

## 2024-11-23 PROCEDURE — 80053 COMPREHEN METABOLIC PANEL: CPT

## 2024-11-23 PROCEDURE — 84154 ASSAY OF PSA FREE: CPT

## 2024-11-23 PROCEDURE — 83036 HEMOGLOBIN GLYCOSYLATED A1C: CPT

## 2024-11-23 PROCEDURE — 80061 LIPID PANEL: CPT

## 2024-11-24 LAB
EST. AVERAGE GLUCOSE BLD GHB EST-MCNC: 126 MG/DL
HBA1C MFR BLD: 6 %
PSA FREE MFR SERPL: 36.36 %
PSA FREE SERPL-MCNC: 0.26 NG/ML
PSA SERPL-MCNC: 0.71 NG/ML (ref 0–4)

## 2024-12-12 NOTE — TELEPHONE ENCOUNTER
Pt lvm stating she needs a refill on her tacrolimus mg,furosemide 40mg, and spironolactone.    US not finalized   Office to monitor

## 2024-12-17 ENCOUNTER — RA CDI HCC (OUTPATIENT)
Dept: OTHER | Facility: HOSPITAL | Age: 49
End: 2024-12-17

## 2024-12-17 PROBLEM — S46.212A RUPTURE OF LEFT DISTAL BICEPS TENDON: Status: RESOLVED | Noted: 2020-01-06 | Resolved: 2024-12-17

## 2024-12-24 ENCOUNTER — OFFICE VISIT (OUTPATIENT)
Dept: FAMILY MEDICINE CLINIC | Facility: CLINIC | Age: 49
End: 2024-12-24
Payer: COMMERCIAL

## 2024-12-24 VITALS
WEIGHT: 212.4 LBS | HEART RATE: 62 BPM | BODY MASS INDEX: 30.41 KG/M2 | RESPIRATION RATE: 18 BRPM | OXYGEN SATURATION: 98 % | TEMPERATURE: 97 F | SYSTOLIC BLOOD PRESSURE: 136 MMHG | HEIGHT: 70 IN | DIASTOLIC BLOOD PRESSURE: 70 MMHG

## 2024-12-24 DIAGNOSIS — E66.811 CLASS 1 OBESITY DUE TO EXCESS CALORIES WITHOUT SERIOUS COMORBIDITY WITH BODY MASS INDEX (BMI) OF 31.0 TO 31.9 IN ADULT: ICD-10-CM

## 2024-12-24 DIAGNOSIS — E66.09 CLASS 1 OBESITY DUE TO EXCESS CALORIES WITHOUT SERIOUS COMORBIDITY WITH BODY MASS INDEX (BMI) OF 31.0 TO 31.9 IN ADULT: ICD-10-CM

## 2024-12-24 DIAGNOSIS — I10 BENIGN HYPERTENSION: Primary | ICD-10-CM

## 2024-12-24 DIAGNOSIS — R73.03 PREDIABETES: ICD-10-CM

## 2024-12-24 PROCEDURE — 99214 OFFICE O/P EST MOD 30 MIN: CPT | Performed by: NURSE PRACTITIONER

## 2024-12-24 NOTE — ASSESSMENT & PLAN NOTE
Documented high blood pressure in urgent care in October.   Remains active, cardio. Intermittent fasting. Off during holiday

## 2024-12-24 NOTE — ASSESSMENT & PLAN NOTE
Has reduce sugar (soda), remains active, 10,000-15,000 steps a day  Continue with lifestyle changes.     Orders:    CBC and differential; Future    Comprehensive metabolic panel; Future    Lipid Panel with Direct LDL reflex; Future

## 2024-12-24 NOTE — PROGRESS NOTES
Name: Paul Melvin      : 1975      MRN: [id number]  Encounter Provider: Brunilda Alarcon DNP  Encounter Date: 2024   Encounter department: [facility]  :  Assessment & Plan  Benign hypertension  Documented high blood pressure in urgent care in October.   Remains active, cardio. Intermittent fasting. Off during holiday          Class 1 obesity due to excess calories without serious comorbidity with body mass index (BMI) of 31.0 to 31.9 in adult  Has reduce sugar (soda), remains active, 10,000-15,000 steps a day  Continue with lifestyle changes.     Orders:    CBC and differential; Future    Comprehensive metabolic panel; Future    Lipid Panel with Direct LDL reflex; Future    Prediabetes  Continue to monitor diet and continue to be active   Orders:    Hemoglobin A1C; Future           History of Present Illness     49 year old here today for routine follow up, no complaints.       Review of Systems   Constitutional:  Negative for activity change, chills, fatigue and fever.   HENT:  Negative for congestion, ear discharge, ear pain, sinus pressure, sinus pain, sore throat, tinnitus and trouble swallowing.    Eyes:  Negative for photophobia, pain, discharge, itching and visual disturbance.   Respiratory:  Negative for cough, chest tightness, shortness of breath and wheezing.    Cardiovascular:  Negative for chest pain and leg swelling.   Gastrointestinal:  Negative for abdominal distention, abdominal pain, constipation, diarrhea, nausea and vomiting.   Endocrine: Negative for polydipsia, polyphagia and polyuria.   Genitourinary:  Negative for dysuria and frequency.   Musculoskeletal:  Negative for arthralgias, myalgias, neck pain and neck stiffness.   Skin:  Negative for color change.   Neurological:  Negative for dizziness, syncope, weakness, numbness and headaches.   Hematological:  Does not bruise/bleed easily.   Psychiatric/Behavioral:  Negative for behavioral problems,  "confusion, self-injury, sleep disturbance and suicidal ideas. The patient is not nervous/anxious.        Objective   /70 (BP Location: Left arm, Patient Position: Sitting, Cuff Size: Standard)   Pulse 62   Temp (!) 97 °F (36.1 °C) (Tympanic)   Resp 18   Ht 5' 10\" (1.778 m)   Wt 96.3 kg (212 lb 6.4 oz)   SpO2 98%   BMI 30.48 kg/m²      Physical Exam  Vitals and nursing note reviewed.   Constitutional:       Appearance: Normal appearance.   HENT:      Head: Normocephalic and atraumatic.   Cardiovascular:      Rate and Rhythm: Normal rate and regular rhythm.      Pulses: Normal pulses.      Heart sounds: Normal heart sounds.   Pulmonary:      Effort: Pulmonary effort is normal.      Breath sounds: Normal breath sounds.   Musculoskeletal:         General: Normal range of motion.   Skin:     General: Skin is warm.   Neurological:      General: No focal deficit present.      Mental Status: He is alert and oriented to person, place, and time.   Psychiatric:         Mood and Affect: Mood normal.         Behavior: Behavior normal.         Thought Content: Thought content normal.         Judgment: Judgment normal.         "

## 2025-01-30 ENCOUNTER — VBI (OUTPATIENT)
Dept: ADMINISTRATIVE | Facility: OTHER | Age: 50
End: 2025-01-30

## 2025-01-30 NOTE — TELEPHONE ENCOUNTER
01/30/25 10:01 AM     Chart reviewed for Blood Pressure was/were submitted to the patient's insurance.     Yaw Bullock MA   [facility]

## 2025-03-19 ENCOUNTER — OFFICE VISIT (OUTPATIENT)
Dept: FAMILY MEDICINE CLINIC | Facility: CLINIC | Age: 50
End: 2025-03-19
Payer: COMMERCIAL

## 2025-03-19 VITALS
RESPIRATION RATE: 18 BRPM | HEART RATE: 62 BPM | WEIGHT: 209.4 LBS | HEIGHT: 70 IN | DIASTOLIC BLOOD PRESSURE: 83 MMHG | BODY MASS INDEX: 29.98 KG/M2 | OXYGEN SATURATION: 98 % | SYSTOLIC BLOOD PRESSURE: 132 MMHG | TEMPERATURE: 96.6 F

## 2025-03-19 DIAGNOSIS — I10 BENIGN HYPERTENSION: ICD-10-CM

## 2025-03-19 DIAGNOSIS — Z56.6 STRESS AT WORK: ICD-10-CM

## 2025-03-19 DIAGNOSIS — E66.09 CLASS 1 OBESITY DUE TO EXCESS CALORIES WITHOUT SERIOUS COMORBIDITY WITH BODY MASS INDEX (BMI) OF 31.0 TO 31.9 IN ADULT: Primary | ICD-10-CM

## 2025-03-19 DIAGNOSIS — E66.811 CLASS 1 OBESITY DUE TO EXCESS CALORIES WITHOUT SERIOUS COMORBIDITY WITH BODY MASS INDEX (BMI) OF 31.0 TO 31.9 IN ADULT: Primary | ICD-10-CM

## 2025-03-19 PROCEDURE — 99214 OFFICE O/P EST MOD 30 MIN: CPT | Performed by: NURSE PRACTITIONER

## 2025-03-19 SDOH — HEALTH STABILITY - MENTAL HEALTH: OTHER PHYSICAL AND MENTAL STRAIN RELATED TO WORK: Z56.6

## 2025-03-19 NOTE — LETTER
March 19, 2025     Patient: Paul Melvin  YOB: 1975  Date of Visit: 3/19/2025      To Whom it May Concern:    Paul Melvin is under my professional care. Paul was seen in my office on 3/19/2025.     If you have any questions or concerns, please don't hesitate to call.         Sincerely,          Brunilda Alarcon DNP        CC: No Recipients

## 2025-03-19 NOTE — PROGRESS NOTES
Name: Pual Melvin      : 1975      MRN: [id number]  Encounter Provider: Brunilda Alarcon DNP  Encounter Date: 3/19/2025   Encounter department: [facility]  :  Assessment & Plan  Class 1 obesity due to excess calories without serious comorbidity with body mass index (BMI) of 31.0 to 31.9 in adult  Has been making improvements, walking, stretching, lifestyle changes.        Benign hypertension  Elevated today, continue with lifestyle changes       Stress at work  Set boundaries, delegate work to other staff, talk with supervisor               History of Present Illness   Increase stress at home and work. He report working 60 hours a week, trying to stay active. He reports lack of sleep.   Has used otc supplements to help sleep.     Anxiety  Patient reports no chest pain, confusion, dizziness, nausea, nervous/anxious behavior, shortness of breath or suicidal ideas.       Depression  Associated symptoms include fatigue. Pertinent negatives include no abdominal pain, arthralgias, chest pain, chills, congestion, coughing, fever, headaches, myalgias, nausea, neck pain, numbness, sore throat, vomiting or weakness.     Review of Systems   Constitutional:  Positive for fatigue. Negative for activity change, chills and fever.   HENT:  Negative for congestion, ear discharge, ear pain, sinus pressure, sinus pain, sore throat, tinnitus and trouble swallowing.    Eyes:  Negative for photophobia, pain, discharge, itching and visual disturbance.   Respiratory:  Negative for cough, chest tightness, shortness of breath and wheezing.    Cardiovascular:  Negative for chest pain and leg swelling.   Gastrointestinal:  Negative for abdominal distention, abdominal pain, constipation, diarrhea, nausea and vomiting.   Endocrine: Negative for polydipsia, polyphagia and polyuria.   Genitourinary:  Negative for dysuria and frequency.   Musculoskeletal:  Negative for arthralgias, myalgias, neck pain and  "neck stiffness.   Skin:  Negative for color change.   Neurological:  Negative for dizziness, syncope, weakness, numbness and headaches.   Hematological:  Does not bruise/bleed easily.   Psychiatric/Behavioral:  Positive for depression and sleep disturbance. Negative for behavioral problems, confusion, self-injury and suicidal ideas. The patient is not nervous/anxious.        Objective   /83 (BP Location: Left arm, Patient Position: Sitting, Cuff Size: Standard) Comment: home blood pressure checks regular  Pulse 62   Temp (!) 96.6 °F (35.9 °C) (Tympanic)   Resp 18   Ht 5' 10\" (1.778 m)   Wt 95 kg (209 lb 6.4 oz)   SpO2 98%   BMI 30.05 kg/m²      Physical Exam  Vitals and nursing note reviewed.   Constitutional:       Appearance: He is obese.   HENT:      Head: Normocephalic and atraumatic.   Cardiovascular:      Rate and Rhythm: Normal rate and regular rhythm.      Pulses: Normal pulses.      Heart sounds: Normal heart sounds.   Pulmonary:      Effort: Pulmonary effort is normal.      Breath sounds: Normal breath sounds.   Skin:     General: Skin is warm.   Neurological:      General: No focal deficit present.      Mental Status: He is alert and oriented to person, place, and time.         "

## 2025-04-25 ENCOUNTER — OFFICE VISIT (OUTPATIENT)
Dept: FAMILY MEDICINE CLINIC | Facility: CLINIC | Age: 50
End: 2025-04-25
Payer: COMMERCIAL

## 2025-04-25 VITALS
SYSTOLIC BLOOD PRESSURE: 132 MMHG | RESPIRATION RATE: 20 BRPM | OXYGEN SATURATION: 98 % | HEIGHT: 70 IN | HEART RATE: 67 BPM | WEIGHT: 218.5 LBS | DIASTOLIC BLOOD PRESSURE: 80 MMHG | TEMPERATURE: 97.7 F | BODY MASS INDEX: 31.28 KG/M2

## 2025-04-25 DIAGNOSIS — S20.469A INSECT BITE OF BACK, UNSPECIFIED LATERALITY, INITIAL ENCOUNTER: ICD-10-CM

## 2025-04-25 DIAGNOSIS — M25.421 EFFUSION OF BURSA OF RIGHT ELBOW: Primary | ICD-10-CM

## 2025-04-25 PROCEDURE — 99214 OFFICE O/P EST MOD 30 MIN: CPT | Performed by: NURSE PRACTITIONER

## 2025-04-25 RX ORDER — DOXYCYCLINE HYCLATE 100 MG
100 TABLET ORAL 2 TIMES DAILY
Qty: 28 TABLET | Refills: 0 | Status: SHIPPED | OUTPATIENT
Start: 2025-04-25 | End: 2025-05-09

## 2025-04-25 NOTE — PROGRESS NOTES
Name: Paul Melvin      : 1975      MRN: [id number]  Encounter Provider: Brunilda Alarcon DNP  Encounter Date: 2025   Encounter department: [facility]  :  Assessment & Plan  Effusion of bursa of right elbow  Ace wrap in office, continue to ice, motrin         Insect bite of back, unspecified laterality, initial encounter    Orders:    Lyme Total AB W Reflex to IGM/IGG; Future    doxycycline hyclate (VIBRA-TABS) 100 mg tablet; Take 1 tablet (100 mg total) by mouth 2 (two) times a day for 14 days           History of Present Illness   3 weeks ago he reports hitting his elbow, cannot recall actual injury, noticed swelling to right elbow  Also reports a tick bite, sent out to lab, three day ago       Review of Systems   Constitutional:  Negative for activity change, chills, fatigue and fever.   HENT:  Negative for congestion, ear discharge, ear pain, sinus pressure, sinus pain, sore throat, tinnitus and trouble swallowing.    Eyes:  Negative for photophobia, pain, discharge, itching and visual disturbance.   Respiratory:  Negative for cough, chest tightness, shortness of breath and wheezing.    Cardiovascular:  Negative for chest pain and leg swelling.   Gastrointestinal:  Negative for abdominal distention, abdominal pain, constipation, diarrhea, nausea and vomiting.   Endocrine: Negative for polydipsia, polyphagia and polyuria.   Genitourinary:  Negative for dysuria and frequency.   Musculoskeletal:  Positive for arthralgias and joint swelling. Negative for myalgias, neck pain and neck stiffness.   Skin:  Negative for color change.   Neurological:  Negative for dizziness, syncope, weakness, numbness and headaches.   Hematological:  Does not bruise/bleed easily.   Psychiatric/Behavioral:  Negative for behavioral problems, confusion, self-injury, sleep disturbance and suicidal ideas. The patient is not nervous/anxious.        Objective   /80 (BP Location: Left arm,  "Patient Position: Sitting, Cuff Size: Standard)   Pulse 67   Temp 97.7 °F (36.5 °C) (Tympanic)   Resp 20   Ht 5' 10\" (1.778 m)   Wt 99.1 kg (218 lb 8 oz)   SpO2 98%   BMI 31.35 kg/m²      Physical Exam  Vitals and nursing note reviewed.   Constitutional:       Appearance: Normal appearance.   HENT:      Head: Normocephalic and atraumatic.   Cardiovascular:      Rate and Rhythm: Normal rate and regular rhythm.      Pulses: Normal pulses.      Heart sounds: Normal heart sounds.   Pulmonary:      Effort: Pulmonary effort is normal.      Breath sounds: Normal breath sounds.   Musculoskeletal:         General: Swelling present.      Comments: Right elbow   Skin:     General: Skin is warm.          Neurological:      General: No focal deficit present.      Mental Status: He is alert and oriented to person, place, and time.   Psychiatric:         Behavior: Behavior normal.         "

## 2025-04-26 ENCOUNTER — APPOINTMENT (OUTPATIENT)
Dept: LAB | Facility: CLINIC | Age: 50
End: 2025-04-26
Payer: COMMERCIAL

## 2025-04-26 DIAGNOSIS — S20.469A INSECT BITE OF BACK, UNSPECIFIED LATERALITY, INITIAL ENCOUNTER: ICD-10-CM

## 2025-04-26 PROCEDURE — 36415 COLL VENOUS BLD VENIPUNCTURE: CPT

## 2025-04-26 PROCEDURE — 86618 LYME DISEASE ANTIBODY: CPT

## 2025-04-27 ENCOUNTER — RESULTS FOLLOW-UP (OUTPATIENT)
Dept: FAMILY MEDICINE CLINIC | Facility: CLINIC | Age: 50
End: 2025-04-27

## 2025-04-27 LAB — B BURGDOR IGG+IGM SER QL IA: NEGATIVE

## 2025-05-05 ENCOUNTER — TELEPHONE (OUTPATIENT)
Age: 50
End: 2025-05-05

## 2025-05-05 DIAGNOSIS — M25.421 EFFUSION OF BURSA OF RIGHT ELBOW: Primary | ICD-10-CM

## 2025-05-05 NOTE — TELEPHONE ENCOUNTER
Patient requesting a referral to orthopedics. Patient was seen in office on 04/25/25 has fluids in his right elbow patient was advise that if the swelling does go down to contact PCP. Patient stated the swelling is still there and would like to have it drained. Pls advise patient when referral hs been placed.

## 2025-05-08 DIAGNOSIS — Z00.6 ENCOUNTER FOR EXAMINATION FOR NORMAL COMPARISON OR CONTROL IN CLINICAL RESEARCH PROGRAM: ICD-10-CM

## 2025-05-13 ENCOUNTER — APPOINTMENT (OUTPATIENT)
Dept: RADIOLOGY | Facility: AMBULARY SURGERY CENTER | Age: 50
End: 2025-05-13
Attending: PHYSICAL MEDICINE & REHABILITATION
Payer: COMMERCIAL

## 2025-05-13 ENCOUNTER — OFFICE VISIT (OUTPATIENT)
Dept: OBGYN CLINIC | Facility: CLINIC | Age: 50
End: 2025-05-13
Payer: COMMERCIAL

## 2025-05-13 VITALS — WEIGHT: 218 LBS | BODY MASS INDEX: 31.21 KG/M2 | HEIGHT: 70 IN

## 2025-05-13 DIAGNOSIS — M25.421 EFFUSION OF BURSA OF RIGHT ELBOW: ICD-10-CM

## 2025-05-13 PROCEDURE — 99204 OFFICE O/P NEW MOD 45 MIN: CPT | Performed by: PHYSICAL MEDICINE & REHABILITATION

## 2025-05-13 PROCEDURE — 73080 X-RAY EXAM OF ELBOW: CPT

## 2025-05-13 NOTE — PROGRESS NOTES
Assessment & Plan  Effusion of bursa of right elbow  Right elbow pain likely secondary to traumatic noninfectious olecranon bursitis.  We discussed different treatment options and decided to proceed with pression wrap, diclofenac gel and activity modification to avoid just aspiration but this has us risks including infection.  Since he is pain-free and active, we decided to hold off.  I will see him back in 3 weeks if needed.  If this becomes symptomatic, could consider aspiration.  Orders:    Ambulatory Referral to Orthopedic Surgery    XR elbow 3+ vw right; Future    Imaging Studies (I personally reviewed images in PACS and report):  Right elbow x-rays most recent to this encounter reviewed.  These images show soft tissue swelling at the olecranon region with a tiny radiodensity that could represent calcification or foreign body.    Return in about 3 weeks (around 6/3/2025).    Patient is in agreement with the above plan.    HPI:  Paul Melvin is a 49 y.o. male  who presents for evaluation of   Chief Complaint   Patient presents with    Right Elbow - Pain, Swelling       Onset/Mechanism: Pain for past few weeks after bumping it a few times.  Location: Elbow.  Radiation: Denies.  Provocative: Bumping it.  Severity: Not very painful.  Associated Symptoms: Swelling and discomfort.  Treatment so far: Denies.    Following history reviewed and updated:  Past Medical History:   Diagnosis Date    Asthma     childhood    Hypertension     Kidney stone      Past Surgical History:   Procedure Laterality Date    COLONOSCOPY      HEMORRHOID SURGERY      KNEE SURGERY Left     [place] CYSTO/URETERO W/LITHOTRIPSY &INDWELL STENT INSRT Right 06/08/2017    Procedure: CYSTOSCOPY; URETEROSCOPY; RETROGRADE PYELOGRAM; HOLMIUM LASER LITHOTRIPSY; BASKET STONE EXTRACTION; STENT PLACEMENT ;  Surgeon: Maximiliano Callaway MD;  Location: [facility];  Service: Urology    WV RINSJ RPTD BICEPS/TRICEPS TDN DSTL W/WO TDN GRF Left 01/08/2020    Procedure:  Chart reviewed  Have initiated therapy with oxygen  SHe is not a candidate for Revatio due to usage of nitrates  Will call her into clinic and discuss Suzette Pulido      Subjective:       Patient ID: Marcel Montalvo is a 77 y.o. female.    Chief Complaint: She       No chief complaint on file.    HPI     Dyspnea  Patient complains of shortness of breath. Symptoms occur while getting dressed, with one block walking. Symptoms began 1 year ago, gradually worsening since. Associated symptoms include  difficulty breathing, drainage from nose, dyspnea on exertion, dyspnea when laying down, frequent throat clearing, morning cough, post nasal drip, shortness of breath and sputum production.Occasional chest pain/pressure. Hx of infection/ pneumonia 10 months ago. She denies hemoptysis from lungs and unresolving pneumonia. She does not have had recent travel. Weight has decreased 10 pounds over last few months from diuretics. Symptoms are exacerbated by any exercise. Symptoms are alleviated by rest.   Uses oxygen at night  Hospitalizations for Atrial Fibrillation x 3 in past year      Congestive Heart Failure:  Patient presents for re-evaluation of congestive heart failure. Patient's current complaints are chest pressure/discomfort, dyspnea, fatigue, irregular heart beat, lower extremity edema and ascities. She denies chest pain. She states she is compliant most of the time with her medications. She states she is compliant most of the time with her diet.    Lung Nodule  She presents for evaluation and treatment of a lung nodule. The patient reports that the imaging was performed to evaluate symptoms of dyspnea on exertion which have been present for 1 year and are gradually worsening. Symptoms are exacerbated by exercise and walking and relieved by rest. The patient denies other associated symptoms. She has a history of 12.5 pack years. The patient has no known exposure to tuberculosis. The patient does not have a history  "REPAIR TENDON BICEPS left;  Surgeon: Quentin Moyer MD;  Location: [facility];  Service: Orthopedics    TREATMENT FISTULA ANAL       Social History   Social History     Substance and Sexual Activity   Alcohol Use Not Currently    Alcohol/week: 6.0 standard drinks of alcohol    Types: 6 Cans of beer per week    Comment: Has'nt drank in over a year      Social History     Substance and Sexual Activity   Drug Use No     Social History     Tobacco Use   Smoking Status Former    Current packs/day: 0.00    Average packs/day: 0.5 packs/day for 6.0 years (3.0 ttl pk-yrs)    Types: Cigarettes    Start date:     Quit date:     Years since quittin.3    Passive exposure: Past   Smokeless Tobacco Never   Tobacco Comments    quit when 23 y.o.     Family History   Problem Relation Age of Onset    No Known Problems Father     No Known Problems Mother     No Known Problems Sister     No Known Problems Daughter      No Known Allergies     Constitutional:  Ht 5' 10\" (1.778 m)   Wt 98.9 kg (218 lb)   BMI 31.28 kg/m²    General: NAD.  Eyes: Anicteric sclerae.  Neck: Supple.  Lungs: Unlabored breathing.  Cardiovascular: No lower extremity edema.  Skin: Intact without erythema.  Neurologic: Sensation intact to light touch.  Psychiatric: Mood and affect are appropriate.    Right Elbow Exam     Range of Motion   The patient has normal right elbow ROM.    Other   Erythema: absent  Scars: absent  Sensation: normal  Pulse: present    Comments:  Soft tissue swelling at olecranon bursa. No signs of infection.             Procedures              " of cancer.   PET scan was negative    Past Medical History:   Diagnosis Date    *Atrial fibrillation     AF (atrial fibrillation)     Palpation: atrial fibrillation, on Coumadin and followed by cardiologist.    Anticoagulant long-term use     Anxiety     Aortic insufficiency 3/19/2014    Aortic regurgitation     Aortic regurgitation/tricuspid regurgitation per MARIO in April 2007.     Asthma     Carotid artery stenosis     CEA on the left by Dr. Maciel    Coronary artery disease     status post CABG X 3.     Diabetes mellitus     GERD (gastroesophageal reflux disease)     H. pylori infection     treated    Hyperlipidemia     intolerant to statins.    Hypertension 8/7/2013    IBS (irritable bowel syndrome)     Leg pain 3/19/2014    Long-term (current) use of anticoagulants 8/7/2013    Osteoarthritis     Osteoarthritis     Osteopenia     DEXA scan done on 06/20/12     Pancreatitis     resolved    S/P CABG (coronary artery bypass graft) 8/7/2013    S/P carotid endarterectomy 8/7/2013    S/P PTCA (percutaneous transluminal coronary angioplasty) 3/19/2014    Sjogren's syndrome     Stroke     Vitamin D deficiency disease      Past Surgical History:   Procedure Laterality Date    ABDOMINAL SURGERY      APPENDECTOMY      bilateral cataract surgery      CARPAL TUNNEL RELEASE      RT    CATARACT EXTRACTION      CEA      left    CHOLECYSTECTOMY      CORONARY ARTERY BYPASS GRAFT      2 stents    EYE SURGERY      HYSTERECTOMY      TONSILLECTOMY       Social History     Social History    Marital status:      Spouse name: N/A    Number of children: N/A    Years of education: N/A     Occupational History    Not on file.     Social History Main Topics    Smoking status: Former Smoker     Packs/day: 0.50     Years: 25.00     Quit date: 12/17/1988    Smokeless tobacco: Never Used    Alcohol use 0.0 oz/week      Comment: wine occasionally    Drug use: No    Sexual activity: Yes      Partners: Male     Other Topics Concern    Not on file     Social History Narrative    No narrative on file     Review of Systems   Constitutional: Positive for weight loss and fatigue.   HENT: Negative.    Respiratory: Positive for chest tightness, shortness of breath, wheezing, orthopnea, previous hospitalization due to pulmonary problems, asthma nighttime symptoms, dyspnea on extertion and Paroxysmal Nocturnal Dyspnea.    Cardiovascular: Positive for palpitations and leg swelling.   Genitourinary: Negative.    Endocrine: endocrine negative   Musculoskeletal: Positive for arthralgias.   Skin: Negative.    Gastrointestinal: Positive for abdominal distention.   Neurological: Positive for weakness.   Psychiatric/Behavioral: Positive for sleep disturbance.       Objective:      Physical Exam   Constitutional: She is oriented to person, place, and time. She appears well-developed and well-nourished.   HENT:   Head: Normocephalic and atraumatic.   Eyes: Conjunctivae are normal. Pupils are equal, round, and reactive to light.   Neck: Neck supple. No JVD present. No tracheal deviation present. No thyromegaly present.   Cardiovascular: Normal rate.  An irregularly irregular rhythm present. PMI is displaced.    Murmur heard.   Systolic murmur is present with a grade of 2/6   Pulmonary/Chest: Effort normal. No respiratory distress. She has decreased breath sounds. She has no wheezes. She has rales in the right lower field and the left lower field. She exhibits no tenderness.   Abdominal: Soft. Bowel sounds are normal. She exhibits distension.   Musculoskeletal: Normal range of motion. She exhibits edema.   Lymphadenopathy:     She has no cervical adenopathy.   Neurological: She is alert and oriented to person, place, and time.   Skin: Skin is warm and dry.   Nursing note and vitals reviewed.    Personal Diagnostic Review  Chest x-ray: cardiomegaly and bilateral pleural effusion  PET: negative  No flowsheet data found.      Overnight O2 sat : Abnormal    Time with SpO2<88: 0:59:08, 10.6%    ECHOCARDIOGRAM  CONCLUSIONS     1 - Concentric remodeling.     2 - No wall motion abnormalities.     3 - Normal left ventricular systolic function (EF 55-60%).     4 - Restrictive LV filling pattern, indicating markedly elevated LAP (grade 3 diastolic dysfunction).     5 - Right ventricular enlargement with low normal to mildly depressed systolic function.     6 - Pulmonary hypertension. The estimated PA systolic pressure is 50 mmHg.     7 - Mild aortic regurgitation.     8 - Mild mitral regurgitation.     9 - Moderate tricuspid regurgitation.     10 - Intermediate central venous pressure.             This document has been electronically    SIGNED BY: Kinjal Fletcher MD On: 08/16/2017 19:35      Specimen Collected: 08/16/17 15:15 Last Resulted: 08/16/17 19:39             Assessment:       No diagnosis found.    Outpatient Encounter Prescriptions as of 3/29/2018   Medication Sig Dispense Refill    albuterol (PROAIR HFA) 90 mcg/actuation inhaler Inhale 2 puffs into the lungs every 6 (six) hours as needed. 1 Inhaler 11    albuterol (PROVENTIL) 2.5 mg /3 mL (0.083 %) nebulizer solution Take 3 mLs (2.5 mg total) by nebulization every 6 (six) hours as needed for Wheezing or Shortness of Breath. Rescue 360 each 11    aspirin (ECOTRIN) 81 MG EC tablet Take 1 tablet (81 mg total) by mouth once daily. 30 tablet 6    atorvastatin (LIPITOR) 40 MG tablet TAKE ONE TABLET BY MOUTH NIGHTLY 90 tablet 6    azelastine (ASTELIN) 137 mcg (0.1 %) nasal spray 1 spray by Nasal route 2 (two) times daily.      benzonatate (TESSALON) 100 MG capsule Take 1 capsule (100 mg total) by mouth 3 (three) times daily as needed for Cough. 30 capsule 1    biotin 1 mg tablet Take 1,000 mcg by mouth 3 (three) times daily.      budesonide-formoterol 160-4.5 mcg (SYMBICORT) 160-4.5 mcg/actuation HFAA Inhale 2 puffs into the lungs every 12 (twelve) hours. Wash out mouth after using  1 Inhaler 11    carboxymethylcellulose (REFRESH PLUS) 0.5 % Dpet Place 1 drop into both eyes 3 (three) times daily as needed.      diclofenac sodium (VOLTAREN) 1 % Gel Apply 2 g topically once daily. 3 Tube 4    escitalopram oxalate (LEXAPRO) 10 MG tablet TAKE ONE-HALF TO ONE TABLET BY MOUTH EVERY DAY 90 tablet 3    fexofenadine-pseudoephedrine (ALLEGRA-D 24) 180-240 mg per 24 hr tablet Take 1 tablet by mouth once daily.      flecainide (TAMBOCOR) 100 MG Tab TAKE ONE TABLET BY MOUTH TWICE DAILY STARTING SUNDAY. (DISCONTINUE RYTHMOL) 60 tablet 6    fluticasone (FLONASE) 50 mcg/actuation nasal spray 2 sprays by Each Nare route once daily. 1 Bottle 11    furosemide (LASIX) 20 MG tablet Take 1 tablet (20 mg total) by mouth 2 (two) times daily. As of 8/4/17 180 tablet 3    guaifenesin (MUCINEX) 600 mg 12 hr tablet Take 2 tablets (1,200 mg total) by mouth 2 (two) times daily. As needed for flares 60 tablet 11    hydroxychloroquine (PLAQUENIL) 200 mg tablet Take 200 mg by mouth once daily.      hyoscyamine (ANASPAZ) 0.125 mg TbDL Take 0.125 mg by mouth every 6 (six) hours as needed for Cramping.      nebivolol (BYSTOLIC) 5 MG Tab TAKE ONE TABLET BY MOUTH TWICE DAILY 180 tablet 3    nitroGLYCERIN (NITROSTAT) 0.4 MG SL tablet Place 1 tablet (0.4 mg total) under the tongue every 5 (five) minutes as needed for Chest pain. 25 tablet 6    nitroGLYCERIN 0.2 mg/hr TD PT24 (NITRODUR) 0.2 mg/hr Place 1 patch onto the skin once daily. 30 patch 11    pantoprazole (PROTONIX) 40 MG tablet Take 1 tablet (40 mg total) by mouth 2 (two) times daily. As of 8/4/17 60 tablet 6    phenobarb-hyoscy-atropine-scop (BELLADONNA-PHENOBARBITAL) 16.2-0.1037 -0.0194 mg/5 mL Elix Take 5 mLs by mouth daily as needed (abdominal pain). 180 mL 1    potassium chloride (KLOR-CON) 10 MEQ TbSR Take 1 tablet (10 mEq total) by mouth once daily. 90 tablet 3    pramoxine 1 % Foam Place rectally 3 (three) times daily as needed. 15 g 1    PREMARIN  vaginal cream PLACE 1 GRAM VAGINALLY TWICE A WEEK 30 g 3    RESTASIS 0.05 % ophthalmic emulsion INSTILL ONE DROP INTO EACH EYE TWICE DAILY 60 each 12    rivaroxaban (XARELTO) 20 mg Tab Take 1 tablet (20 mg total) by mouth daily with dinner or evening meal. 90 tablet 3    tramadol (ULTRAM) 50 mg tablet Take 1 tablet (50 mg total) by mouth 3 (three) times daily as needed. 90 tablet 3    triamcinolone acetonide 0.1% (KENALOG) 0.1 % ointment AAA bid prn 60 g 1    vitamin D 1000 units Tab Take 2,000 Units by mouth once daily.       Facility-Administered Encounter Medications as of 3/29/2018   Medication Dose Route Frequency Provider Last Rate Last Dose    denosumab (PROLIA) injection 60 mg  60 mg Subcutaneous Q6 Months Jaci Day NP   60 mg at 07/12/17 1026     No orders of the defined types were placed in this encounter.    Plan:       Requested Prescriptions      No prescriptions requested or ordered in this encounter     There are no diagnoses linked to this encounter.       No Follow-up on file.    MEDICAL DECISION MAKING: Moderate to high complexity.  Overall, the multiple problems listed are of moderate to high severity that may impact quality of life and activities of daily living. Side effects of medications, treatment plan as well as options and alternatives reviewed and discussed with patient. There was counseling of patient concerning these issues.    Total time spent in face to face counseling and coordination of care - 50  minutes over 50% of time was used in discussion of prognosis, risks, benefits of treatment, instructions and compliance with regimen . Discussion with other physicians or health care providers (DME, NP, pharmacy, respiratory therapy) occurred.

## 2025-06-21 ENCOUNTER — RESULTS FOLLOW-UP (OUTPATIENT)
Dept: FAMILY MEDICINE CLINIC | Facility: CLINIC | Age: 50
End: 2025-06-21

## 2025-06-21 ENCOUNTER — APPOINTMENT (OUTPATIENT)
Dept: LAB | Facility: CLINIC | Age: 50
End: 2025-06-21
Payer: COMMERCIAL

## 2025-06-21 DIAGNOSIS — Z00.6 ENCOUNTER FOR EXAMINATION FOR NORMAL COMPARISON OR CONTROL IN CLINICAL RESEARCH PROGRAM: ICD-10-CM

## 2025-06-21 DIAGNOSIS — E66.09 CLASS 1 OBESITY DUE TO EXCESS CALORIES WITHOUT SERIOUS COMORBIDITY WITH BODY MASS INDEX (BMI) OF 31.0 TO 31.9 IN ADULT: ICD-10-CM

## 2025-06-21 DIAGNOSIS — E66.811 CLASS 1 OBESITY DUE TO EXCESS CALORIES WITHOUT SERIOUS COMORBIDITY WITH BODY MASS INDEX (BMI) OF 31.0 TO 31.9 IN ADULT: ICD-10-CM

## 2025-06-21 DIAGNOSIS — R73.03 PREDIABETES: ICD-10-CM

## 2025-06-21 LAB
ALBUMIN SERPL BCG-MCNC: 4.6 G/DL (ref 3.5–5)
ALP SERPL-CCNC: 50 U/L (ref 34–104)
ALT SERPL W P-5'-P-CCNC: 29 U/L (ref 7–52)
ANION GAP SERPL CALCULATED.3IONS-SCNC: 9 MMOL/L (ref 4–13)
AST SERPL W P-5'-P-CCNC: 17 U/L (ref 13–39)
BASOPHILS # BLD AUTO: 0.07 THOUSANDS/ÂΜL (ref 0–0.1)
BASOPHILS NFR BLD AUTO: 1 % (ref 0–1)
BILIRUB SERPL-MCNC: 1 MG/DL (ref 0.2–1)
BUN SERPL-MCNC: 15 MG/DL (ref 5–25)
CALCIUM SERPL-MCNC: 9.3 MG/DL (ref 8.4–10.2)
CHLORIDE SERPL-SCNC: 103 MMOL/L (ref 96–108)
CHOLEST SERPL-MCNC: 141 MG/DL (ref ?–200)
CO2 SERPL-SCNC: 28 MMOL/L (ref 21–32)
CREAT SERPL-MCNC: 0.64 MG/DL (ref 0.6–1.3)
EOSINOPHIL # BLD AUTO: 0.17 THOUSAND/ÂΜL (ref 0–0.61)
EOSINOPHIL NFR BLD AUTO: 2 % (ref 0–6)
ERYTHROCYTE [DISTWIDTH] IN BLOOD BY AUTOMATED COUNT: 13.4 % (ref 11.6–15.1)
EST. AVERAGE GLUCOSE BLD GHB EST-MCNC: 123 MG/DL
GFR SERPL CREATININE-BSD FRML MDRD: 114 ML/MIN/1.73SQ M
GLUCOSE P FAST SERPL-MCNC: 101 MG/DL (ref 65–99)
HBA1C MFR BLD: 5.9 %
HCT VFR BLD AUTO: 50.8 % (ref 36.5–49.3)
HDLC SERPL-MCNC: 40 MG/DL
HGB BLD-MCNC: 16.5 G/DL (ref 12–17)
IMM GRANULOCYTES # BLD AUTO: 0.03 THOUSAND/UL (ref 0–0.2)
IMM GRANULOCYTES NFR BLD AUTO: 0 % (ref 0–2)
LDLC SERPL CALC-MCNC: 87 MG/DL (ref 0–100)
LYMPHOCYTES # BLD AUTO: 2.59 THOUSANDS/ÂΜL (ref 0.6–4.47)
LYMPHOCYTES NFR BLD AUTO: 26 % (ref 14–44)
MCH RBC QN AUTO: 28.8 PG (ref 26.8–34.3)
MCHC RBC AUTO-ENTMCNC: 32.5 G/DL (ref 31.4–37.4)
MCV RBC AUTO: 89 FL (ref 82–98)
MONOCYTES # BLD AUTO: 0.83 THOUSAND/ÂΜL (ref 0.17–1.22)
MONOCYTES NFR BLD AUTO: 8 % (ref 4–12)
NEUTROPHILS # BLD AUTO: 6.17 THOUSANDS/ÂΜL (ref 1.85–7.62)
NEUTS SEG NFR BLD AUTO: 63 % (ref 43–75)
NRBC BLD AUTO-RTO: 0 /100 WBCS
PLATELET # BLD AUTO: 279 THOUSANDS/UL (ref 149–390)
PMV BLD AUTO: 11.4 FL (ref 8.9–12.7)
POTASSIUM SERPL-SCNC: 4.1 MMOL/L (ref 3.5–5.3)
PROT SERPL-MCNC: 7.3 G/DL (ref 6.4–8.4)
RBC # BLD AUTO: 5.72 MILLION/UL (ref 3.88–5.62)
SODIUM SERPL-SCNC: 140 MMOL/L (ref 135–147)
TRIGL SERPL-MCNC: 72 MG/DL (ref ?–150)
WBC # BLD AUTO: 9.86 THOUSAND/UL (ref 4.31–10.16)

## 2025-06-21 PROCEDURE — 83036 HEMOGLOBIN GLYCOSYLATED A1C: CPT

## 2025-06-21 PROCEDURE — 85025 COMPLETE CBC W/AUTO DIFF WBC: CPT

## 2025-06-21 PROCEDURE — 80053 COMPREHEN METABOLIC PANEL: CPT

## 2025-06-21 PROCEDURE — 36415 COLL VENOUS BLD VENIPUNCTURE: CPT

## 2025-06-21 PROCEDURE — 80061 LIPID PANEL: CPT

## 2025-06-30 ENCOUNTER — OFFICE VISIT (OUTPATIENT)
Dept: FAMILY MEDICINE CLINIC | Facility: CLINIC | Age: 50
End: 2025-06-30
Payer: COMMERCIAL

## 2025-06-30 VITALS
SYSTOLIC BLOOD PRESSURE: 135 MMHG | WEIGHT: 214.6 LBS | BODY MASS INDEX: 30.72 KG/M2 | DIASTOLIC BLOOD PRESSURE: 85 MMHG | HEIGHT: 70 IN | RESPIRATION RATE: 18 BRPM | HEART RATE: 66 BPM | OXYGEN SATURATION: 99 % | TEMPERATURE: 96.5 F

## 2025-06-30 DIAGNOSIS — R73.03 PREDIABETES: ICD-10-CM

## 2025-06-30 DIAGNOSIS — Z00.00 ENCOUNTER FOR WELL ADULT EXAM WITHOUT ABNORMAL FINDINGS: Primary | ICD-10-CM

## 2025-06-30 DIAGNOSIS — I10 BENIGN HYPERTENSION: ICD-10-CM

## 2025-06-30 DIAGNOSIS — Z12.5 SCREENING PSA (PROSTATE SPECIFIC ANTIGEN): ICD-10-CM

## 2025-06-30 PROCEDURE — 99396 PREV VISIT EST AGE 40-64: CPT | Performed by: NURSE PRACTITIONER

## 2025-06-30 NOTE — PROGRESS NOTES
Adult Annual Physical  Name: Paul Melvin      : 1975      MRN: [id number]  Encounter Provider: Brunilda Alarcon DNP  Encounter Date: 2025   Encounter department: [facility]    :  Assessment & Plan  Encounter for well adult exam without abnormal findings    Orders:    CBC and differential; Future    Comprehensive metabolic panel; Future    Lipid Panel with Direct LDL reflex; Future    Benign hypertension  Average readings on home bp cuff, taking bp in evening hours  Checking once weekly        Prediabetes    Orders:    Hemoglobin A1C; Future    Screening PSA (prostate specific antigen)    Orders:    PSA, total and free; Future        Preventive Screenings:  - Diabetes Screening: screening up-to-date  - Colon cancer screening: screening up-to-date   - Lung cancer screening: screening not indicated   - Prostate cancer screening: screening up-to-date     Immunizations:  - Immunizations due: Prevnar 20 and Zoster (Shingrix)      Depression Screening and Follow-up Plan: Patient was screened for depression during today's encounter. They screened negative with a PHQ-2 score of 0.          History of Present Illness     Adult Annual Physical:  Patient presents for annual physical.     Diet and Physical Activity:  - Diet/Nutrition: limited junk food, intermittent fasting and adequate fiber intake.  - Exercise: vigorous cardiovascular exercise, 3-4 times a week on average and 30-60 minutes on average.    Depression Screening:  - PHQ-2 Score: 0    General Health:  - Sleep: sleeps well and > 8 hours of sleep on average.  - Hearing: normal hearing bilateral ears.  - Vision: most recent eye exam < 1 year ago and wears glasses.  - Dental: regular dental visits, brushes teeth twice daily and floss regularly.    /GYN Health:  - Follows with GYN: no.   - History of STDs: no     Health:  - History of STDs: no.   - Urinary symptoms: erectile dysfunction.     Advanced Care Planning:  - Has  "an advanced directive?: no    - Has a durable medical POA?: no    - ACP document given to patient?: no      Review of Systems   Constitutional:  Negative for activity change, chills, fatigue and fever.   HENT:  Negative for congestion, ear discharge, ear pain, sinus pressure, sinus pain, sore throat, tinnitus and trouble swallowing.    Eyes:  Negative for photophobia, pain, discharge, itching and visual disturbance.   Respiratory:  Negative for cough, chest tightness, shortness of breath and wheezing.    Cardiovascular:  Negative for chest pain and leg swelling.   Gastrointestinal:  Negative for abdominal distention, abdominal pain, constipation, diarrhea, nausea and vomiting.   Endocrine: Negative for polydipsia, polyphagia and polyuria.   Genitourinary:  Negative for dysuria and frequency.   Musculoskeletal:  Negative for arthralgias, myalgias, neck pain and neck stiffness.   Skin:  Negative for color change.   Neurological:  Negative for dizziness, syncope, weakness, numbness and headaches.   Hematological:  Does not bruise/bleed easily.   Psychiatric/Behavioral:  Negative for behavioral problems, confusion, self-injury, sleep disturbance and suicidal ideas. The patient is not nervous/anxious.          Objective   /85 (BP Location: Left arm, Patient Position: Sitting, Cuff Size: Standard)   Pulse 66   Temp (!) 96.5 °F (35.8 °C) (Tympanic)   Resp 18   Ht 5' 10\" (1.778 m)   Wt 97.3 kg (214 lb 9.6 oz)   SpO2 99%   BMI 30.79 kg/m²     Physical Exam  Vitals and nursing note reviewed.   Constitutional:       Appearance: Normal appearance. He is well-developed.   HENT:      Head: Normocephalic and atraumatic.      Right Ear: Tympanic membrane, ear canal and external ear normal.      Left Ear: Tympanic membrane, ear canal and external ear normal.      Nose: Nose normal. No congestion or rhinorrhea.      Mouth/Throat:      Mouth: Mucous membranes are moist.      Pharynx: No oropharyngeal exudate or posterior " oropharyngeal erythema.     Eyes:      General:         Right eye: No discharge.         Left eye: No discharge.      Conjunctiva/sclera: Conjunctivae normal.      Pupils: Pupils are equal, round, and reactive to light.     Neck:      Thyroid: No thyromegaly.     Cardiovascular:      Rate and Rhythm: Normal rate and regular rhythm.      Pulses: Normal pulses.      Heart sounds: Normal heart sounds.   Pulmonary:      Effort: Pulmonary effort is normal.      Breath sounds: Normal breath sounds. No wheezing or rhonchi.   Abdominal:      General: Bowel sounds are normal. There is no distension.      Palpations: Abdomen is soft.      Tenderness: There is no abdominal tenderness.     Musculoskeletal:         General: No swelling, tenderness or deformity. Normal range of motion.      Cervical back: Normal range of motion and neck supple.      Right lower leg: No edema.      Left lower leg: No edema.     Skin:     General: Skin is warm and dry.      Findings: No erythema or rash.     Neurological:      General: No focal deficit present.      Mental Status: He is alert and oriented to person, place, and time.     Psychiatric:         Mood and Affect: Mood normal.         Behavior: Behavior normal.         Thought Content: Thought content normal.         Judgment: Judgment normal.

## 2025-07-01 LAB
APOB+LDLR+PCSK9 GENE MUT ANL BLD/T: NOT DETECTED
BRCA1+BRCA2 DEL+DUP + FULL MUT ANL BLD/T: NOT DETECTED
MLH1+MSH2+MSH6+PMS2 GN DEL+DUP+FUL M: NOT DETECTED

## 2025-08-04 ENCOUNTER — APPOINTMENT (OUTPATIENT)
Dept: RADIOLOGY | Facility: CLINIC | Age: 50
End: 2025-08-04
Attending: PHYSICIAN ASSISTANT
Payer: COMMERCIAL

## 2025-08-04 ENCOUNTER — OFFICE VISIT (OUTPATIENT)
Dept: URGENT CARE | Facility: CLINIC | Age: 50
End: 2025-08-04
Payer: COMMERCIAL

## 2025-08-04 VITALS
SYSTOLIC BLOOD PRESSURE: 128 MMHG | DIASTOLIC BLOOD PRESSURE: 82 MMHG | TEMPERATURE: 97.9 F | RESPIRATION RATE: 18 BRPM | HEART RATE: 73 BPM | OXYGEN SATURATION: 97 %

## 2025-08-04 DIAGNOSIS — R06.02 SOB (SHORTNESS OF BREATH): ICD-10-CM

## 2025-08-04 DIAGNOSIS — J20.9 ACUTE BRONCHITIS, UNSPECIFIED ORGANISM: Primary | ICD-10-CM

## 2025-08-04 PROCEDURE — 99214 OFFICE O/P EST MOD 30 MIN: CPT | Performed by: PHYSICIAN ASSISTANT

## 2025-08-04 PROCEDURE — 71046 X-RAY EXAM CHEST 2 VIEWS: CPT

## 2025-08-04 RX ORDER — PREDNISONE 20 MG/1
60 TABLET ORAL DAILY
Qty: 15 TABLET | Refills: 0 | Status: SHIPPED | OUTPATIENT
Start: 2025-08-04 | End: 2025-08-09

## 2025-08-04 RX ORDER — ALBUTEROL SULFATE 90 UG/1
2 INHALANT RESPIRATORY (INHALATION) EVERY 6 HOURS PRN
Qty: 8.5 G | Refills: 0 | Status: SHIPPED | OUTPATIENT
Start: 2025-08-04

## (undated) DEVICE — GAUZE SPONGES,16 PLY: Brand: CURITY

## (undated) DEVICE — ACE WRAP 4 IN UNSTERILE

## (undated) DEVICE — LIGHT HANDLE COVER SLEEVE DISP BLUE STELLAR

## (undated) DEVICE — SCD SEQUENTIAL COMPRESSION COMFORT SLEEVE MEDIUM KNEE LENGTH: Brand: KENDALL SCD

## (undated) DEVICE — NEEDLE 25G X 1 1/2

## (undated) DEVICE — TUBING SUCTION 5MM X 12 FT

## (undated) DEVICE — UNDYED BRAIDED (POLYGLACTIN 910), SYNTHETIC ABSORBABLE SUTURE: Brand: COATED VICRYL

## (undated) DEVICE — PENCIL ELECTROSURG E-Z CLEAN -0035H

## (undated) DEVICE — SHEATH URETERAL ACCESS 12/14FR 35CM PROXIS

## (undated) DEVICE — SUT FIBERWIRE #2 1/2 CIRCLE T-5 38IN AR-7200

## (undated) DEVICE — CATH URET .038 10FR 50CM DUAL LUMEN

## (undated) DEVICE — SUT VICRYL 0 CT-1 27 IN J260H

## (undated) DEVICE — SPECIMEN CONTAINER STERILE PEEL PACK

## (undated) DEVICE — PADDING CAST 4 IN  COTTON STRL

## (undated) DEVICE — GUIDEWIRE STRGHT TIP 0.035 IN  SOLO PLUS

## (undated) DEVICE — INTENT TO BE USED WITH SUTURE MATERIAL FOR TISSUE CLOSURE: Brand: RICHARD-ALLAN® NEEDLE 1/2 CIRCLE TAPER

## (undated) DEVICE — GLOVE SRG BIOGEL 8

## (undated) DEVICE — MEDI-VAC YANK SUCT HNDL W/TPRD BULBOUS TIP: Brand: CARDINAL HEALTH

## (undated) DEVICE — SUT ETHILON 4-0 PS-2 18 IN 1667H

## (undated) DEVICE — PACK TUR

## (undated) DEVICE — GLOVE SRG BIOGEL ORTHOPEDIC 7.5

## (undated) DEVICE — INTENT TO BE USED WITH SUTURE MATERIAL FOR TISSUE CLOSURE: Brand: RICHARD-ALLAN® NEEDLE STRAIGHT CUTTING

## (undated) DEVICE — INTENDED FOR TISSUE SEPARATION, AND OTHER PROCEDURES THAT REQUIRE A SHARP SURGICAL BLADE TO PUNCTURE OR CUT.: Brand: BARD-PARKER ® CARBON RIB-BACK BLADES

## (undated) DEVICE — SUT ETHIBOND 0 SH 30 IN X834H

## (undated) DEVICE — CATH URETERAL 5FR X 70 CM FLEX TIP POLYUR BARD

## (undated) DEVICE — DRAPE KIT C-ARM W/PLATE PRTC FOOT SWITCH COVER

## (undated) DEVICE — STRETCH BANDAGE: Brand: CURITY

## (undated) DEVICE — BASKET STONE RTRVL ZERO TIP 2.4FR

## (undated) DEVICE — LASER FIBER HOLMIUM 272MICRON

## (undated) DEVICE — STERILE BETHLEHEM PLASTIC HAND: Brand: CARDINAL HEALTH

## (undated) DEVICE — CURITY NON-ADHERENT STRIPS: Brand: CURITY

## (undated) DEVICE — UROCATCH BAG